# Patient Record
Sex: FEMALE | Race: WHITE | Employment: OTHER | URBAN - METROPOLITAN AREA
[De-identification: names, ages, dates, MRNs, and addresses within clinical notes are randomized per-mention and may not be internally consistent; named-entity substitution may affect disease eponyms.]

---

## 2017-03-27 DIAGNOSIS — I10 ESSENTIAL HYPERTENSION, BENIGN: ICD-10-CM

## 2017-03-27 DIAGNOSIS — E78.5 HYPERLIPIDEMIA LDL GOAL <130: ICD-10-CM

## 2017-03-27 NOTE — TELEPHONE ENCOUNTER
Lisinopril      Last Written Prescription Date: 10/04/16  Last Fill Quantity: 90, # refills: 1  Last Office Visit with [facility] or  Health prescribing provider: 10/24/16  Next 5 appointments (look out 90 days)     Apr 26, 2017 10:20 AM CDT   SHORT with Adri Malcolm MD   [facility]    [address]  [facility] [address]   [phone]                   Potassium   Date Value Ref Range Status   10/24/2016 4.4 3.4 - 5.3 mmol/L Final     Creatinine   Date Value Ref Range Status   10/24/2016 0.99 0.52 - 1.04 mg/dL Final     BP Readings from Last 3 Encounters:   10/24/16 130/78   03/30/16 134/80   10/07/15 138/84       Simvastatin     Last Written Prescription Date: 10/04/16  Last Fill Quantity: 90, # refills: 1  Last Office Visit with [facility], [facility] or  Health prescribing provider: 10/24/16  Next 5 appointments (look out 90 days)     Apr 26, 2017 10:20 AM CDT   SHORT with Adri Malcolm MD   [facility]    [address]  [facility] [address]   [phone]                   Lab Results   Component Value Date    CHOL 184 03/30/2016     Lab Results   Component Value Date    HDL 58 03/30/2016     Lab Results   Component Value Date    LDL 79 03/30/2016     Lab Results   Component Value Date    TRIG 233 03/30/2016     Lab Results   Component Value Date    CHOLHDLRATIO 6.1 10/07/2015       Labs showing if normal/abnormal  Lab Results   Component Value Date    CHOL 184 03/30/2016    TRIG 233 (H) 03/30/2016    HDL 58 03/30/2016    LDL 79 03/30/2016    VLDL 47 (H) 10/07/2015    CHOLHDLRATIO 6.1 (H) 10/07/2015

## 2017-03-28 RX ORDER — SIMVASTATIN 20 MG
20 TABLET ORAL AT BEDTIME
Qty: 90 TABLET | Refills: 0 | Status: SHIPPED | OUTPATIENT
Start: 2017-03-28 | End: 2017-04-26

## 2017-03-28 RX ORDER — LISINOPRIL 10 MG/1
10 TABLET ORAL DAILY
Qty: 90 TABLET | Refills: 0 | Status: SHIPPED | OUTPATIENT
Start: 2017-03-28 | End: 2017-04-26

## 2017-04-26 ENCOUNTER — OFFICE VISIT (OUTPATIENT)
Dept: INTERNAL MEDICINE | Facility: CLINIC | Age: 77
End: 2017-04-26
Payer: MEDICARE

## 2017-04-26 VITALS
HEIGHT: 66 IN | SYSTOLIC BLOOD PRESSURE: 128 MMHG | WEIGHT: 182.1 LBS | TEMPERATURE: 97.7 F | DIASTOLIC BLOOD PRESSURE: 80 MMHG | OXYGEN SATURATION: 96 % | HEART RATE: 78 BPM | BODY MASS INDEX: 29.27 KG/M2

## 2017-04-26 DIAGNOSIS — R60.9 EDEMA, UNSPECIFIED TYPE: ICD-10-CM

## 2017-04-26 DIAGNOSIS — I10 ESSENTIAL HYPERTENSION, BENIGN: Primary | ICD-10-CM

## 2017-04-26 DIAGNOSIS — E78.5 HYPERLIPIDEMIA LDL GOAL <130: ICD-10-CM

## 2017-04-26 PROCEDURE — 36415 COLL VENOUS BLD VENIPUNCTURE: CPT | Performed by: INTERNAL MEDICINE

## 2017-04-26 PROCEDURE — 83735 ASSAY OF MAGNESIUM: CPT | Performed by: INTERNAL MEDICINE

## 2017-04-26 PROCEDURE — 80061 LIPID PANEL: CPT | Performed by: INTERNAL MEDICINE

## 2017-04-26 PROCEDURE — 80053 COMPREHEN METABOLIC PANEL: CPT | Performed by: INTERNAL MEDICINE

## 2017-04-26 PROCEDURE — 99214 OFFICE O/P EST MOD 30 MIN: CPT | Performed by: INTERNAL MEDICINE

## 2017-04-26 RX ORDER — FUROSEMIDE 20 MG
20 TABLET ORAL 2 TIMES DAILY
Qty: 180 TABLET | Refills: 1 | Status: SHIPPED | OUTPATIENT
Start: 2017-04-26 | End: 2017-10-25

## 2017-04-26 RX ORDER — SIMVASTATIN 20 MG
20 TABLET ORAL AT BEDTIME
Qty: 90 TABLET | Refills: 1 | Status: SHIPPED | OUTPATIENT
Start: 2017-04-26 | End: 2017-10-25

## 2017-04-26 RX ORDER — LISINOPRIL 10 MG/1
10 TABLET ORAL DAILY
Qty: 90 TABLET | Refills: 1 | Status: SHIPPED | OUTPATIENT
Start: 2017-04-26 | End: 2017-10-25

## 2017-04-26 NOTE — PROGRESS NOTES
SUBJECTIVE:                                                    Paris Hull is a 77 year old female who presents to clinic today for the following health issues:      Hyperlipidemia Follow-Up      Rate your low fat/cholesterol diet?: good    Taking statin?  Yes, no muscle aches from statin    Other lipid medications/supplements?:  none     Hypertension Follow-up      Outpatient blood pressures are not being checked.    Low Salt Diet: low salt       Edema Follow up; on lasix 20 mg -2 tabs daily , with good symptom relief. C/o occ leg cramps.          Patient Active Problem List   Diagnosis     Disorder of bone and cartilage     Osteoporosis     HYPERLIPIDEMIA LDL GOAL <130     Central retinal vein occlusion     Essential hypertension, benign     Edema, unspecified type     Past Surgical History:   Procedure Laterality Date     COLONOSCOPY       COLONOSCOPY N/A 2015    Procedure: COLONOSCOPY;  Surgeon: Santosh Liao MD;  Location: [facility]     HC REMOVAL OF TONSILS,<13 Y/O         Social History   Substance Use Topics     Smoking status: Former Smoker     Packs/day: 0.50     Types: Cigarettes     Smokeless tobacco: Former User     Quit date: 2012      Comment: Has smoked for over 30 yrs.     Alcohol use No     Family History   Problem Relation Age of Onset     Cardiovascular Mother      a-fib/living age 87     GASTROINTESTINAL DISEASE Mother      diverticulosis     HEART DISEASE Father       age 80-CVA     Cardiovascular Father      Neurologic Disorder Father      parkinsons     Family History Negative Sister      age 59     Family History Negative Brother      age 67     Colon Cancer No family hx of          Current Outpatient Prescriptions   Medication Sig Dispense Refill     furosemide (LASIX) 20 MG tablet Take 1 tablet (20 mg) by mouth 2 times daily 180 tablet 1     simvastatin (ZOCOR) 20 MG tablet Take 1 tablet (20 mg) by mouth At Bedtime 90 tablet 0     lisinopril (PRINIVIL/ZESTRIL) 10  "MG tablet Take 1 tablet (10 mg) by mouth daily 90 tablet 0     aspirin 81 MG tablet Take 81 mg by mouth daily       ibuprofen (ADVIL,MOTRIN) 200 MG tablet Take 2 tablets by mouth every 8 hours as needed for pain. 1 tablet 0     fish oil-omega-3 fatty acids (FISH OIL) 1000 MG capsule Take 2 g by mouth daily.       Cholecalciferol (VITAMIN D) 1000 UNIT capsule Take 1 capsule by mouth daily.       ALEVE OR aleve arthritis prn       CALCIUM 600 + D 600-200 MG-UNIT OR TABS 1 tid  0     METAMUCIL 0.52 GM OR CAPS 2-3  tid  0     MULTIPLE VITAMINS OR TABS        [DISCONTINUED] furosemide (LASIX) 20 MG tablet Take 1 tablet (20 mg) by mouth 2 times daily 180 tablet 1       Reviewed and updated as needed this visit by clinical staff  Tobacco  Allergies  Meds  Med Hx  Surg Hx  Fam Hx  Soc Hx      Reviewed and updated as needed this visit by Provider         ROS:  C: NEGATIVE for fever, chills, change in weight  E/M: NEGATIVE for ear, mouth and throat problems  R: NEGATIVE for significant cough or SOB  CV: NEGATIVE for chest pain, palpitations or peripheral edema  MUSCULOSKELETAL: NEGATIVE for significant arthralgias or myalgia  NEURO: NEGATIVE for weakness, dizziness or paresthesias    OBJECTIVE:                                                    /80 (BP Location: Left arm, Patient Position: Chair, Cuff Size: Adult Regular)  Pulse 78  Temp 97.7  F (36.5  C) (Oral)  Ht 5' 6\" (1.676 m)  Wt 182 lb 1.6 oz (82.6 kg)  SpO2 96%  Breastfeeding? No  BMI 29.39 kg/m2  Body mass index is 29.39 kg/(m^2).   GENERAL: healthy, alert, well nourished, well hydrated, no distress  EYES: Eyes grossly normal to inspection, extraocular movements - intact, and PERRL  HENT: ear canals- normal; TMs- normal; Nose- normal; Mouth- no ulcers, no lesions  NECK: no tenderness, no adenopathy, no asymmetry, no masses, no stiffness; thyroid- normal to palpation  RESP: lungs clear to auscultation - no rales, no rhonchi, no wheezes  CV: regular " rates and rhythm,   MS: extremities- no gross deformities noted, no edema, no calf tenderness  NEURO: strength and tone- normal, sensory exam- grossly normal, mentation- intact, speech- normal, reflexes- symmetric         ASSESSMENT/PLAN:                                                        (I10) Essential hypertension, benign  (primary encounter diagnosis)  Comment: BP well controlled   Plan: Comprehensive metabolic panel, refilled lisinopril (PRINIVIL/ZESTRIL) 10 MG tablet daily as directed.explained clearly about the medication,insructions and side effects.   Advised to follow low salt diet and exercise and f/u in 6 mths.             (R60.9) Edema, unspecified type  Comment: stable   Plan: refilled furosemide (LASIX) 20 MG tablet.explained clearly about the medication,insructions and side effects. Has muscle cramps-Check potassium,calcium and check  Magnesium also             (E78.5) Hyperlipidemia LDL goal <130  Plan: Lipid panel reflex to direct LDL, refilled simvastatin (ZOCOR) 20 MG tablet as directed.explained clearly about the medication,insructions and side effects.            Adri Malcolm MD  [facility]

## 2017-04-26 NOTE — MR AVS SNAPSHOT
"              After Visit Summary   4/26/2017    Paris Hull    MRN: [id number]           Patient Information     Date Of Birth          1940        Visit Information        Provider Department      4/26/2017 10:20 AM Adri Malcolm MD [facility]        Today's Diagnoses     Essential hypertension, benign    -  1    Edema, unspecified type        Hyperlipidemia LDL goal <130           Follow-ups after your visit        Your next 10 appointments already scheduled     Oct 25, 2017 10:20 AM CDT   SHORT with Adri Malcolm MD   [facility] ([facility])    [address]  [facility] [id number] [phone]              Who to contact     If you have questions or need follow up information about today's clinic visit or your schedule please contact [facility] directly at [phone].  Normal or non-critical lab and imaging results will be communicated to you by MyChart, letter or phone within 4 business days after the clinic has received the results. If you do not hear from us within 7 days, please contact the clinic through MyChart or phone. If you have a critical or abnormal lab result, we will notify you by phone as soon as possible.  Submit refill requests through [employer] or call your pharmacy and they will forward the refill request to us. Please allow 3 business days for your refill to be completed.          Additional Information About Your Visit        MyChart Information     [employer] lets you send messages to your doctor, view your test results, renew your prescriptions, schedule appointments and more. To sign up, go to [email or web address] . Click on \"Log in\" on the left side of the screen, which will take you to the Welcome page. Then click on \"Sign up Now\" on the right side of the page.     You will be asked to enter the access code listed below, as well as some personal information. Please follow " "the directions to create your username and password.     Your access code is: [email or web address]  Expires: 2017 11:07 AM     Your access code will  in 90 days. If you need help or a new code, please call your [facility] clinic or [phone].        Care EveryWhere ID     This is your Care EveryWhere ID. This could be used by other organizations to access your [facility] medical records  [id number]        Your Vitals Were     Pulse Temperature Height Pulse Oximetry Breastfeeding? BMI (Body Mass Index)    78 97.7  F (36.5  C) (Oral) 5' 6\" (1.676 m) 96% No 29.39 kg/m2       Blood Pressure from Last 3 Encounters:   17 128/80   10/24/16 130/78   16 134/80    Weight from Last 3 Encounters:   17 182 lb 1.6 oz (82.6 kg)   10/24/16 178 lb (80.7 kg)   16 188 lb (85.3 kg)              We Performed the Following     Comprehensive metabolic panel     Lipid panel reflex to direct LDL     Magnesium          Where to get your medicines      These medications were sent to [facility] [place], [place] - [address]  [address] [facility] [address]     Phone:  [phone]     furosemide 20 MG tablet    lisinopril 10 MG tablet    simvastatin 20 MG tablet          Primary Care Provider Office Phone # Fax #    Krishnakumari MÓNICA Malcolm [phone]       [facility] [address] [facility] [address]        Thank you!     Thank you for choosing [facility]  for your care. Our goal is always to provide you with excellent care. Hearing back from our patients is one way we can continue to improve our services. Please take a few minutes to complete the written survey that you may receive in the mail after your visit with us. Thank you!             Your Updated Medication List - Protect others around you: Learn how to safely use, store and throw away your medicines at www.disposemymeds.org.          This list is accurate " as of: 4/26/17 11:07 AM.  Always use your most recent med list.                   Brand Name Dispense Instructions for use    ALEVE PO      aleve arthritis prn       aspirin 81 MG tablet      Take 81 mg by mouth daily       CALCIUM 600 + D 600-200 MG-UNIT Tabs      1 tid       fish oil-omega-3 fatty acids 1000 MG capsule      Take 2 g by mouth daily.       furosemide 20 MG tablet    LASIX    180 tablet    Take 1 tablet (20 mg) by mouth 2 times daily       ibuprofen 200 MG tablet    ADVIL/MOTRIN    1 tablet    Take 2 tablets by mouth every 8 hours as needed for pain.       lisinopril 10 MG tablet    PRINIVIL/ZESTRIL    90 tablet    Take 1 tablet (10 mg) by mouth daily       METAMUCIL 0.52 G capsule   Generic drug:  psyllium      2-3  tid       Multiple vitamin  s Tabs          simvastatin 20 MG tablet    ZOCOR    90 tablet    Take 1 tablet (20 mg) by mouth At Bedtime       vitamin D 1000 UNITS capsule      Take 1 capsule by mouth daily.

## 2017-04-26 NOTE — NURSING NOTE
"Chief Complaint   Patient presents with     RECHECK     Pt is F/U on Bp       Initial /80 (BP Location: Left arm, Patient Position: Chair, Cuff Size: Adult Regular)  Pulse 78  Temp 97.7  F (36.5  C) (Oral)  Ht 5' 6\" (1.676 m)  Wt 182 lb 1.6 oz (82.6 kg)  SpO2 96%  Breastfeeding? No  BMI 29.39 kg/m2 Estimated body mass index is 29.39 kg/(m^2) as calculated from the following:    Height as of this encounter: 5' 6\" (1.676 m).    Weight as of this encounter: 182 lb 1.6 oz (82.6 kg).  Medication Reconciliation: complete   Shannan Whitlock MA    "

## 2017-04-27 LAB
ALBUMIN SERPL-MCNC: 3.9 G/DL (ref 3.4–5)
ALP SERPL-CCNC: 64 U/L (ref 40–150)
ALT SERPL W P-5'-P-CCNC: 22 U/L (ref 0–50)
ANION GAP SERPL CALCULATED.3IONS-SCNC: 10 MMOL/L (ref 3–14)
AST SERPL W P-5'-P-CCNC: 13 U/L (ref 0–45)
BILIRUB SERPL-MCNC: 0.8 MG/DL (ref 0.2–1.3)
BUN SERPL-MCNC: 16 MG/DL (ref 7–30)
CALCIUM SERPL-MCNC: 9.5 MG/DL (ref 8.5–10.1)
CHLORIDE SERPL-SCNC: 106 MMOL/L (ref 94–109)
CHOLEST SERPL-MCNC: 193 MG/DL
CO2 SERPL-SCNC: 26 MMOL/L (ref 20–32)
CREAT SERPL-MCNC: 0.88 MG/DL (ref 0.52–1.04)
GFR SERPL CREATININE-BSD FRML MDRD: 63 ML/MIN/1.7M2
GLUCOSE SERPL-MCNC: 93 MG/DL (ref 70–99)
HDLC SERPL-MCNC: 55 MG/DL
LDLC SERPL CALC-MCNC: 100 MG/DL
MAGNESIUM SERPL-MCNC: 2.5 MG/DL (ref 1.6–2.3)
NONHDLC SERPL-MCNC: 138 MG/DL
POTASSIUM SERPL-SCNC: 4.1 MMOL/L (ref 3.4–5.3)
PROT SERPL-MCNC: 7.4 G/DL (ref 6.8–8.8)
SODIUM SERPL-SCNC: 142 MMOL/L (ref 133–144)
TRIGL SERPL-MCNC: 189 MG/DL

## 2017-05-24 DIAGNOSIS — E83.41 HYPERMAGNESEMIA: ICD-10-CM

## 2017-05-24 PROCEDURE — 83735 ASSAY OF MAGNESIUM: CPT | Performed by: INTERNAL MEDICINE

## 2017-05-24 PROCEDURE — 36415 COLL VENOUS BLD VENIPUNCTURE: CPT | Performed by: INTERNAL MEDICINE

## 2017-05-25 LAB — MAGNESIUM SERPL-MCNC: 2.5 MG/DL (ref 1.6–2.3)

## 2017-06-07 DIAGNOSIS — R79.0 ABNORMAL BLOOD LEVEL OF MAGNESIUM: Primary | ICD-10-CM

## 2017-06-26 ENCOUNTER — HOSPITAL ENCOUNTER (OUTPATIENT)
Dept: MAMMOGRAPHY | Facility: CLINIC | Age: 77
Discharge: HOME | End: 2017-06-26
Attending: INTERNAL MEDICINE | Admitting: INTERNAL MEDICINE
Payer: MEDICARE

## 2017-06-26 DIAGNOSIS — Z12.31 ENCOUNTER FOR SCREENING MAMMOGRAM FOR HIGH-RISK PATIENT: ICD-10-CM

## 2017-06-26 PROCEDURE — G0202 SCR MAMMO BI INCL CAD: HCPCS

## 2017-07-19 DIAGNOSIS — R79.0 ABNORMAL BLOOD LEVEL OF MAGNESIUM: ICD-10-CM

## 2017-07-19 LAB — MAGNESIUM SERPL-MCNC: 2.7 MG/DL (ref 1.6–2.3)

## 2017-07-19 PROCEDURE — 36415 COLL VENOUS BLD VENIPUNCTURE: CPT | Performed by: INTERNAL MEDICINE

## 2017-07-19 PROCEDURE — 83735 ASSAY OF MAGNESIUM: CPT | Performed by: INTERNAL MEDICINE

## 2017-07-28 ENCOUNTER — DOCUMENTATION ONLY (OUTPATIENT)
Dept: LAB | Facility: CLINIC | Age: 77
End: 2017-07-28

## 2017-07-28 DIAGNOSIS — E83.41 HYPERMAGNESEMIA: Primary | ICD-10-CM

## 2017-08-09 DIAGNOSIS — E83.41 HYPERMAGNESEMIA: ICD-10-CM

## 2017-08-09 LAB — MAGNESIUM SERPL-MCNC: 2.5 MG/DL (ref 1.6–2.3)

## 2017-08-09 PROCEDURE — 36415 COLL VENOUS BLD VENIPUNCTURE: CPT | Performed by: INTERNAL MEDICINE

## 2017-08-09 PROCEDURE — 83735 ASSAY OF MAGNESIUM: CPT | Performed by: INTERNAL MEDICINE

## 2017-10-25 ENCOUNTER — OFFICE VISIT (OUTPATIENT)
Dept: INTERNAL MEDICINE | Facility: CLINIC | Age: 77
End: 2017-10-25
Payer: MEDICARE

## 2017-10-25 VITALS
HEART RATE: 82 BPM | DIASTOLIC BLOOD PRESSURE: 78 MMHG | SYSTOLIC BLOOD PRESSURE: 128 MMHG | HEIGHT: 66 IN | WEIGHT: 186 LBS | OXYGEN SATURATION: 96 % | TEMPERATURE: 97.3 F | BODY MASS INDEX: 29.89 KG/M2

## 2017-10-25 DIAGNOSIS — I10 ESSENTIAL HYPERTENSION, BENIGN: Primary | ICD-10-CM

## 2017-10-25 DIAGNOSIS — R60.9 EDEMA, UNSPECIFIED TYPE: ICD-10-CM

## 2017-10-25 DIAGNOSIS — Z23 NEED FOR PROPHYLACTIC VACCINATION AND INOCULATION AGAINST INFLUENZA: ICD-10-CM

## 2017-10-25 DIAGNOSIS — E78.5 HYPERLIPIDEMIA LDL GOAL <130: ICD-10-CM

## 2017-10-25 DIAGNOSIS — Z23 NEED FOR INFLUENZA VACCINATION: ICD-10-CM

## 2017-10-25 PROCEDURE — 99214 OFFICE O/P EST MOD 30 MIN: CPT | Performed by: INTERNAL MEDICINE

## 2017-10-25 PROCEDURE — 36415 COLL VENOUS BLD VENIPUNCTURE: CPT | Performed by: INTERNAL MEDICINE

## 2017-10-25 PROCEDURE — 80061 LIPID PANEL: CPT | Performed by: INTERNAL MEDICINE

## 2017-10-25 PROCEDURE — 83735 ASSAY OF MAGNESIUM: CPT | Performed by: INTERNAL MEDICINE

## 2017-10-25 PROCEDURE — 90662 IIV NO PRSV INCREASED AG IM: CPT | Performed by: INTERNAL MEDICINE

## 2017-10-25 PROCEDURE — 80053 COMPREHEN METABOLIC PANEL: CPT | Performed by: INTERNAL MEDICINE

## 2017-10-25 PROCEDURE — G0008 ADMIN INFLUENZA VIRUS VAC: HCPCS | Performed by: INTERNAL MEDICINE

## 2017-10-25 RX ORDER — SENNOSIDES 8.6 MG
2 TABLET ORAL DAILY
COMMUNITY

## 2017-10-25 RX ORDER — LISINOPRIL 10 MG/1
10 TABLET ORAL DAILY
Qty: 90 TABLET | Refills: 1 | Status: SHIPPED | OUTPATIENT
Start: 2017-10-25 | End: 2018-04-25

## 2017-10-25 RX ORDER — FUROSEMIDE 20 MG
20 TABLET ORAL 2 TIMES DAILY
Qty: 180 TABLET | Refills: 1 | Status: SHIPPED | OUTPATIENT
Start: 2017-10-25 | End: 2018-04-25

## 2017-10-25 RX ORDER — SIMVASTATIN 20 MG
20 TABLET ORAL AT BEDTIME
Qty: 90 TABLET | Refills: 1 | Status: SHIPPED | OUTPATIENT
Start: 2017-10-25 | End: 2018-04-25 | Stop reason: ALTCHOICE

## 2017-10-25 NOTE — LETTER
[facility]  [address], [address]  [place] [address]  [phone]        November 16, 2017    Paris Hull  [address] [facility] [address]            Dear MsAme Hull:      Creatinine slightly above normal and also Triglycerides above normal . Please advise to continue same meds and follow low fat diet, exercise.   Magnesium same as before. Continue to hold any OTC magnesium supplements.     Sincerely,        Burt Malcolm M.D.

## 2017-10-25 NOTE — MR AVS SNAPSHOT
"              After Visit Summary   10/25/2017    Paris Hull    MRN: [id number]           Patient Information     Date Of Birth          1940        Visit Information        Provider Department      10/25/2017 10:20 AM Adri Malcolm MD [facility]        Today's Diagnoses     Essential hypertension, benign    -  1    Hyperlipidemia LDL goal <130        Edema, unspecified type        Need for influenza vaccination           Follow-ups after your visit        Your next 10 appointments already scheduled     Apr 25, 2018 10:20 AM CDT   SHORT with Adri Malcolm MD   [facility] ([facility])    [address]  [facility] [address] [phone]              Who to contact     If you have questions or need follow up information about today's clinic visit or your schedule please contact [facility] directly at [phone].  Normal or non-critical lab and imaging results will be communicated to you by MyChart, letter or phone within 4 business days after the clinic has received the results. If you do not hear from us within 7 days, please contact the clinic through MyChart or phone. If you have a critical or abnormal lab result, we will notify you by phone as soon as possible.  Submit refill requests through [employer] or call your pharmacy and they will forward the refill request to us. Please allow 3 business days for your refill to be completed.          Additional Information About Your Visit        MyChart Information     [employer] lets you send messages to your doctor, view your test results, renew your prescriptions, schedule appointments and more. To sign up, go to [email or web address] . Click on \"Log in\" on the left side of the screen, which will take you to the Welcome page. Then click on \"Sign up Now\" on the right side of the page.     You will be asked to enter the access code listed below, as well as " "some personal information. Please follow the directions to create your username and password.     Your access code is: [email or web address]  Expires: 2018 10:51 AM     Your access code will  in 90 days. If you need help or a new code, please call your [facility] clinic or [phone].        Care EveryWhere ID     This is your Care EveryWhere ID. This could be used by other organizations to access your [facility] medical records  [id number]        Your Vitals Were     Pulse Temperature Height Pulse Oximetry BMI (Body Mass Index)       82 97.3  F (36.3  C) (Oral) 5' 6\" (1.676 m) 96% 30.02 kg/m2        Blood Pressure from Last 3 Encounters:   10/25/17 128/78   17 128/80   10/24/16 130/78    Weight from Last 3 Encounters:   10/25/17 186 lb (84.4 kg)   17 182 lb 1.6 oz (82.6 kg)   10/24/16 178 lb (80.7 kg)              We Performed the Following     Comprehensive metabolic panel     Lipid panel reflex to direct LDL Fasting     Magnesium          Today's Medication Changes          These changes are accurate as of: 10/25/17 10:51 AM.  If you have any questions, ask your nurse or doctor.               Stop taking these medicines if you haven't already. Please contact your care team if you have questions.     METAMUCIL 0.52 G capsule   Generic drug:  psyllium   Stopped by:  Adri Malcolm MD           Multiple vitamin  s Tabs   Stopped by:  Adri Malcolm MD           vitamin D 1000 UNITS capsule   Stopped by:  Adri Malcolm MD                Where to get your medicines      These medications were sent to [facility] - [place], [place] - [address]  [address], [facility] [address]     Phone:  [phone]     furosemide 20 MG tablet    lisinopril 10 MG tablet    simvastatin 20 MG tablet                Primary Care Provider Office Phone # Fax #    Adri Malcolm [phone]       [address] " [facility] [address]        Equal Access to Services     [facility] LAVELL : Hadii nelda alvarado erasto Soemely, waaxda luqadaha, qaybta kaseverinochris rosas, marsha njgideoncornelius lópez. So [facility] [phone].    ATENCIÓN: Si habla español, tiene a gunn disposición servicios gratuitos de asistencia lingüística. Daveame al [phone].    We comply with applicable federal civil rights laws and [place] laws. We do not discriminate on the basis of race, color, national origin, age, disability, sex, sexual orientation, or gender identity.            Thank you!     Thank you for choosing [facility]  for your care. Our goal is always to provide you with excellent care. Hearing back from our patients is one way we can continue to improve our services. Please take a few minutes to complete the written survey that you may receive in the mail after your visit with us. Thank you!             Your Updated Medication List - Protect others around you: Learn how to safely use, store and throw away your medicines at www.disposemymeds.org.          This list is accurate as of: 10/25/17 10:51 AM.  Always use your most recent med list.                   Brand Name Dispense Instructions for use Diagnosis    ALEVE PO      aleve arthritis prn        aspirin 81 MG tablet      Take 81 mg by mouth daily        CALCIUM 600 + D 600-200 MG-UNIT Tabs      1 tid    Routine gynecological examination       fish oil-omega-3 fatty acids 1000 MG capsule      Take 2 g by mouth daily.        furosemide 20 MG tablet    LASIX    180 tablet    Take 1 tablet (20 mg) by mouth 2 times daily    Edema, unspecified type       ibuprofen 200 MG tablet    ADVIL/MOTRIN    1 tablet    Take 2 tablets by mouth every 8 hours as needed for pain.        lisinopril 10 MG tablet    PRINIVIL/ZESTRIL    90 tablet    Take 1 tablet (10 mg) by mouth daily    Essential hypertension, benign       sennosides 8.6 MG tablet    SENOKOT     Take 2 tablets by  mouth daily        simvastatin 20 MG tablet    ZOCOR    90 tablet    Take 1 tablet (20 mg) by mouth At Bedtime    Hyperlipidemia LDL goal <130

## 2017-10-25 NOTE — PROGRESS NOTES
SUBJECTIVE:   Paris Hull is a 77 year old female who presents to clinic today for the following health issues:      Hyperlipidemia Follow-Up      Rate your low fat/cholesterol diet?: fair    Taking statin?  Yes, possible muscle aches from statin    Other lipid medications/supplements?:  none    Hypertension Follow-up      Outpatient blood pressures are not being checked. On lisinopril 10 mg daily     Low Salt Diet: no added salt      Edema Follow up; on lasix 20 mg -2 tabs daily with good symptom control.    Also here to check magnesium, not on any mag supplements .taking 2 kinds of stool softners/laxatives       Amount of exercise or physical activity: 6-7 days/week for an average of 15-30 minutes    Problems taking medications regularly: No    Medication side effects: none  Diet: low salt       Patient Active Problem List   Diagnosis     Disorder of bone and cartilage     Osteoporosis     HYPERLIPIDEMIA LDL GOAL <130     Central retinal vein occlusion     Essential hypertension, benign     Edema, unspecified type     Past Surgical History:   Procedure Laterality Date     COLONOSCOPY       COLONOSCOPY N/A 2015    Procedure: COLONOSCOPY;  Surgeon: Santosh Liao MD;  Location: [facility]     HC REMOVAL OF TONSILS,<13 Y/O         Social History   Substance Use Topics     Smoking status: Former Smoker     Packs/day: 0.50     Types: Cigarettes     Smokeless tobacco: Former User     Quit date: 2012      Comment: Has smoked for over 30 yrs.     Alcohol use No     Family History   Problem Relation Age of Onset     Cardiovascular Mother      a-fib/living age 87     GASTROINTESTINAL DISEASE Mother      diverticulosis     HEART DISEASE Father       age 80-CVA     Cardiovascular Father      Neurologic Disorder Father      parkinsons     Family History Negative Sister      age 59     Family History Negative Brother      age 67     Colon Cancer No family hx of          Current Outpatient Prescriptions  "  Medication Sig Dispense Refill     sennosides (SENOKOT) 8.6 MG tablet Take 2 tablets by mouth daily       furosemide (LASIX) 20 MG tablet Take 1 tablet (20 mg) by mouth 2 times daily 180 tablet 1     simvastatin (ZOCOR) 20 MG tablet Take 1 tablet (20 mg) by mouth At Bedtime 90 tablet 1     lisinopril (PRINIVIL/ZESTRIL) 10 MG tablet Take 1 tablet (10 mg) by mouth daily 90 tablet 1     aspirin 81 MG tablet Take 81 mg by mouth daily       fish oil-omega-3 fatty acids (FISH OIL) 1000 MG capsule Take 2 g by mouth daily.       CALCIUM 600 + D 600-200 MG-UNIT OR TABS 1 tid  0     [DISCONTINUED] furosemide (LASIX) 20 MG tablet Take 1 tablet (20 mg) by mouth 2 times daily 180 tablet 1     [DISCONTINUED] simvastatin (ZOCOR) 20 MG tablet Take 1 tablet (20 mg) by mouth At Bedtime 90 tablet 1     [DISCONTINUED] lisinopril (PRINIVIL/ZESTRIL) 10 MG tablet Take 1 tablet (10 mg) by mouth daily 90 tablet 1     ibuprofen (ADVIL,MOTRIN) 200 MG tablet Take 2 tablets by mouth every 8 hours as needed for pain. (Patient not taking: Reported on 10/25/2017) 1 tablet 0     ALEVE OR aleve arthritis prn           Reviewed and updated as needed this visit by clinical staffTobacco  Allergies  Meds  Med Hx  Surg Hx  Fam Hx  Soc Hx      Reviewed and updated as needed this visit by Provider         ROS:  C: NEGATIVE for fever, chills, change in weight  E/M: NEGATIVE for ear, mouth and throat problems  R: NEGATIVE for significant cough or SOB  CV: NEGATIVE for chest pain, palpitations or peripheral edema  NEURO: NEGATIVE for weakness, dizziness or paresthesias    OBJECTIVE:                                                    /78  Pulse 82  Temp 97.3  F (36.3  C) (Oral)  Ht 5' 6\" (1.676 m)  Wt 186 lb (84.4 kg)  SpO2 96%  BMI 30.02 kg/m2  Body mass index is 30.02 kg/(m^2).   GENERAL: healthy, alert, well nourished, well hydrated, no distress  EYES: Eyes grossly normal to inspection, extraocular movements - intact, and PERRL  HENT:  " Mouth- no ulcers, no lesions  NECK: no tenderness, no adenopathy, no asymmetry, no masses, no stiffness;   RESP: lungs clear to auscultation - no rales, no rhonchi, no wheezes  CV: regular rates and rhythm, normal S1 S2,    MS: extremities- no gross deformities noted, no edema, no calf tenderness  NEURO: strength and tone- normal, sensory exam- grossly normal, mentation- intact, speech- normal, reflexes- symmetric         ASSESSMENT/PLAN:                                                         (I10) Essential hypertension, benign  (primary encounter diagnosis)  Comment: BP well controlled   Plan: Comprehensive metabolic panel, Magnesium, refilled lisinopril (PRINIVIL/ZESTRIL) 10 MG tablet as directed. explained clearly about the medication,insructions and side effects. Advised to follow low salt diet and exercise and f/u in 6 mths.             (E78.5) Hyperlipidemia LDL goal <130  Plan: Lipid panel reflex to direct LDL Fasting, refilled  simvastatin (ZOCOR) 20 MG tablet .explained clearly about the medication,insructions and side effects.             (R60.9) Edema, unspecified type  Comment: stable   Plan: furosemide (LASIX) 20 MG tablet refilled as directed.explained clearly about the medication,insructions and side effects.        (Z23) Need for influenza vaccination  Plan: flu vaccine given today         Adri Malcolm MD  [facility]

## 2017-10-25 NOTE — NURSING NOTE
"Chief Complaint   Patient presents with     Hypertension     Lipids       Initial /78  Pulse 82  Temp 97.3  F (36.3  C) (Oral)  Ht 5' 6\" (1.676 m)  Wt 186 lb (84.4 kg)  SpO2 96%  BMI 30.02 kg/m2 Estimated body mass index is 30.02 kg/(m^2) as calculated from the following:    Height as of this encounter: 5' 6\" (1.676 m).    Weight as of this encounter: 186 lb (84.4 kg).  Medication Reconciliation: complete     Annelise Gibson CMA      "

## 2017-10-25 NOTE — PROGRESS NOTES

## 2017-10-26 LAB
ALBUMIN SERPL-MCNC: 4.3 G/DL (ref 3.4–5)
ALP SERPL-CCNC: 67 U/L (ref 40–150)
ALT SERPL W P-5'-P-CCNC: 25 U/L (ref 0–50)
ANION GAP SERPL CALCULATED.3IONS-SCNC: 9 MMOL/L (ref 3–14)
AST SERPL W P-5'-P-CCNC: 18 U/L (ref 0–45)
BILIRUB SERPL-MCNC: 0.9 MG/DL (ref 0.2–1.3)
BUN SERPL-MCNC: 22 MG/DL (ref 7–30)
CALCIUM SERPL-MCNC: 10 MG/DL (ref 8.5–10.1)
CHLORIDE SERPL-SCNC: 104 MMOL/L (ref 94–109)
CHOLEST SERPL-MCNC: 204 MG/DL
CO2 SERPL-SCNC: 27 MMOL/L (ref 20–32)
CREAT SERPL-MCNC: 1.05 MG/DL (ref 0.52–1.04)
GFR SERPL CREATININE-BSD FRML MDRD: 51 ML/MIN/1.7M2
GLUCOSE SERPL-MCNC: 105 MG/DL (ref 70–99)
HDLC SERPL-MCNC: 59 MG/DL
LDLC SERPL CALC-MCNC: 103 MG/DL
MAGNESIUM SERPL-MCNC: 2.5 MG/DL (ref 1.6–2.3)
NONHDLC SERPL-MCNC: 145 MG/DL
POTASSIUM SERPL-SCNC: 4.7 MMOL/L (ref 3.4–5.3)
PROT SERPL-MCNC: 7.6 G/DL (ref 6.8–8.8)
SODIUM SERPL-SCNC: 140 MMOL/L (ref 133–144)
TRIGL SERPL-MCNC: 211 MG/DL

## 2017-11-14 ENCOUNTER — TELEPHONE (OUTPATIENT)
Dept: INTERNAL MEDICINE | Facility: CLINIC | Age: 77
End: 2017-11-14

## 2018-03-18 DIAGNOSIS — I10 ESSENTIAL HYPERTENSION, BENIGN: ICD-10-CM

## 2018-03-20 RX ORDER — LISINOPRIL 10 MG/1
TABLET ORAL
Qty: 30 TABLET | Refills: 0 | Status: SHIPPED | OUTPATIENT
Start: 2018-03-20 | End: 2018-04-25

## 2018-03-20 NOTE — TELEPHONE ENCOUNTER
"Requested Prescriptions   Pending Prescriptions Disp Refills     lisinopril (PRINIVIL/ZESTRIL) 10 MG tablet [Pharmacy Med Name: LISINOPRIL 10MG TABLETS] 90 tablet 0     Sig: TAKE 1 TABLET BY MOUTH DAILY    ACE Inhibitors (Including Combos) Protocol Failed    3/18/2018  4:43 PM       Failed - Normal serum creatinine on file in past 12 months    Recent Labs   Lab Test  10/25/17   1041   CR  1.05*            Passed - Blood pressure under 140/90 in past 12 months    BP Readings from Last 3 Encounters:   10/25/17 128/78   04/26/17 128/80   10/24/16 130/78                Passed - Recent (12 mo) or future (30 days) visit within the authorizing provider's specialty    Patient had office visit in the last 12 months or has a visit in the next 30 days with authorizing provider or within the authorizing provider's specialty.  See \"Patient Info\" tab in inbasket, or \"Choose Columns\" in Meds & Orders section of the refill encounter.           Passed - Patient is age 18 or older       Passed - No active pregnancy on record       Passed - Normal serum potassium on file in past 12 months    Recent Labs   Lab Test  10/25/17   1041   POTASSIUM  4.7            Passed - No positive pregnancy test in past 12 months      Routing refill request to provider for review/approval because:  Labs out of range:  Cr          "

## 2018-04-25 ENCOUNTER — OFFICE VISIT (OUTPATIENT)
Dept: INTERNAL MEDICINE | Facility: CLINIC | Age: 78
End: 2018-04-25
Payer: MEDICARE

## 2018-04-25 VITALS
BODY MASS INDEX: 30.31 KG/M2 | HEART RATE: 71 BPM | OXYGEN SATURATION: 97 % | SYSTOLIC BLOOD PRESSURE: 132 MMHG | DIASTOLIC BLOOD PRESSURE: 84 MMHG | RESPIRATION RATE: 16 BRPM | TEMPERATURE: 98.1 F | WEIGHT: 187.8 LBS

## 2018-04-25 DIAGNOSIS — Z23 NEED FOR VACCINATION: ICD-10-CM

## 2018-04-25 DIAGNOSIS — E78.5 HYPERLIPIDEMIA LDL GOAL <130: Primary | ICD-10-CM

## 2018-04-25 DIAGNOSIS — I10 ESSENTIAL HYPERTENSION, BENIGN: ICD-10-CM

## 2018-04-25 DIAGNOSIS — K21.9 GASTROESOPHAGEAL REFLUX DISEASE, ESOPHAGITIS PRESENCE NOT SPECIFIED: ICD-10-CM

## 2018-04-25 DIAGNOSIS — R60.9 EDEMA, UNSPECIFIED TYPE: ICD-10-CM

## 2018-04-25 PROCEDURE — 83735 ASSAY OF MAGNESIUM: CPT | Performed by: INTERNAL MEDICINE

## 2018-04-25 PROCEDURE — 90750 HZV VACC RECOMBINANT IM: CPT | Performed by: INTERNAL MEDICINE

## 2018-04-25 PROCEDURE — 36415 COLL VENOUS BLD VENIPUNCTURE: CPT | Performed by: INTERNAL MEDICINE

## 2018-04-25 PROCEDURE — 80053 COMPREHEN METABOLIC PANEL: CPT | Performed by: INTERNAL MEDICINE

## 2018-04-25 PROCEDURE — 80061 LIPID PANEL: CPT | Performed by: INTERNAL MEDICINE

## 2018-04-25 PROCEDURE — 99214 OFFICE O/P EST MOD 30 MIN: CPT | Performed by: INTERNAL MEDICINE

## 2018-04-25 RX ORDER — LISINOPRIL 10 MG/1
10 TABLET ORAL DAILY
Qty: 90 TABLET | Refills: 1 | Status: SHIPPED | OUTPATIENT
Start: 2018-04-25 | End: 2018-10-24

## 2018-04-25 RX ORDER — PRAVASTATIN SODIUM 10 MG
10 TABLET ORAL DAILY
Qty: 90 TABLET | Refills: 3 | Status: SHIPPED | OUTPATIENT
Start: 2018-04-25 | End: 2018-10-24

## 2018-04-25 RX ORDER — FUROSEMIDE 20 MG
20 TABLET ORAL 2 TIMES DAILY
Qty: 180 TABLET | Refills: 1 | Status: SHIPPED | OUTPATIENT
Start: 2018-04-25 | End: 2018-10-24

## 2018-04-25 NOTE — NURSING NOTE
"Chief Complaint   Patient presents with     Lipids     Hypertension       Initial /84  Pulse 71  Temp 98.1  F (36.7  C) (Oral)  Resp 16  Wt 187 lb 12.8 oz (85.2 kg)  SpO2 97%  BMI 30.31 kg/m2 Estimated body mass index is 30.31 kg/(m^2) as calculated from the following:    Height as of 10/25/17: 5' 6\" (1.676 m).    Weight as of this encounter: 187 lb 12.8 oz (85.2 kg).  Medication Reconciliation: ricarda Gibson CMA    Prior to injection verified patient identity using patient's name and date of birth.  Screening Questionnaire for Adult Immunization    Are you sick today?   No   Do you have allergies to medications, food, a vaccine component or latex?   No   Have you ever had a serious reaction after receiving a vaccination?   No   Do you have a long-term health problem with heart disease, lung disease, asthma, kidney disease, metabolic disease (e.g. diabetes), anemia, or other blood disorder?   No   Do you have cancer, leukemia, HIV/AIDS, or any other immune system problem?   No   In the past 3 months, have you taken medications that affect  your immune system, such as prednisone, other steroids, or anticancer drugs; drugs for the treatment of rheumatoid arthritis, Crohn s disease, or psoriasis; or have you had radiation treatments?   No   Have you had a seizure, or a brain or other nervous system problem?   No   During the past year, have you received a transfusion of blood or blood     products, or been given immune (gamma) globulin or antiviral drug?   No   For women: Are you pregnant or is there a chance you could become        pregnant during the next month?   No   Have you received any vaccinations in the past 4 weeks?   No     Immunization questionnaire answers were all negative.        Per orders of Dr. Malcolm, injection of Shingrix given by Annelise Gibson. Patient instructed to remain in clinic for 15 minutes afterwards, and to report any adverse reaction to me immediately.     "   Screening performed by Annelise Gibson on 4/25/2018 at 2:32 PM.

## 2018-04-25 NOTE — PROGRESS NOTES
SUBJECTIVE:   Paris Hull is a 78 year old female who presents to clinic today for the following health issues:      Hyperlipidemia Follow-Up      Rate your low fat/cholesterol diet?: good    Taking statin?  Yes, muscle aches after starting statin, patient would like to discontinue simvastatin and would like to try lower dose of pravastatin.    Other lipid medications/supplements?:  none    Hypertension Follow-up      Outpatient blood pressures are not being checked.    Low Salt Diet: no added salt    Edema Follow up;  stable on furosemide 20 mg twice a day     Patient is also here to check her magnesium, had elevated magnesium in the past     Pt also c/o reflux symptoms.since 6 mths but has been increaseing in the past few weeks., Patient has heartburn, belching, burping, acid taste in the mouth. No nausea, vomiting. Patient drinks about 5 cups of coffee per day. No smoking, no alcohol.        Amount of exercise or physical activity: 4-5 days/week for an average of 15-30 minutes    Problems taking medications regularly: No    Medication side effects: none    Diet: low salt      Patient Active Problem List   Diagnosis     Disorder of bone and cartilage     Osteoporosis     HYPERLIPIDEMIA LDL GOAL <130     Central retinal vein occlusion     Essential hypertension, benign     Edema, unspecified type     Past Surgical History:   Procedure Laterality Date     COLONOSCOPY       COLONOSCOPY N/A 6/25/2015    Procedure: COLONOSCOPY;  Surgeon: Santosh Liao MD;  Location: [facility]     HC REMOVAL OF TONSILS,<13 Y/O         Social History   Substance Use Topics     Smoking status: Former Smoker     Packs/day: 0.50     Types: Cigarettes     Smokeless tobacco: Former User     Quit date: 9/11/2012      Comment: Has smoked for over 30 yrs.     Alcohol use No     Family History   Problem Relation Age of Onset     Cardiovascular Mother      a-fib/living age 87     GASTROINTESTINAL DISEASE Mother      diverticulosis      HEART DISEASE Father       age 80-CVA     Cardiovascular Father      Neurologic Disorder Father      parkinsons     Family History Negative Sister      age 59     Family History Negative Brother      age 67     Colon Cancer No family hx of          Current Outpatient Prescriptions   Medication Sig Dispense Refill     ALEVE OR aleve arthritis prn       aspirin 81 MG tablet Take 81 mg by mouth daily       CALCIUM 600 + D 600-200 MG-UNIT OR TABS 1 tid  0     fish oil-omega-3 fatty acids (FISH OIL) 1000 MG capsule Take 2 g by mouth daily.       furosemide (LASIX) 20 MG tablet Take 1 tablet (20 mg) by mouth 2 times daily 180 tablet 1     ibuprofen (ADVIL,MOTRIN) 200 MG tablet Take 2 tablets by mouth every 8 hours as needed for pain. 1 tablet 0     lisinopril (PRINIVIL/ZESTRIL) 10 MG tablet Take 1 tablet (10 mg) by mouth daily 90 tablet 1     omeprazole (PRILOSEC) 20 MG CR capsule Take 1 capsule (20 mg) by mouth daily 30 capsule 1     pravastatin (PRAVACHOL) 10 MG tablet Take 1 tablet (10 mg) by mouth daily 90 tablet 3     sennosides (SENOKOT) 8.6 MG tablet Take 2 tablets by mouth daily       [DISCONTINUED] furosemide (LASIX) 20 MG tablet Take 1 tablet (20 mg) by mouth 2 times daily 180 tablet 1     [DISCONTINUED] lisinopril (PRINIVIL/ZESTRIL) 10 MG tablet Take 1 tablet (10 mg) by mouth daily 90 tablet 1     [DISCONTINUED] lisinopril (PRINIVIL/ZESTRIL) 10 MG tablet TAKE 1 TABLET BY MOUTH DAILY 30 tablet 0       Reviewed and updated as needed this visit by clinical staff  Tobacco  Allergies  Meds  Med Hx  Surg Hx  Fam Hx  Soc Hx      Reviewed and updated as needed this visit by Provider         ROS:  CONSTITUTIONAL: NEGATIVE for fever, chills, change in weight  EYES: NEGATIVE for vision changes or irritation  ENT/MOUTH: NEGATIVE for ear, mouth and throat problems  RESP: NEGATIVE for significant cough or SOB  CV: NEGATIVE for chest pain, palpitations or peripheral edema  GI: heartburn, belching  NEURO:  NEGATIVE for weakness, dizziness or paresthesias  ENDOCRINE: NEGATIVE for temperature intolerance, skin/hair changes  ROS otherwise negative    OBJECTIVE:                                                    /84  Pulse 71  Temp 98.1  F (36.7  C) (Oral)  Resp 16  Wt 187 lb 12.8 oz (85.2 kg)  SpO2 97%  BMI 30.31 kg/m2  Body mass index is 30.31 kg/(m^2).   GENERAL: healthy, alert, well nourished, well hydrated, no distress  EYES: Eyes grossly normal to inspection, extraocular movements - intact, and PERRL  HENT:   Mouth- no ulcers, no lesions  NECK: no tenderness, no adenopathy, no asymmetry, no masses, no stiffness   RESP: lungs clear to auscultation - no rales, no rhonchi, no wheezes  CV: regular rates and rhythm,  -  ABDOMEN: soft, no tenderness,   normal bowel sounds  MS: extremities- trace edema, no calf tenderness.  NEURO: strength and tone- normal, sensory exam- grossly normal, mentation- intact, speech- normal, reflexes- symmetric        ASSESSMENT/PLAN:                                                        1. Hyperlipidemia LDL goal <130  --Discontinue simvastatin  - Started on pravastatin (PRAVACHOL) 10 MG tablet; Take 1 tablet (10 mg) by mouth daily.explained clearly about the medication,insructions and side effects.  - Lipid panel reflex to direct LDL Fasting    2. Edema, unspecified type  --Stable  - furosemide (LASIX) 20 MG tablet; Take 1 tablet (20 mg) by mouth 2 times daily , refilled as directed.explained clearly about the medication,insructions and side effects.  - Magnesium    3. Essential hypertension, benign  --Blood pressure controlled  - lisinopril (PRINIVIL/ZESTRIL) 10 MG tablet; Take 1 tablet (10 mg) by mouth daily refilled.explained clearly about the medication,insructions and side effects.  - Comprehensive metabolic panel    4. Gastroesophageal reflux disease, esophagitis presence not specified  --Discussed the etiology of GERD with patient.  Encouraged lifestyle changes and the  need for a longterm regimen of medications to help reduce symptoms.     Discussed raising the head of the bed 4 to 6 inches; avoiding chocolate, coffee, peppermint, fruit juices, tomatoes, NSAID's,greasy and spicy foods.     -Started on omeprazole (PRILOSEC) 20 MG CR capsule; Take 1 capsule (20 mg) by mouth daily as directed.explained clearly about the medication,insructions and side effects. Call or return to clinic prn if these symtoms worsen, fail to improve as anticipated, or if new symptoms develop.    Vaccine needed for new shingles vaccine today.      Adri Malcolm MD  [facility]

## 2018-04-25 NOTE — MR AVS SNAPSHOT
"              After Visit Summary   4/25/2018    Paris Hull    MRN: [id number]           Patient Information     Date Of Birth          1940        Visit Information        Provider Department      4/25/2018 10:20 AM Adri Malcolm MD [facility]        Today's Diagnoses     Hyperlipidemia LDL goal <130    -  1    Edema, unspecified type        Essential hypertension, benign        Gastroesophageal reflux disease, esophagitis presence not specified           Follow-ups after your visit        Your next 10 appointments already scheduled     Oct 24, 2018 10:20 AM CDT   [facility] with Adri [facility]olm [facility]   [facility]    [address]  [facility] [id number] [phone]              Who to contact     If you have questions or need follow up information about today's clinic visit or your schedule please contact [facility] directly at [phone].  Normal or non-critical lab and imaging results will be communicated to you by MyChart, letter or phone within 4 business days after the clinic has received the results. If you do not hear from us within 7 days, please contact the clinic through MyChart or phone. If you have a critical or abnormal lab result, we will notify you by phone as soon as possible.  Submit refill requests through [employer] or call your pharmacy and they will forward the refill request to us. Please allow 3 business days for your refill to be completed.          Additional Information About Your Visit        [employer] Information     [employer] lets you send messages to your doctor, view your test results, renew your prescriptions, schedule appointments and more. To sign up, go to [email or web address] . Click on \"Log in\" on the left side of the screen, which will take you to the Welcome page. Then click on \"Sign up Now\" on the right side of the page.     You will be asked to enter the " access code listed below, as well as some personal information. Please follow the directions to create your username and password.     Your access code is: [email or web address]  Expires: 2018 10:52 AM     Your access code will  in 90 days. If you need help or a new code, please call your [facility] clinic or [phone].        Care EveryWhere ID     This is your Care EveryWhere ID. This could be used by other organizations to access your [facility] medical records  [id number]        Your Vitals Were     Pulse Temperature Respirations Pulse Oximetry BMI (Body Mass Index)       71 98.1  F (36.7  C) (Oral) 16 97% 30.31 kg/m2        Blood Pressure from Last 3 Encounters:   18 132/84   10/25/17 128/78   17 128/80    Weight from Last 3 Encounters:   18 187 lb 12.8 oz (85.2 kg)   10/25/17 186 lb (84.4 kg)   17 182 lb 1.6 oz (82.6 kg)              We Performed the Following     Comprehensive metabolic panel     Lipid panel reflex to direct LDL Fasting     Magnesium          Today's Medication Changes          These changes are accurate as of 18 10:52 AM.  If you have any questions, ask your nurse or doctor.               Start taking these medicines.        Dose/Directions    omeprazole 20 MG CR capsule   Commonly known as:  priLOSEC   Used for:  Gastroesophageal reflux disease, esophagitis presence not specified   Started by:  Adri Malcolm MD        Dose:  20 mg   Take 1 capsule (20 mg) by mouth daily   Quantity:  30 capsule   Refills:  1       pravastatin 10 MG tablet   Commonly known as:  PRAVACHOL   Used for:  Hyperlipidemia LDL goal <130   Started by:  Adri Malcolm MD        Dose:  10 mg   Take 1 tablet (10 mg) by mouth daily   Quantity:  90 tablet   Refills:  3         These medicines have changed or have updated prescriptions.        Dose/Directions    lisinopril 10 MG tablet   Commonly known as:  PRINIVIL/ZESTRIL   This may have changed:  Another medication  with the same name was removed. Continue taking this medication, and follow the directions you see here.   Used for:  Essential hypertension, benign   Changed by:  Adri Malcolm MD        Dose:  10 mg   Take 1 tablet (10 mg) by mouth daily   Quantity:  90 tablet   Refills:  1         Stop taking these medicines if you haven't already. Please contact your care team if you have questions.     simvastatin 20 MG tablet   Commonly known as:  ZOCOR   Stopped by:  Adri Malcolm MD                Where to get your medicines      These medications were sent to [facility] [id number]  [place] [address] AT [address]  [address] , [facility] [address]     Phone:  [phone]     furosemide 20 MG tablet    lisinopril 10 MG tablet    omeprazole 20 MG CR capsule    pravastatin 10 MG tablet                Primary Care Provider Office Phone # Fax #    Adri Malcolm [phone]       [address] [facility] [address]        Equal Access to Services     [facility]: Hadii aad ku hadasho Soomaali, waaxda luqadaha, qaybta kaalmada adeegyada, waxay zuleyma hayeleonora blankenship . So [facility] [phone].    ATENCIÓN: Si habla español, tiene a gunn disposición servicios gratuitos de asistencia lingüística. Llame al [phone].    We comply with applicable federal civil rights laws and [place] laws. We do not discriminate on the basis of race, color, national origin, age, disability, sex, sexual orientation, or gender identity.            Thank you!     Thank you for choosing [facility]  for your care. Our goal is always to provide you with excellent care. Hearing back from our patients is one way we can continue to improve our services. Please take a few minutes to complete the written survey that you may receive in the mail after your visit with us. Thank you!             Your Updated Medication List - Protect others  around you: Learn how to safely use, store and throw away your medicines at www.disposemymeds.org.          This list is accurate as of 4/25/18 10:52 AM.  Always use your most recent med list.                   Brand Name Dispense Instructions for use Diagnosis    ALEVE PO      aleve arthritis prn        aspirin 81 MG tablet      Take 81 mg by mouth daily        CALCIUM 600 + D 600-200 MG-UNIT Tabs      1 tid    Routine gynecological examination       fish oil-omega-3 fatty acids 1000 MG capsule      Take 2 g by mouth daily.        furosemide 20 MG tablet    LASIX    180 tablet    Take 1 tablet (20 mg) by mouth 2 times daily    Edema, unspecified type       ibuprofen 200 MG tablet    ADVIL/MOTRIN    1 tablet    Take 2 tablets by mouth every 8 hours as needed for pain.        lisinopril 10 MG tablet    PRINIVIL/ZESTRIL    90 tablet    Take 1 tablet (10 mg) by mouth daily    Essential hypertension, benign       omeprazole 20 MG CR capsule    priLOSEC    30 capsule    Take 1 capsule (20 mg) by mouth daily    Gastroesophageal reflux disease, esophagitis presence not specified       pravastatin 10 MG tablet    PRAVACHOL    90 tablet    Take 1 tablet (10 mg) by mouth daily    Hyperlipidemia LDL goal <130       sennosides 8.6 MG tablet    SENOKOT     Take 2 tablets by mouth daily

## 2018-04-26 LAB
ALBUMIN SERPL-MCNC: 4.1 G/DL (ref 3.4–5)
ALP SERPL-CCNC: 66 U/L (ref 40–150)
ALT SERPL W P-5'-P-CCNC: 24 U/L (ref 0–50)
ANION GAP SERPL CALCULATED.3IONS-SCNC: 5 MMOL/L (ref 3–14)
AST SERPL W P-5'-P-CCNC: 14 U/L (ref 0–45)
BILIRUB SERPL-MCNC: 1 MG/DL (ref 0.2–1.3)
BUN SERPL-MCNC: 13 MG/DL (ref 7–30)
CALCIUM SERPL-MCNC: 9.8 MG/DL (ref 8.5–10.1)
CHLORIDE SERPL-SCNC: 106 MMOL/L (ref 94–109)
CHOLEST SERPL-MCNC: 190 MG/DL
CO2 SERPL-SCNC: 29 MMOL/L (ref 20–32)
CREAT SERPL-MCNC: 0.92 MG/DL (ref 0.52–1.04)
GFR SERPL CREATININE-BSD FRML MDRD: 59 ML/MIN/1.7M2
GLUCOSE SERPL-MCNC: 107 MG/DL (ref 70–99)
HDLC SERPL-MCNC: 49 MG/DL
LDLC SERPL CALC-MCNC: 100 MG/DL
MAGNESIUM SERPL-MCNC: 2.5 MG/DL (ref 1.6–2.3)
NONHDLC SERPL-MCNC: 141 MG/DL
POTASSIUM SERPL-SCNC: 4.4 MMOL/L (ref 3.4–5.3)
PROT SERPL-MCNC: 7.7 G/DL (ref 6.8–8.8)
SODIUM SERPL-SCNC: 140 MMOL/L (ref 133–144)
TRIGL SERPL-MCNC: 207 MG/DL

## 2018-05-08 ENCOUNTER — TELEPHONE (OUTPATIENT)
Dept: INTERNAL MEDICINE | Facility: CLINIC | Age: 78
End: 2018-05-08

## 2018-05-08 NOTE — TELEPHONE ENCOUNTER
Cholesterol better than before, continue pravastatin and follow low fat diet and exercise.   Creatinine has normalized.   Magnesium same as before, please advise pt to stop taking magnesium containing laxatives, multivitamins

## 2018-05-16 ENCOUNTER — OFFICE VISIT (OUTPATIENT)
Dept: INTERNAL MEDICINE | Facility: CLINIC | Age: 78
End: 2018-05-16
Payer: MEDICARE

## 2018-05-16 VITALS
DIASTOLIC BLOOD PRESSURE: 84 MMHG | WEIGHT: 186.1 LBS | HEART RATE: 84 BPM | RESPIRATION RATE: 16 BRPM | BODY MASS INDEX: 29.91 KG/M2 | HEIGHT: 66 IN | TEMPERATURE: 98.7 F | SYSTOLIC BLOOD PRESSURE: 144 MMHG | OXYGEN SATURATION: 93 %

## 2018-05-16 DIAGNOSIS — R05.9 COUGH: Primary | ICD-10-CM

## 2018-05-16 PROCEDURE — 99214 OFFICE O/P EST MOD 30 MIN: CPT | Performed by: INTERNAL MEDICINE

## 2018-05-16 RX ORDER — AZITHROMYCIN 250 MG/1
TABLET, FILM COATED ORAL
Qty: 6 TABLET | Refills: 0 | Status: SHIPPED | OUTPATIENT
Start: 2018-05-16 | End: 2018-10-24

## 2018-05-16 RX ORDER — ALBUTEROL SULFATE 90 UG/1
2 AEROSOL, METERED RESPIRATORY (INHALATION) EVERY 6 HOURS
Qty: 1 INHALER | Refills: 0 | Status: SHIPPED | OUTPATIENT
Start: 2018-05-16 | End: 2018-10-24

## 2018-05-16 ASSESSMENT — PAIN SCALES - GENERAL: PAINLEVEL: MODERATE PAIN (4)

## 2018-05-16 NOTE — MR AVS SNAPSHOT
"              After Visit Summary   5/16/2018    Paris Hull    MRN: [id number]           Patient Information     Date Of Birth          1940        Visit Information        Provider Department      5/16/2018 10:00 AM Adri Malcolm MD [facility]        Today's Diagnoses     Cough    -  1       Follow-ups after your visit        Your next 10 appointments already scheduled     Oct 24, 2018 10:20 AM CDT   SHORT with Adri Malcolm MD   [facility] ([facility])    [address]  [facility] [address]   [phone]              Who to contact     If you have questions or need follow up information about today's clinic visit or your schedule please contact [facility] directly at [phone].  Normal or non-critical lab and imaging results will be communicated to you by MyChart, letter or phone within 4 business days after the clinic has received the results. If you do not hear from us within 7 days, please contact the clinic through MyChart or phone. If you have a critical or abnormal lab result, we will notify you by phone as soon as possible.  Submit refill requests through [employer] or call your pharmacy and they will forward the refill request to us. Please allow 3 business days for your refill to be completed.          Additional Information About Your Visit        Care EveryWhere ID     This is your Care EveryWhere ID. This could be used by other organizations to access your [place] medical records  [id number]        Your Vitals Were     Pulse Temperature Respirations Height Pulse Oximetry Breastfeeding?    84 98.7  F (37.1  C) (Oral) 16 5' 6\" (1.676 m) 93% No    BMI (Body Mass Index)                   30.04 kg/m2            Blood Pressure from Last 3 Encounters:   05/16/18 144/84   04/25/18 132/84   10/25/17 128/78    Weight from Last 3 Encounters:   05/16/18 186 lb 1.6 oz (84.4 kg)   04/25/18 187 lb " 12.8 oz (85.2 kg)   10/25/17 186 lb (84.4 kg)              Today, you had the following     No orders found for display         Today's Medication Changes          These changes are accurate as of 5/16/18 10:55 AM.  If you have any questions, ask your nurse or doctor.               Start taking these medicines.        Dose/Directions    albuterol 108 (90 Base) MCG/ACT Inhaler   Commonly known as:  PROAIR HFA/PROVENTIL HFA/VENTOLIN HFA   Used for:  Cough   Started by:  Adri Malcolm MD        Dose:  2 puff   Inhale 2 puffs into the lungs every 6 hours   Quantity:  1 Inhaler   Refills:  0       azithromycin 250 MG tablet   Commonly known as:  ZITHROMAX   Used for:  Cough   Started by:  Adri Malcolm MD        Two tablets first day, then one tablet daily for four days.   Quantity:  6 tablet   Refills:  0            Where to get your medicines      These medications were sent to [facility] [address] [place], [place] - [address] [place] [address] AT [address] of [facility] [address]  [address] [place] [address]     Phone:  [phone]     albuterol 108 (90 Base) MCG/ACT Inhaler    azithromycin 250 MG tablet                Primary Care Provider Office Phone # Fax #    Adri Malcolm [phone]       [address] [facility] [address]        Equal Access to Services     [facility]: Hadii aad ku hadasho Soomaali, waaxda luqadaha, qaybta kaalmada adeegyada, waxay zuleyma hayaan jackie blankenship . So [facility] [phone].    ATENCIÓN: Si habla español, tiene a gunn disposición servicios gratuitos de asistencia lingüística. Daveame al [phone].    We comply with applicable federal civil rights laws and [place] laws. We do not discriminate on the basis of race, color, national origin, age, disability, sex, sexual orientation, or gender identity.            Thank you!     Thank you for choosing [facility]  for your care. Our goal is always to provide you  with excellent care. Hearing back from our patients is one way we can continue to improve our services. Please take a few minutes to complete the written survey that you may receive in the mail after your visit with us. Thank you!             Your Updated Medication List - Protect others around you: Learn how to safely use, store and throw away your medicines at www.disposemymeds.org.          This list is accurate as of 5/16/18 10:55 AM.  Always use your most recent med list.                   Brand Name Dispense Instructions for use Diagnosis    albuterol 108 (90 Base) MCG/ACT Inhaler    PROAIR HFA/PROVENTIL HFA/VENTOLIN HFA    1 Inhaler    Inhale 2 puffs into the lungs every 6 hours    Cough       ALEVE PO      aleve arthritis prn        aspirin 81 MG tablet      Take 81 mg by mouth daily        azithromycin 250 MG tablet    ZITHROMAX    6 tablet    Two tablets first day, then one tablet daily for four days.    Cough       CALCIUM 600 + D 600-200 MG-UNIT Tabs      1 tid    Routine gynecological examination       fish oil-omega-3 fatty acids 1000 MG capsule      Take 2 g by mouth daily.        furosemide 20 MG tablet    LASIX    180 tablet    Take 1 tablet (20 mg) by mouth 2 times daily    Edema, unspecified type       ibuprofen 200 MG tablet    ADVIL/MOTRIN    1 tablet    Take 2 tablets by mouth every 8 hours as needed for pain.        lisinopril 10 MG tablet    PRINIVIL/ZESTRIL    90 tablet    Take 1 tablet (10 mg) by mouth daily    Essential hypertension, benign       omeprazole 20 MG CR capsule    priLOSEC    30 capsule    Take 1 capsule (20 mg) by mouth daily    Gastroesophageal reflux disease, esophagitis presence not specified       pravastatin 10 MG tablet    PRAVACHOL    90 tablet    Take 1 tablet (10 mg) by mouth daily    Hyperlipidemia LDL goal <130       sennosides 8.6 MG tablet    SENOKOT     Take 2 tablets by mouth daily

## 2018-05-16 NOTE — PROGRESS NOTES
SUBJECTIVE:   Paris Hull is a 78 year old female who presents to clinic today for the following health issues:    Pt is a 78 year old female who is seen here to day with c/o cough of 5 dyas duration, productive cough with yellow sputum .no fever.no nasal drainage.has post nasal drip, has cough with deep breathing and wheezing .no h/o asthma , taking OTC cough syrup and cough drops.         Past Medical History:   Diagnosis Date     Essential hypertension, benign      Mixed hyperlipidemia      Osteoporosis, unspecified      Other seborrheic keratosis     sees DERM     Tobacco use disorder     quit smoking in 09/12       Current Outpatient Prescriptions   Medication Sig Dispense Refill     albuterol (PROAIR HFA/PROVENTIL HFA/VENTOLIN HFA) 108 (90 Base) MCG/ACT Inhaler Inhale 2 puffs into the lungs every 6 hours 1 Inhaler 0     ALEVE OR aleve arthritis prn       aspirin 81 MG tablet Take 81 mg by mouth daily       azithromycin (ZITHROMAX) 250 MG tablet Two tablets first day, then one tablet daily for four days. 6 tablet 0     CALCIUM 600 + D 600-200 MG-UNIT OR TABS 1 tid  0     fish oil-omega-3 fatty acids (FISH OIL) 1000 MG capsule Take 2 g by mouth daily.       furosemide (LASIX) 20 MG tablet Take 1 tablet (20 mg) by mouth 2 times daily 180 tablet 1     ibuprofen (ADVIL,MOTRIN) 200 MG tablet Take 2 tablets by mouth every 8 hours as needed for pain. 1 tablet 0     lisinopril (PRINIVIL/ZESTRIL) 10 MG tablet Take 1 tablet (10 mg) by mouth daily 90 tablet 1     omeprazole (PRILOSEC) 20 MG CR capsule Take 1 capsule (20 mg) by mouth daily 30 capsule 1     pravastatin (PRAVACHOL) 10 MG tablet Take 1 tablet (10 mg) by mouth daily 90 tablet 3     sennosides (SENOKOT) 8.6 MG tablet Take 2 tablets by mouth daily         ROS:  General:no fever  ENT; PND, no sore throat  CVS;negative  RESP:productive cough      Blood pressure 144/84, pulse 84, temperature 98.7  F (37.1  C), temperature source Oral, resp. rate 16, height  "5' 6\" (1.676 m), weight 186 lb 1.6 oz (84.4 kg), SpO2 93 %, not currently breastfeeding.  GENERAL:healthy, alert, no distress  HEENT-pupils equal and reactive to light and accommodation, oropharynx clear,TM's clear.  NECK: NEGATIVE  RESP:scant exp wheezing noted, no crackles.  CV: regular rate and rhythm     MS;:no peripheral edema,no calf tenderness      ASSESSMENT AND PLAN:     (R05) Cough  (primary encounter diagnosis)  Plan: azithromycin (ZITHROMAX) 250 MG tablet, and albuterol (PROAIR HFA/PROVENTIL HFA/VENTOLIN         HFA) 108 (90 Base) MCG/ACT Inhaler as directed.explained clearly about the medication,insructions and side effects. Call or return to clinic prn if these symtoms worsen, fail to improve as anticipated, or if new symptoms develop.                "

## 2018-06-28 ENCOUNTER — HOSPITAL ENCOUNTER (OUTPATIENT)
Dept: MAMMOGRAPHY | Facility: CLINIC | Age: 78
Discharge: HOME | End: 2018-06-28
Attending: INTERNAL MEDICINE | Admitting: INTERNAL MEDICINE
Payer: MEDICARE

## 2018-06-28 DIAGNOSIS — Z12.31 VISIT FOR SCREENING MAMMOGRAM: ICD-10-CM

## 2018-06-28 PROCEDURE — 77063 BREAST TOMOSYNTHESIS BI: CPT

## 2018-08-06 DIAGNOSIS — K21.9 GASTROESOPHAGEAL REFLUX DISEASE, ESOPHAGITIS PRESENCE NOT SPECIFIED: ICD-10-CM

## 2018-08-06 NOTE — TELEPHONE ENCOUNTER
"Requested Prescriptions   Pending Prescriptions Disp Refills     omeprazole (PRILOSEC) 20 MG CR capsule [Pharmacy Med Name: OMEPRAZOLE 20MG CAPSULES]  Last Written Prescription Date:  4/25/2018  Last Fill Quantity: 30,  # refills: 1   Last office visit: 5/16/2018 with prescribing provider:     Future Office Visit:   Next 5 appointments (look out 90 days)     Oct 24, 2018 10:20 AM CDT   SHORT with Adri Malcolm MD   [facility]    [address]  [facility] [address]   [phone]                30 capsule 0     Sig: TAKE 1 CAPSULE(20 MG) BY MOUTH DAILY    PPI Protocol Failed    8/6/2018  9:04 AM       Failed - No diagnosis of osteoporosis on record       Passed - Not on Clopidogrel (unless Pantoprazole ordered)       Passed - Recent (12 mo) or future (30 days) visit within the authorizing provider's specialty    Patient had office visit in the last 12 months or has a visit in the next 30 days with authorizing provider or within the authorizing provider's specialty.  See \"Patient Info\" tab in inbasket, or \"Choose Columns\" in Meds & Orders section of the refill encounter.           Passed - Patient is age 18 or older       Passed - No active pregnacy on record       Passed - No positive pregnancy test in past 12 months        "

## 2018-10-24 ENCOUNTER — OFFICE VISIT (OUTPATIENT)
Dept: INTERNAL MEDICINE | Facility: CLINIC | Age: 78
End: 2018-10-24
Payer: MEDICARE

## 2018-10-24 VITALS
HEART RATE: 86 BPM | TEMPERATURE: 98.5 F | RESPIRATION RATE: 14 BRPM | WEIGHT: 182.6 LBS | SYSTOLIC BLOOD PRESSURE: 116 MMHG | BODY MASS INDEX: 29.35 KG/M2 | OXYGEN SATURATION: 96 % | HEIGHT: 66 IN | DIASTOLIC BLOOD PRESSURE: 82 MMHG

## 2018-10-24 DIAGNOSIS — G47.00 INSOMNIA, UNSPECIFIED TYPE: ICD-10-CM

## 2018-10-24 DIAGNOSIS — R60.9 EDEMA, UNSPECIFIED TYPE: ICD-10-CM

## 2018-10-24 DIAGNOSIS — Z23 NEED FOR SHINGLES VACCINE: ICD-10-CM

## 2018-10-24 DIAGNOSIS — I10 ESSENTIAL HYPERTENSION, BENIGN: ICD-10-CM

## 2018-10-24 DIAGNOSIS — M81.0 OSTEOPOROSIS, UNSPECIFIED OSTEOPOROSIS TYPE, UNSPECIFIED PATHOLOGICAL FRACTURE PRESENCE: Primary | ICD-10-CM

## 2018-10-24 DIAGNOSIS — Z23 NEED FOR PROPHYLACTIC VACCINATION AND INOCULATION AGAINST INFLUENZA: ICD-10-CM

## 2018-10-24 DIAGNOSIS — K21.9 GASTROESOPHAGEAL REFLUX DISEASE, ESOPHAGITIS PRESENCE NOT SPECIFIED: ICD-10-CM

## 2018-10-24 DIAGNOSIS — E78.5 HYPERLIPIDEMIA LDL GOAL <130: ICD-10-CM

## 2018-10-24 DIAGNOSIS — R05.9 COUGH: ICD-10-CM

## 2018-10-24 PROCEDURE — 80053 COMPREHEN METABOLIC PANEL: CPT | Performed by: INTERNAL MEDICINE

## 2018-10-24 PROCEDURE — 36415 COLL VENOUS BLD VENIPUNCTURE: CPT | Performed by: INTERNAL MEDICINE

## 2018-10-24 PROCEDURE — 90750 HZV VACC RECOMBINANT IM: CPT | Performed by: INTERNAL MEDICINE

## 2018-10-24 PROCEDURE — 90471 IMMUNIZATION ADMIN: CPT | Performed by: INTERNAL MEDICINE

## 2018-10-24 PROCEDURE — 90662 IIV NO PRSV INCREASED AG IM: CPT | Performed by: INTERNAL MEDICINE

## 2018-10-24 PROCEDURE — 80061 LIPID PANEL: CPT | Performed by: INTERNAL MEDICINE

## 2018-10-24 PROCEDURE — G0008 ADMIN INFLUENZA VIRUS VAC: HCPCS | Mod: 59 | Performed by: INTERNAL MEDICINE

## 2018-10-24 PROCEDURE — 99214 OFFICE O/P EST MOD 30 MIN: CPT | Mod: 25 | Performed by: INTERNAL MEDICINE

## 2018-10-24 RX ORDER — ALBUTEROL SULFATE 90 UG/1
2 AEROSOL, METERED RESPIRATORY (INHALATION) EVERY 6 HOURS
Qty: 1 INHALER | Refills: 0 | Status: SHIPPED | OUTPATIENT
Start: 2018-10-24 | End: 2020-10-07

## 2018-10-24 RX ORDER — ESZOPICLONE 1 MG/1
TABLET, FILM COATED ORAL
Qty: 14 TABLET | Refills: 0 | Status: SHIPPED | OUTPATIENT
Start: 2018-10-24 | End: 2019-04-29

## 2018-10-24 RX ORDER — LISINOPRIL 10 MG/1
10 TABLET ORAL DAILY
Qty: 90 TABLET | Refills: 1 | Status: SHIPPED | OUTPATIENT
Start: 2018-10-24 | End: 2019-04-29

## 2018-10-24 RX ORDER — FUROSEMIDE 20 MG
20 TABLET ORAL 2 TIMES DAILY
Qty: 180 TABLET | Refills: 1 | Status: SHIPPED | OUTPATIENT
Start: 2018-10-24 | End: 2019-04-29

## 2018-10-24 RX ORDER — PRAVASTATIN SODIUM 10 MG
10 TABLET ORAL DAILY
Qty: 90 TABLET | Refills: 3 | Status: SHIPPED | OUTPATIENT
Start: 2018-10-24 | End: 2019-04-29 | Stop reason: ALTCHOICE

## 2018-10-24 NOTE — NURSING NOTE
"/82  Pulse 86  Temp 98.5  F (36.9  C) (Oral)  Resp 14  Ht 5' 6.25\" (1.683 m)  Wt 182 lb 9.6 oz (82.8 kg)  SpO2 96%  BMI 29.25 kg/m2  Patient in for follow up on lipids and HTN.  Annelise Gibson, JOHN    "

## 2018-10-24 NOTE — PROGRESS NOTES
SUBJECTIVE:   Paris Hull is a 78 year old female who presents to clinic today for the following health issues:      Hyperlipidemia Follow-Up      Rate your low fat/cholesterol diet?: good    Taking statin?  Yes, possible muscle aches from statin    Other lipid medications/supplements?:  none    Hypertension Follow-up      Outpatient blood pressures are not being checked.    Low Salt Diet: no added salt    Osteoporosis follow up  ; was on Boniva for 15 yrs and stopped, last Dexa      Edema follow up; on lasix with symptom relief.     Patient complains of Insomnia/ sleep problems, has tried trazodone in the past without much symptom relief and patient did try her 's Lunesta and requesting prescription for Lunesta patient takes it 2-3 times a week.    Patient is requesting second dose of shingles vaccine today.        Amount of exercise or physical activity: 6-7 days/week for an average of 15-30 minutes    Problems taking medications regularly: No    Medication side effects: none    Diet: low salt       Patient Active Problem List   Diagnosis     Disorder of bone and cartilage     Osteoporosis     HYPERLIPIDEMIA LDL GOAL <130     Central retinal vein occlusion     Essential hypertension, benign     Edema, unspecified type     Past Surgical History:   Procedure Laterality Date     COLONOSCOPY       COLONOSCOPY N/A 2015    Procedure: COLONOSCOPY;  Surgeon: Santosh Liao MD;  Location:  GI     HC REMOVAL OF TONSILS,<11 Y/O         Social History   Substance Use Topics     Smoking status: Former Smoker     Packs/day: 0.50     Types: Cigarettes     Smokeless tobacco: Former User     Quit date: 2012      Comment: Has smoked for over 30 yrs.     Alcohol use No     Family History   Problem Relation Age of Onset     Cardiovascular Mother      a-fib/living age 87     GASTROINTESTINAL DISEASE Mother      diverticulosis     HEART DISEASE Father       age 80-CVA     Cardiovascular Father       Neurologic Disorder Father      parkinsons     Family History Negative Sister      age 59     Family History Negative Brother      age 67     Colon Cancer No family hx of          Current Outpatient Prescriptions   Medication Sig Dispense Refill     albuterol (PROAIR HFA/PROVENTIL HFA/VENTOLIN HFA) 108 (90 Base) MCG/ACT inhaler Inhale 2 puffs into the lungs every 6 hours 1 Inhaler 0     ALEVE OR aleve arthritis prn       aspirin 81 MG tablet Take 81 mg by mouth daily       CALCIUM 600 + D 600-200 MG-UNIT OR TABS 1 tid  0     eszopiclone (LUNESTA) 1 MG TABS tablet 1 tab by mouth 2-3 times per week as needed for  insomnia 14 tablet 0     fish oil-omega-3 fatty acids (FISH OIL) 1000 MG capsule Take 2 g by mouth daily.       furosemide (LASIX) 20 MG tablet Take 1 tablet (20 mg) by mouth 2 times daily 180 tablet 1     garlic 150 MG TABS tablet Take 150 mg by mouth daily       ibuprofen (ADVIL,MOTRIN) 200 MG tablet Take 2 tablets by mouth every 8 hours as needed for pain. 1 tablet 0     lisinopril (PRINIVIL/ZESTRIL) 10 MG tablet Take 1 tablet (10 mg) by mouth daily 90 tablet 1     omeprazole (PRILOSEC) 20 MG CR capsule Take 1 capsule (20 mg) by mouth daily 90 capsule 1     pravastatin (PRAVACHOL) 10 MG tablet Take 1 tablet (10 mg) by mouth daily 90 tablet 3     sennosides (SENOKOT) 8.6 MG tablet Take 2 tablets by mouth daily       [DISCONTINUED] albuterol (PROAIR HFA/PROVENTIL HFA/VENTOLIN HFA) 108 (90 Base) MCG/ACT Inhaler Inhale 2 puffs into the lungs every 6 hours 1 Inhaler 0     [DISCONTINUED] furosemide (LASIX) 20 MG tablet Take 1 tablet (20 mg) by mouth 2 times daily 180 tablet 1     [DISCONTINUED] lisinopril (PRINIVIL/ZESTRIL) 10 MG tablet Take 1 tablet (10 mg) by mouth daily 90 tablet 1     [DISCONTINUED] pravastatin (PRAVACHOL) 10 MG tablet Take 1 tablet (10 mg) by mouth daily 90 tablet 3       Reviewed and updated as needed this visit by clinical staff  Tobacco  Allergies  Meds  Med Hx  Surg Hx  Fam Hx  " Soc Hx      Reviewed and updated as needed this visit by Provider         ROS:  CONSTITUTIONAL: NEGATIVE for fever, chills, change in weight  EYES: NEGATIVE for vision changes or irritation  ENT/MOUTH: NEGATIVE for ear, mouth and throat problems  RESP: NEGATIVE for significant cough or SOB  CV: NEGATIVE for chest pain, palpitations or peripheral edema  MUSCULOSKELETAL: NEGATIVE for significant arthralgias or myalgia  NEURO: NEGATIVE for weakness, dizziness or paresthesias  Psych; insomnia     OBJECTIVE:                                                    /82  Pulse 86  Temp 98.5  F (36.9  C) (Oral)  Resp 14  Ht 5' 6.25\" (1.683 m)  Wt 182 lb 9.6 oz (82.8 kg)  SpO2 96%  BMI 29.25 kg/m2  Body mass index is 29.25 kg/(m^2).   GENERAL: healthy, alert, well nourished, well hydrated, no distress  EYES: Eyes grossly normal to inspection, extraocular movements - intact, and PERRL  HENT: ear canals- normal; TMs- normal; Nose- normal; Mouth- no ulcers, no lesions  NECK: no tenderness, no adenopathy, no asymmetry, no masses, no stiffness  RESP: lungs clear to auscultation - no rales, no rhonchi, no wheezes  CV: regular rates and rhythm,  -  MS: extremities-   no edema, no calf tenderness  NEURO: strength and tone- normal, sensory exam- grossly normal, mentation- intact, speech- normal, reflexes- symmetric         ASSESSMENT/PLAN:                                                       (I10) Essential hypertension, benign  Comment: Blood pressure well controlled  Plan: lisinopril (PRINIVIL/ZESTRIL) 10 MG tablet, refilled as directed.explained clearly about the medication,insructions and side effects.  check Comprehensive metabolic panel.Advised to follow low salt diet and exercise and f/u in 6 mths.        (R60.9) Edema, unspecified type  Plan: furosemide (LASIX) 20 MG tablet            (E78.5) Hyperlipidemia LDL goal <130  Plan: pravastatin (PRAVACHOL) 10 MG tablet, Lipid         panel reflex to direct LDL Fasting    "         (M81.0) Osteoporosis, unspecified osteoporosis type, unspecified pathological fracture presence   Plan: DX Hip/Pelvis/Spine, continue calcium and vitamin D supplementation          (R05) Cough  Plan: albuterol (PROAIR HFA/PROVENTIL HFA/VENTOLIN         HFA) 108 (90 Base) MCG/ACT inhaler            (G47.00) Insomnia, unspecified type  Plan: eszopiclone (LUNESTA) 1 MG TABS tablet            (K21.9) Gastroesophageal reflux disease, esophagitis presence not specified  Plan: omeprazole (PRILOSEC) 20 MG CR capsule          All above meds refilled.explained clearly about the medication,insructions and side effects.Advised not to drive or operate any machinery while taking Lunesta .      (Z23) Need for prophylactic vaccination and inoculation against influenza  Plan: FLU VACCINE, INCREASED ANTIGEN, PRESV FREE, AGE        65+ [81503], ADMIN INFLUENZA (For MEDICARE         Patients ONLY) []            (Z23) Need for shingles vaccine  Plan: SHINGRIX [55091], Each additional admin.          (Right click and add QUANTITY)  [10313]                Adri Malcolm MD  [facility]

## 2018-10-24 NOTE — PROGRESS NOTES

## 2018-10-24 NOTE — MR AVS SNAPSHOT
"              After Visit Summary   10/24/2018    Paris Hull    MRN: [id number]           Patient Information     Date Of Birth          1940        Visit Information        Provider Department      10/24/2018 10:20 AM Adri Malcolm MD [facility]        Today's Diagnoses     Cough        Edema, unspecified type        Essential hypertension, benign        Gastroesophageal reflux disease, esophagitis presence not specified        Hyperlipidemia LDL goal <130           Follow-ups after your visit        Who to contact     If you have questions or need follow up information about today's clinic visit or your schedule please contact [facility] directly at [phone].  Normal or non-critical lab and imaging results will be communicated to you by MyChart, letter or phone within 4 business days after the clinic has received the results. If you do not hear from us within 7 days, please contact the clinic through MyChart or phone. If you have a critical or abnormal lab result, we will notify you by phone as soon as possible.  Submit refill requests through [employer] or call your pharmacy and they will forward the refill request to us. Please allow 3 business days for your refill to be completed.          Additional Information About Your Visit        Care EveryWhere ID     This is your Care EveryWhere ID. This could be used by other organizations to access your [place] medical records  [id number]        Your Vitals Were     Pulse Temperature Respirations Height Pulse Oximetry BMI (Body Mass Index)    86 98.5  F (36.9  C) (Oral) 14 5' 6.25\" (1.683 m) 96% 29.25 kg/m2       Blood Pressure from Last 3 Encounters:   10/24/18 116/82   05/16/18 144/84   04/25/18 132/84    Weight from Last 3 Encounters:   10/24/18 182 lb 9.6 oz (82.8 kg)   05/16/18 186 lb 1.6 oz (84.4 kg)   04/25/18 187 lb 12.8 oz (85.2 kg)              Today, you had the following     No orders found for " display         Today's Medication Changes          These changes are accurate as of 10/24/18 10:42 AM.  If you have any questions, ask your nurse or doctor.               Stop taking these medicines if you haven't already. Please contact your care team if you have questions.     azithromycin 250 MG tablet   Commonly known as:  ZITHROMAX   Stopped by:  Adri Maclolm MD                    Primary Care Provider Office Phone # Fax #    Adri Malcolm [phone]       [address] [facility] [address]        Equal Access to Services     [facility]: Hadii aad ku hadasho Soomaali, waaxda luqadaha, qaybta kaalmada adeegyada, waxelaine amor hayeleonora blankenship . So [facility] [phone].    ATENCIÓN: Si habla español, tiene a gunn disposición servicios gratuitos de asistencia lingüística. [facility] [phone].    We comply with applicable federal civil rights laws and [place] laws. We do not discriminate on the basis of race, color, national origin, age, disability, sex, sexual orientation, or gender identity.            Thank you!     Thank you for choosing [facility]  for your care. Our goal is always to provide you with excellent care. Hearing back from our patients is one way we can continue to improve our services. Please take a few minutes to complete the written survey that you may receive in the mail after your visit with us. Thank you!             Your Updated Medication List - Protect others around you: Learn how to safely use, store and throw away your medicines at www.disposemymeds.org.          This list is accurate as of 10/24/18 10:42 AM.  Always use your most recent med list.                   Brand Name Dispense Instructions for use Diagnosis    albuterol 108 (90 Base) MCG/ACT inhaler    PROAIR HFA/PROVENTIL HFA/VENTOLIN HFA    1 Inhaler    Inhale 2 puffs into the lungs every 6 hours    Cough       ALEVE PO      aleve arthritis prn         aspirin 81 MG tablet      Take 81 mg by mouth daily        CALCIUM 600 + D 600-200 MG-UNIT Tabs      1 tid    Routine gynecological examination       fish oil-omega-3 fatty acids 1000 MG capsule      Take 2 g by mouth daily.        furosemide 20 MG tablet    LASIX    180 tablet    Take 1 tablet (20 mg) by mouth 2 times daily    Edema, unspecified type       garlic 150 MG Tabs tablet      Take 150 mg by mouth daily        ibuprofen 200 MG tablet    ADVIL/MOTRIN    1 tablet    Take 2 tablets by mouth every 8 hours as needed for pain.        lisinopril 10 MG tablet    PRINIVIL/ZESTRIL    90 tablet    Take 1 tablet (10 mg) by mouth daily    Essential hypertension, benign       omeprazole 20 MG CR capsule    priLOSEC    90 capsule    TAKE 1 CAPSULE(20 MG) BY MOUTH DAILY    Gastroesophageal reflux disease, esophagitis presence not specified       pravastatin 10 MG tablet    PRAVACHOL    90 tablet    Take 1 tablet (10 mg) by mouth daily    Hyperlipidemia LDL goal <130       sennosides 8.6 MG tablet    SENOKOT     Take 2 tablets by mouth daily

## 2018-10-25 LAB
ALBUMIN SERPL-MCNC: 4 G/DL (ref 3.4–5)
ALP SERPL-CCNC: 68 U/L (ref 40–150)
ALT SERPL W P-5'-P-CCNC: 24 U/L (ref 0–50)
ANION GAP SERPL CALCULATED.3IONS-SCNC: 7 MMOL/L (ref 3–14)
AST SERPL W P-5'-P-CCNC: 19 U/L (ref 0–45)
BILIRUB SERPL-MCNC: 0.9 MG/DL (ref 0.2–1.3)
BUN SERPL-MCNC: 15 MG/DL (ref 7–30)
CALCIUM SERPL-MCNC: 10.3 MG/DL (ref 8.5–10.1)
CHLORIDE SERPL-SCNC: 104 MMOL/L (ref 94–109)
CHOLEST SERPL-MCNC: 209 MG/DL
CO2 SERPL-SCNC: 29 MMOL/L (ref 20–32)
CREAT SERPL-MCNC: 0.93 MG/DL (ref 0.52–1.04)
GFR SERPL CREATININE-BSD FRML MDRD: 58 ML/MIN/1.7M2
GLUCOSE SERPL-MCNC: 104 MG/DL (ref 70–99)
HDLC SERPL-MCNC: 55 MG/DL
LDLC SERPL CALC-MCNC: 118 MG/DL
NONHDLC SERPL-MCNC: 154 MG/DL
POTASSIUM SERPL-SCNC: 4.1 MMOL/L (ref 3.4–5.3)
PROT SERPL-MCNC: 7.8 G/DL (ref 6.8–8.8)
SODIUM SERPL-SCNC: 140 MMOL/L (ref 133–144)
TRIGL SERPL-MCNC: 182 MG/DL

## 2018-10-29 ENCOUNTER — TELEPHONE (OUTPATIENT)
Dept: BONE DENSITY | Facility: CLINIC | Age: 78
End: 2018-10-29

## 2018-11-01 ENCOUNTER — TELEPHONE (OUTPATIENT)
Dept: INTERNAL MEDICINE | Facility: CLINIC | Age: 78
End: 2018-11-01

## 2018-11-01 DIAGNOSIS — E83.52 SERUM CALCIUM ELEVATED: Primary | ICD-10-CM

## 2018-11-01 NOTE — TELEPHONE ENCOUNTER
all tests normal except slightly above normal calcium, please advise pt to hold off on calcium supplements at this time and rpt calcium and PTH in 3 wks. , lipids stable, triglycerides slightly better than before, continue pravastatin, follow low fat diet and regular exercise,rpt lipids in 1 yr.pl inform pt.

## 2018-11-01 NOTE — TELEPHONE ENCOUNTER
Patient calling to get her lab results sent to her. I do not believe thes have been reviewed by the doctor yet.

## 2018-11-28 DIAGNOSIS — E83.52 SERUM CALCIUM ELEVATED: ICD-10-CM

## 2018-11-28 LAB — PTH-INTACT SERPL-MCNC: 51 PG/ML (ref 18–80)

## 2018-11-28 PROCEDURE — 82310 ASSAY OF CALCIUM: CPT | Performed by: INTERNAL MEDICINE

## 2018-11-28 PROCEDURE — 83970 ASSAY OF PARATHORMONE: CPT | Performed by: INTERNAL MEDICINE

## 2018-11-28 PROCEDURE — 36415 COLL VENOUS BLD VENIPUNCTURE: CPT | Performed by: INTERNAL MEDICINE

## 2018-11-29 LAB — CALCIUM SERPL-MCNC: 9.5 MG/DL (ref 8.5–10.1)

## 2019-04-29 ENCOUNTER — OFFICE VISIT (OUTPATIENT)
Dept: INTERNAL MEDICINE | Facility: CLINIC | Age: 79
End: 2019-04-29
Payer: MEDICARE

## 2019-04-29 VITALS
HEART RATE: 78 BPM | DIASTOLIC BLOOD PRESSURE: 78 MMHG | BODY MASS INDEX: 29.41 KG/M2 | RESPIRATION RATE: 16 BRPM | OXYGEN SATURATION: 95 % | HEIGHT: 66 IN | SYSTOLIC BLOOD PRESSURE: 124 MMHG | TEMPERATURE: 98.5 F | WEIGHT: 183 LBS

## 2019-04-29 DIAGNOSIS — E78.5 HYPERLIPIDEMIA LDL GOAL <130: ICD-10-CM

## 2019-04-29 DIAGNOSIS — K21.9 GASTROESOPHAGEAL REFLUX DISEASE, ESOPHAGITIS PRESENCE NOT SPECIFIED: ICD-10-CM

## 2019-04-29 DIAGNOSIS — M81.0 OSTEOPOROSIS, UNSPECIFIED OSTEOPOROSIS TYPE, UNSPECIFIED PATHOLOGICAL FRACTURE PRESENCE: Primary | ICD-10-CM

## 2019-04-29 DIAGNOSIS — R60.9 EDEMA, UNSPECIFIED TYPE: ICD-10-CM

## 2019-04-29 DIAGNOSIS — I10 ESSENTIAL HYPERTENSION, BENIGN: ICD-10-CM

## 2019-04-29 DIAGNOSIS — G47.00 INSOMNIA, UNSPECIFIED TYPE: ICD-10-CM

## 2019-04-29 PROCEDURE — 99214 OFFICE O/P EST MOD 30 MIN: CPT | Mod: 25 | Performed by: INTERNAL MEDICINE

## 2019-04-29 PROCEDURE — 90471 IMMUNIZATION ADMIN: CPT | Performed by: INTERNAL MEDICINE

## 2019-04-29 PROCEDURE — 90715 TDAP VACCINE 7 YRS/> IM: CPT | Performed by: INTERNAL MEDICINE

## 2019-04-29 PROCEDURE — 80053 COMPREHEN METABOLIC PANEL: CPT | Performed by: INTERNAL MEDICINE

## 2019-04-29 PROCEDURE — 80061 LIPID PANEL: CPT | Performed by: INTERNAL MEDICINE

## 2019-04-29 PROCEDURE — 36415 COLL VENOUS BLD VENIPUNCTURE: CPT | Performed by: INTERNAL MEDICINE

## 2019-04-29 RX ORDER — LISINOPRIL 10 MG/1
10 TABLET ORAL DAILY
Qty: 90 TABLET | Refills: 1 | Status: SHIPPED | OUTPATIENT
Start: 2019-04-29 | End: 2019-10-30

## 2019-04-29 RX ORDER — ROSUVASTATIN CALCIUM 5 MG/1
5 TABLET, COATED ORAL DAILY
Qty: 90 TABLET | Refills: 1 | Status: SHIPPED | OUTPATIENT
Start: 2019-04-29 | End: 2019-10-30

## 2019-04-29 RX ORDER — FUROSEMIDE 20 MG
20 TABLET ORAL 2 TIMES DAILY
Qty: 180 TABLET | Refills: 1 | Status: SHIPPED | OUTPATIENT
Start: 2019-04-29 | End: 2019-10-30

## 2019-04-29 RX ORDER — ESZOPICLONE 1 MG/1
TABLET, FILM COATED ORAL
Qty: 14 TABLET | Refills: 0 | Status: SHIPPED | OUTPATIENT
Start: 2019-04-29 | End: 2019-10-30

## 2019-04-29 ASSESSMENT — MIFFLIN-ST. JEOR: SCORE: 1325.8

## 2019-04-29 NOTE — NURSING NOTE
"/78   Pulse 78   Temp 98.5  F (36.9  C) (Oral)   Resp 16   Ht 1.683 m (5' 6.25\")   Wt 83 kg (183 lb)   SpO2 95%   BMI 29.31 kg/m    Patient in for follow up on HTN and lipids.  Annelise Gibson, JOHN    "

## 2019-04-29 NOTE — PROGRESS NOTES
SUBJECTIVE:   Paris Hull is a 79 year old female who presents to clinic today for the following   health issues:      Hyperlipidemia Follow-Up      Rate your low fat/cholesterol diet?: fair on pravastatin and patient has been experiencing muscle aches and would like to try Crestor instead    Taking statin?  Yes, leg cramps after starting statin    Other lipid medications/supplements?: none    Hypertension Follow-up      Outpatient blood pressures are not being checked.  On lisinopril 10 mg, tolerating well    Low Salt Diet: no added salt    Osteoporosis follow-up; currently on drug holiday, has been on Actonel and Boniva for several years    GERD follow up; on omeprazole prn       Edema follow-up; stable on furosemide with good symptom relief        Amount of exercise or physical activity: 2-3 days/week for an average of 30-45 minutes    Problems taking medications regularly: No    Medication side effects: none    Diet: low salt       Reviewed  and updated as needed this visit by clinical staff  Tobacco  Allergies  Meds  Med Hx  Surg Hx  Fam Hx  Soc Hx        Reviewed and updated as needed this visit by Provider         Patient Active Problem List   Diagnosis     Disorder of bone and cartilage     Osteoporosis     HYPERLIPIDEMIA LDL GOAL <130     Central retinal vein occlusion     Essential hypertension, benign     Edema, unspecified type     Past Surgical History:   Procedure Laterality Date     COLONOSCOPY       COLONOSCOPY N/A 6/25/2015    Procedure: COLONOSCOPY;  Surgeon: Santosh Liao MD;  Location:  GI     HC REMOVAL OF TONSILS,<11 Y/O         Social History     Tobacco Use     Smoking status: Former Smoker     Packs/day: 0.50     Types: Cigarettes     Smokeless tobacco: Former User     Quit date: 9/11/2012     Tobacco comment: Has smoked for over 30 yrs.   Substance Use Topics     Alcohol use: No     Family History   Problem Relation Age of Onset     Cardiovascular Mother          a-fib/living age 87     Gastrointestinal Disease Mother         diverticulosis     Heart Disease Father          age 80-CVA     Cardiovascular Father      Neurologic Disorder Father         parkinsons     Family History Negative Sister         age 59     Family History Negative Brother         age 67     Colon Cancer No family hx of          Current Outpatient Medications   Medication Sig Dispense Refill     albuterol (PROAIR HFA/PROVENTIL HFA/VENTOLIN HFA) 108 (90 Base) MCG/ACT inhaler Inhale 2 puffs into the lungs every 6 hours 1 Inhaler 0     ALEVE OR aleve arthritis prn       aspirin 81 MG tablet Take 81 mg by mouth daily       CALCIUM 600 + D 600-200 MG-UNIT OR TABS 1 tid  0     eszopiclone (LUNESTA) 1 MG tablet 1 tab by mouth 2-3 times per week as needed for  insomnia 14 tablet 0     fish oil-omega-3 fatty acids (FISH OIL) 1000 MG capsule Take 2 g by mouth daily.       furosemide (LASIX) 20 MG tablet Take 1 tablet (20 mg) by mouth 2 times daily 180 tablet 1     ibuprofen (ADVIL,MOTRIN) 200 MG tablet Take 2 tablets by mouth every 8 hours as needed for pain. 1 tablet 0     lisinopril (PRINIVIL/ZESTRIL) 10 MG tablet Take 1 tablet (10 mg) by mouth daily 90 tablet 1     omeprazole (PRILOSEC) 20 MG DR capsule Take 1 capsule (20 mg) by mouth daily 90 capsule 1     pravastatin (PRAVACHOL) 10 MG tablet Take 1 tablet (10 mg) by mouth daily 90 tablet 3     sennosides (SENOKOT) 8.6 MG tablet Take 2 tablets by mouth daily         ROS:  CONSTITUTIONAL: NEGATIVE for fever, chills, change in weight  EYES: NEGATIVE for vision changes or irritation  ENT/MOUTH: NEGATIVE for ear, mouth and throat problems  RESP: NEGATIVE for significant cough or SOB  CV: NEGATIVE for chest pain, palpitations or peripheral edema  MUSCULOSKELETAL: leg cramps   NEURO: NEGATIVE for weakness, dizziness or paresthesias  ROS otherwise negative    OBJECTIVE:                                                    /78   Pulse 78   Temp 98.5  F  "(36.9  C) (Oral)   Resp 16   Ht 1.683 m (5' 6.25\")   Wt 83 kg (183 lb)   SpO2 95%   BMI 29.31 kg/m    Body mass index is 29.31 kg/m .   GENERAL: healthy, alert, well nourished, well hydrated, no distress  EYES: Eyes grossly normal to inspection, extraocular movements - intact, and PERRL  HENT: ear canals- normal; TMs- normal; Nose- normal; Mouth- no ulcers, no lesions  NECK: no tenderness, no adenopathy, no asymmetry, no masses, no stiffness; thyroid- normal to palpation  RESP: lungs clear to auscultation - no rales, no rhonchi, no wheezes  CV: regular rates and rhythm,  -  MS: extremities- no gross deformities noted, no edema, no calf tenderness  NEURO: strength and tone- normal, sensory exam- grossly normal, mentation- intact, speech- normal, reflexes- symmetric       ASSESSMENT/PLAN:                                                         (I10) Essential hypertension, benign  Comment: Blood pressure well controlled  Plan: lisinopril (PRINIVIL/ZESTRIL) 10 MG tablet, refilled as directed.explained clearly about the medication,insructions and side effects.Advised to follow low salt diet and exercise and f/u in 6 mths.  Comprehensive metabolic panel            (R60.9) Edema, unspecified type  Comment: Stable  Plan: furosemide (LASIX) 20 MG tablet refilled as directed.explained clearly about the medication,insructions and side effects.            (K21.9) Gastroesophageal reflux disease, esophagitis presence not specified  Plan: Takes as needed omeprazole (PRILOSEC) 20 MG DR capsule.explained clearly about the medication,insructions and side effects including the bone thinning, patient understands            (E78.5) Hyperlipidemia LDL goal <130  Plan: Patient would like to try Crestor, patient thinks pravastatin may be causing her leg cramps.  Will discontinue pravastatin and started on Crestor 5 mg /day , will check lipid panel reflex to direct LDL Fasting            (G47.00) Insomnia, unspecified type  Plan: " Takes occasional eszopiclone (LUNESTA) 1 MG tablet refilled as directed.explained clearly about the medication,insructions and side effects.            (M81.0) Osteoporosis, unspecified osteoporosis type, unspecified pathological fracture presence    Plan: On a drug holiday at this time and also calcium on hold due to elevated calcium level,   DEXA has been scheduled    Due for tetanus vaccine-Tdap given today      Adri Malcolm MD  [facility]

## 2019-04-30 LAB
ALBUMIN SERPL-MCNC: 3.8 G/DL (ref 3.4–5)
ALP SERPL-CCNC: 65 U/L (ref 40–150)
ALT SERPL W P-5'-P-CCNC: 20 U/L (ref 0–50)
ANION GAP SERPL CALCULATED.3IONS-SCNC: 5 MMOL/L (ref 3–14)
AST SERPL W P-5'-P-CCNC: 16 U/L (ref 0–45)
BILIRUB SERPL-MCNC: 0.9 MG/DL (ref 0.2–1.3)
BUN SERPL-MCNC: 16 MG/DL (ref 7–30)
CALCIUM SERPL-MCNC: 9.4 MG/DL (ref 8.5–10.1)
CHLORIDE SERPL-SCNC: 108 MMOL/L (ref 94–109)
CHOLEST SERPL-MCNC: 232 MG/DL
CO2 SERPL-SCNC: 27 MMOL/L (ref 20–32)
CREAT SERPL-MCNC: 0.95 MG/DL (ref 0.52–1.04)
GFR SERPL CREATININE-BSD FRML MDRD: 57 ML/MIN/{1.73_M2}
GLUCOSE SERPL-MCNC: 110 MG/DL (ref 70–99)
HDLC SERPL-MCNC: 50 MG/DL
LDLC SERPL CALC-MCNC: 145 MG/DL
NONHDLC SERPL-MCNC: 182 MG/DL
POTASSIUM SERPL-SCNC: 4.2 MMOL/L (ref 3.4–5.3)
PROT SERPL-MCNC: 7.3 G/DL (ref 6.8–8.8)
SODIUM SERPL-SCNC: 140 MMOL/L (ref 133–144)
TRIGL SERPL-MCNC: 185 MG/DL

## 2019-05-29 ENCOUNTER — ANCILLARY PROCEDURE (OUTPATIENT)
Dept: BONE DENSITY | Facility: CLINIC | Age: 79
End: 2019-05-29
Attending: INTERNAL MEDICINE
Payer: MEDICARE

## 2019-05-29 DIAGNOSIS — M81.0 OSTEOPOROSIS, UNSPECIFIED OSTEOPOROSIS TYPE, UNSPECIFIED PATHOLOGICAL FRACTURE PRESENCE: ICD-10-CM

## 2019-05-29 PROCEDURE — 77085 DXA BONE DENSITY AXL VRT FX: CPT | Performed by: INTERNAL MEDICINE

## 2019-06-26 ENCOUNTER — HOSPITAL ENCOUNTER (OUTPATIENT)
Dept: MAMMOGRAPHY | Facility: CLINIC | Age: 79
Discharge: HOME | End: 2019-06-26
Attending: INTERNAL MEDICINE | Admitting: INTERNAL MEDICINE
Payer: MEDICARE

## 2019-06-26 DIAGNOSIS — Z12.31 VISIT FOR SCREENING MAMMOGRAM: ICD-10-CM

## 2019-06-26 PROCEDURE — 77063 BREAST TOMOSYNTHESIS BI: CPT

## 2019-07-31 DIAGNOSIS — E78.5 HYPERLIPIDEMIA LDL GOAL <130: ICD-10-CM

## 2019-07-31 PROCEDURE — 84460 ALANINE AMINO (ALT) (SGPT): CPT | Performed by: INTERNAL MEDICINE

## 2019-07-31 PROCEDURE — 36415 COLL VENOUS BLD VENIPUNCTURE: CPT | Performed by: INTERNAL MEDICINE

## 2019-07-31 PROCEDURE — 84450 TRANSFERASE (AST) (SGOT): CPT | Performed by: INTERNAL MEDICINE

## 2019-07-31 PROCEDURE — 80061 LIPID PANEL: CPT | Performed by: INTERNAL MEDICINE

## 2019-08-01 LAB
ALT SERPL W P-5'-P-CCNC: 25 U/L (ref 0–50)
AST SERPL W P-5'-P-CCNC: 15 U/L (ref 0–45)
CHOLEST SERPL-MCNC: 179 MG/DL
HDLC SERPL-MCNC: 53 MG/DL
LDLC SERPL CALC-MCNC: 96 MG/DL
NONHDLC SERPL-MCNC: 126 MG/DL
TRIGL SERPL-MCNC: 150 MG/DL

## 2019-10-03 ENCOUNTER — HEALTH MAINTENANCE LETTER (OUTPATIENT)
Age: 79
End: 2019-10-03

## 2019-10-30 ENCOUNTER — OFFICE VISIT (OUTPATIENT)
Dept: INTERNAL MEDICINE | Facility: CLINIC | Age: 79
End: 2019-10-30
Payer: MEDICARE

## 2019-10-30 VITALS
DIASTOLIC BLOOD PRESSURE: 65 MMHG | TEMPERATURE: 97.6 F | BODY MASS INDEX: 29.35 KG/M2 | HEART RATE: 79 BPM | SYSTOLIC BLOOD PRESSURE: 150 MMHG | WEIGHT: 182.6 LBS | OXYGEN SATURATION: 97 % | HEIGHT: 66 IN | RESPIRATION RATE: 18 BRPM

## 2019-10-30 DIAGNOSIS — R60.9 EDEMA, UNSPECIFIED TYPE: ICD-10-CM

## 2019-10-30 DIAGNOSIS — I10 ESSENTIAL HYPERTENSION, BENIGN: ICD-10-CM

## 2019-10-30 DIAGNOSIS — E78.5 HYPERLIPIDEMIA LDL GOAL <130: ICD-10-CM

## 2019-10-30 DIAGNOSIS — G47.00 INSOMNIA, UNSPECIFIED TYPE: ICD-10-CM

## 2019-10-30 DIAGNOSIS — K21.9 GASTROESOPHAGEAL REFLUX DISEASE, ESOPHAGITIS PRESENCE NOT SPECIFIED: ICD-10-CM

## 2019-10-30 LAB — HGB BLD-MCNC: 13.6 G/DL (ref 11.7–15.7)

## 2019-10-30 PROCEDURE — 85018 HEMOGLOBIN: CPT | Performed by: INTERNAL MEDICINE

## 2019-10-30 PROCEDURE — 99214 OFFICE O/P EST MOD 30 MIN: CPT | Mod: 25 | Performed by: INTERNAL MEDICINE

## 2019-10-30 PROCEDURE — 80053 COMPREHEN METABOLIC PANEL: CPT | Performed by: INTERNAL MEDICINE

## 2019-10-30 PROCEDURE — 90662 IIV NO PRSV INCREASED AG IM: CPT | Performed by: INTERNAL MEDICINE

## 2019-10-30 PROCEDURE — 36415 COLL VENOUS BLD VENIPUNCTURE: CPT | Performed by: INTERNAL MEDICINE

## 2019-10-30 PROCEDURE — G0008 ADMIN INFLUENZA VIRUS VAC: HCPCS | Performed by: INTERNAL MEDICINE

## 2019-10-30 RX ORDER — FUROSEMIDE 20 MG
20 TABLET ORAL 2 TIMES DAILY
Qty: 180 TABLET | Refills: 1 | Status: SHIPPED | OUTPATIENT
Start: 2019-10-30 | End: 2020-04-29

## 2019-10-30 RX ORDER — LISINOPRIL 10 MG/1
15 TABLET ORAL DAILY
Qty: 135 TABLET | Refills: 1 | Status: SHIPPED | OUTPATIENT
Start: 2019-10-30 | End: 2019-11-25 | Stop reason: DRUGHIGH

## 2019-10-30 RX ORDER — ESZOPICLONE 1 MG/1
TABLET, FILM COATED ORAL
Qty: 14 TABLET | Refills: 0 | Status: SHIPPED | OUTPATIENT
Start: 2019-10-30 | End: 2020-04-29

## 2019-10-30 RX ORDER — NAPROXEN SODIUM 220 MG
TABLET ORAL
Status: CANCELLED | OUTPATIENT
Start: 2019-10-30

## 2019-10-30 RX ORDER — ROSUVASTATIN CALCIUM 5 MG/1
5 TABLET, COATED ORAL DAILY
Qty: 90 TABLET | Refills: 1 | Status: SHIPPED | OUTPATIENT
Start: 2019-10-30 | End: 2020-04-29

## 2019-10-30 ASSESSMENT — MIFFLIN-ST. JEOR: SCORE: 1323.99

## 2019-10-30 NOTE — NURSING NOTE
"BP (!) 150/65 (BP Location: Left arm, Patient Position: Sitting, Cuff Size: Adult Regular)   Pulse 79   Temp 97.6  F (36.4  C) (Oral)   Resp 18   Ht 1.683 m (5' 6.25\")   Wt 82.8 kg (182 lb 9.6 oz)   SpO2 97%   BMI 29.25 kg/m      "

## 2019-10-30 NOTE — PROGRESS NOTES
Subjective     Paris Hull is a 79 year old female who presents to clinic today for the following health issues:    HPI      Hyperlipidemia Follow-Up      Are you having any of the following symptoms? (Select all that apply)  No complaints of shortness of breath, chest pain or pressure.  No increased sweating or nausea with activity.  No left-sided neck or arm pain.  No complaints of pain in calves when walking 1-2 blocks.    Are you regularly taking any medication or supplement to lower your cholesterol?   Yes- rosuvastatin    Are you having muscle aches or other side effects that you think could be caused by your cholesterol lowering medication?  No    Hypertension Follow-up      Do you check your blood pressure regularly outside of the clinic? No     Are you following a low salt diet? No    Are your blood pressures ever more than 140 on the top number (systolic) OR more   than 90 on the bottom number (diastolic), for example 140/90? No    Insomnia follow-up: On Lunesta 1 mg 2-3 times a week with good symptom relief.        How many servings of fruits and vegetables do you eat daily?  2-3    On average, how many sweetened beverages do you drink each day (soda, juice, sweet tea, etc)?   0    How many days per week do you miss taking your medication? 0         Patient Active Problem List   Diagnosis     Disorder of bone and cartilage     Osteoporosis     HYPERLIPIDEMIA LDL GOAL <130     Central retinal vein occlusion     Essential hypertension, benign     Edema, unspecified type     Past Surgical History:   Procedure Laterality Date     COLONOSCOPY       COLONOSCOPY N/A 6/25/2015    Procedure: COLONOSCOPY;  Surgeon: Santosh Liao MD;  Location:  GI     HC REMOVAL OF TONSILS,<13 Y/O         Social History     Tobacco Use     Smoking status: Former Smoker     Packs/day: 0.50     Types: Cigarettes     Smokeless tobacco: Former User     Quit date: 9/11/2012     Tobacco comment: Has smoked for over 30 yrs.    Substance Use Topics     Alcohol use: No     Family History   Problem Relation Age of Onset     Cardiovascular Mother         a-fib/living age 87     Gastrointestinal Disease Mother         diverticulosis     Heart Disease Father          age 80-CVA     Cardiovascular Father      Neurologic Disorder Father         parkinsons     Family History Negative Sister         age 59     Family History Negative Brother         age 67     Colon Cancer No family hx of          Current Outpatient Medications   Medication Sig Dispense Refill     albuterol (PROAIR HFA/PROVENTIL HFA/VENTOLIN HFA) 108 (90 Base) MCG/ACT inhaler Inhale 2 puffs into the lungs every 6 hours 1 Inhaler 0     ALEVE OR aleve arthritis prn       aspirin 81 MG tablet Take 81 mg by mouth daily       CALCIUM 600 + D 600-200 MG-UNIT OR TABS 1 tid  0     eszopiclone (LUNESTA) 1 MG tablet 1 tab by mouth 2-3 times per week as needed for  insomnia 14 tablet 0     fish oil-omega-3 fatty acids (FISH OIL) 1000 MG capsule Take 2 g by mouth daily.       furosemide (LASIX) 20 MG tablet Take 1 tablet (20 mg) by mouth 2 times daily 180 tablet 1     ibuprofen (ADVIL,MOTRIN) 200 MG tablet Take 2 tablets by mouth every 8 hours as needed for pain. 1 tablet 0     lisinopril (PRINIVIL/ZESTRIL) 10 MG tablet Take 1.5 tablets (15 mg) by mouth daily 135 tablet 1     omeprazole (PRILOSEC) 20 MG DR capsule Take 1 capsule (20 mg) by mouth daily 90 capsule 1     rosuvastatin (CRESTOR) 5 MG tablet Take 1 tablet (5 mg) by mouth daily 90 tablet 1     sennosides (SENOKOT) 8.6 MG tablet Take 2 tablets by mouth daily           Reviewed and updated as needed this visit by Provider         Review of Systems   ROS COMP: CONSTITUTIONAL: NEGATIVE for fever, chills, change in weight  EYES: NEGATIVE for vision changes or irritation  ENT/MOUTH: NEGATIVE for ear, mouth and throat problems  RESP: NEGATIVE for significant cough or SOB  CV: NEGATIVE for chest pain, palpitations or peripheral  "edema  MUSCULOSKELETAL: NEGATIVE for significant arthralgias or myalgia  NEURO: NEGATIVE for weakness, dizziness or paresthesias  PSYCHIATRIC: Stable insomnia      Objective    BP (!) 150/65 (BP Location: Left arm, Patient Position: Sitting, Cuff Size: Adult Regular)   Pulse 79   Temp 97.6  F (36.4  C) (Oral)   Resp 18   Ht 1.683 m (5' 6.25\")   Wt 82.8 kg (182 lb 9.6 oz)   SpO2 97%   BMI 29.25 kg/m   Blood pressure recheck twice after resting for 10 minutes and is 150/70  Body mass index is 29.25 kg/m .  Physical Exam   GENERAL: healthy, alert and no distress  EYES: Eyes grossly normal to inspection, PERRL and conjunctivae and sclerae normal  NECK: no adenopathy, no asymmetry, masses, or scars and thyroid normal to palpation  RESP: lungs clear to auscultation - no rales, rhonchi or wheezes  CV: regular rate and rhythm, normal S1 S2, no S3 or S4, no murmur, click or rub, no peripheral edema and peripheral pulses strong  MS: no gross musculoskeletal defects noted, no edema  NEURO: Normal strength and tone, mentation intact and speech normal             Assessment & Plan     (I10) Essential hypertension, benign  Comment: Elevated systolic blood pressure  Plan: We will increase lisinopril (PRINIVIL/ZESTRIL) 10 MG tablet to 1-1/2 tablet daily for a total of 15 mg.explained clearly about the medication,insructions and side effects.  Patient was advised to follow low-sodium diet and regular exercise and follow-up in clinic in 3 weeks  Check  Comprehensive metabolic panel            (G47.00) Insomnia, unspecified type  Comment: Stable  Plan: eszopiclone (LUNESTA) 1 MG tablet refilled as directed.explained clearly about the medication,insructions and side effects.Advised not to drive or operate any machinery while on this med     (R60.9) Edema, unspecified type  Comment: Stable  Plan: furosemide (LASIX) 20 MG tablet refilled as directed.explained clearly about the medication,insructions and side effects.     (E78.5) " "Hyperlipidemia LDL goal <130  Plan: rosuvastatin (CRESTOR) 5 MG tablet refilled.explained clearly about the medication,insructions and side effects.             Flu vaccine administered today     BMI:   Estimated body mass index is 29.25 kg/m  as calculated from the following:    Height as of this encounter: 1.683 m (5' 6.25\").    Weight as of this encounter: 82.8 kg (182 lb 9.6 oz).           FUTURE APPOINTMENTS:       - Follow-up visit in 3 wks        Adri Malcolm MD  [facility]        "

## 2019-10-31 LAB
ALBUMIN SERPL-MCNC: 4.2 G/DL (ref 3.4–5)
ALP SERPL-CCNC: 77 U/L (ref 40–150)
ALT SERPL W P-5'-P-CCNC: 22 U/L (ref 0–50)
ANION GAP SERPL CALCULATED.3IONS-SCNC: 7 MMOL/L (ref 3–14)
AST SERPL W P-5'-P-CCNC: 15 U/L (ref 0–45)
BILIRUB SERPL-MCNC: 1 MG/DL (ref 0.2–1.3)
BUN SERPL-MCNC: 17 MG/DL (ref 7–30)
CALCIUM SERPL-MCNC: 9.5 MG/DL (ref 8.5–10.1)
CHLORIDE SERPL-SCNC: 106 MMOL/L (ref 94–109)
CO2 SERPL-SCNC: 26 MMOL/L (ref 20–32)
CREAT SERPL-MCNC: 1.04 MG/DL (ref 0.52–1.04)
GFR SERPL CREATININE-BSD FRML MDRD: 51 ML/MIN/{1.73_M2}
GLUCOSE SERPL-MCNC: 107 MG/DL (ref 70–99)
POTASSIUM SERPL-SCNC: 4.6 MMOL/L (ref 3.4–5.3)
PROT SERPL-MCNC: 7.6 G/DL (ref 6.8–8.8)
SODIUM SERPL-SCNC: 139 MMOL/L (ref 133–144)

## 2019-11-25 ENCOUNTER — OFFICE VISIT (OUTPATIENT)
Dept: INTERNAL MEDICINE | Facility: CLINIC | Age: 79
End: 2019-11-25
Payer: MEDICARE

## 2019-11-25 VITALS
BODY MASS INDEX: 29.09 KG/M2 | WEIGHT: 181 LBS | OXYGEN SATURATION: 94 % | RESPIRATION RATE: 12 BRPM | TEMPERATURE: 97.7 F | HEIGHT: 66 IN | DIASTOLIC BLOOD PRESSURE: 80 MMHG | HEART RATE: 72 BPM | SYSTOLIC BLOOD PRESSURE: 158 MMHG

## 2019-11-25 DIAGNOSIS — I10 ESSENTIAL HYPERTENSION, BENIGN: Primary | ICD-10-CM

## 2019-11-25 PROCEDURE — 99214 OFFICE O/P EST MOD 30 MIN: CPT | Performed by: INTERNAL MEDICINE

## 2019-11-25 RX ORDER — LISINOPRIL 20 MG/1
20 TABLET ORAL DAILY
Qty: 90 TABLET | Refills: 1 | Status: SHIPPED | OUTPATIENT
Start: 2019-11-25 | End: 2020-04-29

## 2019-11-25 ASSESSMENT — MIFFLIN-ST. JEOR: SCORE: 1312.76

## 2019-11-25 NOTE — PROGRESS NOTES
Subjective     Paris Hull is a 79 year old female who presents to clinic today for the following health issues:    HPI      Hypertension Follow-up      Do you check your blood pressure regularly outside of the clinic? Yes patient is on lisinopril was increased to 15 mg at last office visit, tolerating well but the blood pressures are still high    Are you following a low salt diet? Yes    Are your blood pressures ever more than 140 on the top number (systolic) OR more   than 90 on the bottom number (diastolic), for example 140/90? [id number]      How many servings of fruits and vegetables do you eat daily?  2-3    On average, how many sweetened beverages do you drink each day (soda, juice, sweet tea, etc)?   0    How many days per week do you miss taking your medication? 0         Patient Active Problem List   Diagnosis     Disorder of bone and cartilage     Osteoporosis     HYPERLIPIDEMIA LDL GOAL <130     Central retinal vein occlusion     Essential hypertension, benign     Edema, unspecified type     Insomnia, unspecified type     Past Surgical History:   Procedure Laterality Date     COLONOSCOPY       COLONOSCOPY N/A 2015    Procedure: COLONOSCOPY;  Surgeon: Santosh Liao MD;  Location:  GI     HC REMOVAL OF TONSILS,<11 Y/O         Social History     Tobacco Use     Smoking status: Former Smoker     Packs/day: 0.50     Types: Cigarettes     Smokeless tobacco: Former User     Quit date: 2012     Tobacco comment: Has smoked for over 30 yrs.   Substance Use Topics     Alcohol use: No     Family History   Problem Relation Age of Onset     Cardiovascular Mother         a-fib/living age 87     Gastrointestinal Disease Mother         diverticulosis     Heart Disease Father          age 80-CVA     Cardiovascular Father      Neurologic Disorder Father         parkinsons     Family History Negative Sister         age 59     Family History Negative Brother         age 67     Colon Cancer  "No family hx of          Current Outpatient Medications   Medication Sig Dispense Refill     albuterol (PROAIR HFA/PROVENTIL HFA/VENTOLIN HFA) 108 (90 Base) MCG/ACT inhaler Inhale 2 puffs into the lungs every 6 hours 1 Inhaler 0     ALEVE OR aleve arthritis prn       aspirin 81 MG tablet Take 81 mg by mouth daily       CALCIUM 600 + D 600-200 MG-UNIT OR TABS 1 tid  0     eszopiclone (LUNESTA) 1 MG tablet 1 tab by mouth 2-3 times per week as needed for  insomnia 14 tablet 0     fish oil-omega-3 fatty acids (FISH OIL) 1000 MG capsule Take 2 g by mouth daily.       furosemide (LASIX) 20 MG tablet Take 1 tablet (20 mg) by mouth 2 times daily 180 tablet 1     ibuprofen (ADVIL,MOTRIN) 200 MG tablet Take 2 tablets by mouth every 8 hours as needed for pain. 1 tablet 0     omeprazole (PRILOSEC) 20 MG DR capsule Take 1 capsule (20 mg) by mouth daily 90 capsule 1     rosuvastatin (CRESTOR) 5 MG tablet Take 1 tablet (5 mg) by mouth daily 90 tablet 1     sennosides (SENOKOT) 8.6 MG tablet Take 2 tablets by mouth daily         Reviewed and updated as needed this visit by Provider         Review of Systems   ROS COMP: CONSTITUTIONAL: NEGATIVE for fever, chills, change in weight  EYES: NEGATIVE for vision changes or irritation  RESP: NEGATIVE for significant cough or SOB  CV: NEGATIVE for chest pain, palpitations or peripheral edema  MUSCULOSKELETAL: NEGATIVE for significant arthralgias or myalgia  NEURO: NEGATIVE for weakness, dizziness or paresthesias      Objective    BP (!) 158/80   Pulse 72   Temp 97.7  F (36.5  C) (Oral)   Resp 12   Ht 1.676 m (5' 6\")   Wt 82.1 kg (181 lb)   SpO2 94%   Breastfeeding No   BMI 29.21 kg/m    Body mass index is 29.21 kg/m .  Physical Exam   GENERAL: healthy, alert and no distress  RESP: lungs clear to auscultation - no rales, rhonchi or wheezes  CV: regular rate and rhythm,    MS: trace edema bilateral ankles , no calf tenderness  NEURO: Normal strength and tone, mentation intact and " "speech normal          Assessment & Plan     (I10) Essential hypertension, benign  (primary encounter diagnosis)  Comment: BP still hgh  Plan:increase  lisinopril (PRINIVIL/ZESTRIL) to  20 MG tablet daily as directed.explained clearly about the medication,insructions and side effects. Advised to follow low salt diet and exercise and f/u in 3 wks.          BMI:   Estimated body mass index is 29.21 kg/m  as calculated from the following:    Height as of this encounter: 1.676 m (5' 6\").    Weight as of this encounter: 82.1 kg (181 lb).   Weight management plan: Discussed healthy diet and exercise guidelines        FUTURE APPOINTMENTS:       - Follow-up visit in 3 wks         Adri Malcolm MD  [facility]    "

## 2019-11-25 NOTE — NURSING NOTE
"/80   Pulse 72   Temp 97.7  F (36.5  C) (Oral)   Resp 12   Ht 1.676 m (5' 6\")   Wt 82.1 kg (181 lb)   SpO2 94%   Breastfeeding No   BMI 29.21 kg/m      "

## 2019-12-11 ENCOUNTER — OFFICE VISIT (OUTPATIENT)
Dept: INTERNAL MEDICINE | Facility: CLINIC | Age: 79
End: 2019-12-11
Payer: MEDICARE

## 2019-12-11 VITALS
WEIGHT: 186.2 LBS | BODY MASS INDEX: 29.92 KG/M2 | OXYGEN SATURATION: 100 % | RESPIRATION RATE: 16 BRPM | HEIGHT: 66 IN | SYSTOLIC BLOOD PRESSURE: 142 MMHG | TEMPERATURE: 97.5 F | DIASTOLIC BLOOD PRESSURE: 70 MMHG | HEART RATE: 72 BPM

## 2019-12-11 DIAGNOSIS — I10 ESSENTIAL HYPERTENSION, BENIGN: Primary | ICD-10-CM

## 2019-12-11 PROCEDURE — 99213 OFFICE O/P EST LOW 20 MIN: CPT | Performed by: INTERNAL MEDICINE

## 2019-12-11 ASSESSMENT — MIFFLIN-ST. JEOR: SCORE: 1336.35

## 2019-12-11 NOTE — NURSING NOTE
"BP (!) 142/70   Pulse 72   Temp 97.5  F (36.4  C) (Oral)   Resp 16   Ht 1.676 m (5' 6\")   Wt 84.5 kg (186 lb 3.2 oz)   SpO2 100%   BMI 30.05 kg/m    Patient in for BP medication follow up.  Annelise Gibson CMA    "

## 2019-12-11 NOTE — PROGRESS NOTES
Subjective     Paris Hull is a 79 year old female who presents to clinic today for the following health issues:    HPI      Hypertension Follow-up      Do you check your blood pressure regularly outside of the clinic? Yes , lisinopril was increased to 20 mg at last office visit, tolerating well, also on Lasix but patient did not take it today.    Are you following a low salt diet? Yes    Are your blood pressures ever more than 140 on the top number (systolic) OR more   than 90 on the bottom number (diastolic), for example 140/90? Yes      Patient Active Problem List   Diagnosis     Disorder of bone and cartilage     Osteoporosis     HYPERLIPIDEMIA LDL GOAL <130     Central retinal vein occlusion     Essential hypertension, benign     Edema, unspecified type     Insomnia, unspecified type     Past Surgical History:   Procedure Laterality Date     COLONOSCOPY       COLONOSCOPY N/A 2015    Procedure: COLONOSCOPY;  Surgeon: Santosh Liao MD;  Location:  GI     HC REMOVAL OF TONSILS,<13 Y/O         Social History     Tobacco Use     Smoking status: Former Smoker     Packs/day: 0.50     Types: Cigarettes     Smokeless tobacco: Former User     Quit date: 2012     Tobacco comment: Has smoked for over 30 yrs.   Substance Use Topics     Alcohol use: No     Family History   Problem Relation Age of Onset     Cardiovascular Mother         a-fib/living age 87     Gastrointestinal Disease Mother         diverticulosis     Heart Disease Father          age 80-CVA     Cardiovascular Father      Neurologic Disorder Father         parkinsons     Family History Negative Sister         age 59     Family History Negative Brother         age 67     Colon Cancer No family hx of          Current Outpatient Medications   Medication Sig Dispense Refill     albuterol (PROAIR HFA/PROVENTIL HFA/VENTOLIN HFA) 108 (90 Base) MCG/ACT inhaler Inhale 2 puffs into the lungs every 6 hours 1 Inhaler 0     ALEVE OR aleve  "arthritis prn       aspirin 81 MG tablet Take 81 mg by mouth daily       CALCIUM 600 + D 600-200 MG-UNIT OR TABS 1 tid  0     eszopiclone (LUNESTA) 1 MG tablet 1 tab by mouth 2-3 times per week as needed for  insomnia 14 tablet 0     fish oil-omega-3 fatty acids (FISH OIL) 1000 MG capsule Take 2 g by mouth daily.       furosemide (LASIX) 20 MG tablet Take 1 tablet (20 mg) by mouth 2 times daily 180 tablet 1     ibuprofen (ADVIL,MOTRIN) 200 MG tablet Take 2 tablets by mouth every 8 hours as needed for pain. 1 tablet 0     lisinopril (PRINIVIL/ZESTRIL) 20 MG tablet Take 1 tablet (20 mg) by mouth daily 90 tablet 1     omeprazole (PRILOSEC) 20 MG DR capsule Take 1 capsule (20 mg) by mouth daily 90 capsule 1     rosuvastatin (CRESTOR) 5 MG tablet Take 1 tablet (5 mg) by mouth daily 90 tablet 1     sennosides (SENOKOT) 8.6 MG tablet Take 2 tablets by mouth daily           Reviewed and updated as needed this visit by Provider         Review of Systems   ROS COMP: CONSTITUTIONAL: NEGATIVE for fever, chills, change in weight  RESP: NEGATIVE for significant cough or SOB  CV: NEGATIVE for chest pain, palpitations or peripheral edema  NEURO: NEGATIVE for weakness, dizziness or paresthesias        Objective    BP (!) 142/70   Pulse 72   Temp 97.5  F (36.4  C) (Oral)   Resp 16   Ht 1.676 m (5' 6\")   Wt 84.5 kg (186 lb 3.2 oz)   SpO2 100%   BMI 30.05 kg/m    Body mass index is 30.05 kg/m .  Physical Exam   GENERAL: healthy, alert and no distress  RESP: lungs clear to auscultation - no rales, rhonchi or wheezes  CV: regular rate and rhythm,   MS: trace ankle edema, no calf tendenress  NEURO: Normal strength and tone, mentation intact and speech normal       Assessment & Plan      (I10) Essential hypertension, benign  (primary encounter diagnosis)  Plan: BP has improved since before, continue lisinopril 20 mg daily and lasix as directed.Advised to follow low salt diet and exercise and f/u in 6 mths.         FUTURE " APPOINTMENTS:       - Follow-up visit in 04/20         Adri Malcolm MD  [facility]

## 2020-02-08 ENCOUNTER — HEALTH MAINTENANCE LETTER (OUTPATIENT)
Age: 80
End: 2020-02-08

## 2020-04-29 ENCOUNTER — VIRTUAL VISIT (OUTPATIENT)
Dept: INTERNAL MEDICINE | Facility: CLINIC | Age: 80
End: 2020-04-29
Payer: MEDICARE

## 2020-04-29 DIAGNOSIS — I10 ESSENTIAL HYPERTENSION, BENIGN: ICD-10-CM

## 2020-04-29 DIAGNOSIS — K21.9 GASTROESOPHAGEAL REFLUX DISEASE, ESOPHAGITIS PRESENCE NOT SPECIFIED: ICD-10-CM

## 2020-04-29 DIAGNOSIS — R60.9 EDEMA, UNSPECIFIED TYPE: ICD-10-CM

## 2020-04-29 DIAGNOSIS — E78.5 HYPERLIPIDEMIA LDL GOAL <130: ICD-10-CM

## 2020-04-29 DIAGNOSIS — G47.00 INSOMNIA, UNSPECIFIED TYPE: ICD-10-CM

## 2020-04-29 PROCEDURE — 99441 ZZC PHYSICIAN TELEPHONE EVALUATION 5-10 MIN: CPT | Performed by: INTERNAL MEDICINE

## 2020-04-29 RX ORDER — LISINOPRIL 20 MG/1
20 TABLET ORAL DAILY
Qty: 90 TABLET | Refills: 1 | Status: SHIPPED | OUTPATIENT
Start: 2020-04-29 | End: 2020-10-07 | Stop reason: DRUGHIGH

## 2020-04-29 RX ORDER — FUROSEMIDE 20 MG
20 TABLET ORAL 2 TIMES DAILY
Qty: 180 TABLET | Refills: 1 | Status: SHIPPED | OUTPATIENT
Start: 2020-04-29 | End: 2020-10-07

## 2020-04-29 RX ORDER — ROSUVASTATIN CALCIUM 5 MG/1
5 TABLET, COATED ORAL DAILY
Qty: 90 TABLET | Refills: 1 | Status: SHIPPED | OUTPATIENT
Start: 2020-04-29 | End: 2020-10-07

## 2020-04-29 RX ORDER — ESZOPICLONE 1 MG/1
TABLET, FILM COATED ORAL
Qty: 14 TABLET | Refills: 0 | Status: SHIPPED | OUTPATIENT
Start: 2020-04-29 | End: 2020-10-07

## 2020-04-29 NOTE — PROGRESS NOTES
"Parsi Hull is a 80 year old female who is being evaluated via a billable telephone visit.      The patient has been notified of following:     \"This telephone visit will be conducted via a call between you and your physician/provider. We have found that certain health care needs can be provided without the need for a physical exam.  This service lets us provide the care you need with a short phone conversation.  If a prescription is necessary we can send it directly to your pharmacy.  If lab work is needed we can place an order for that and you can then stop by our lab to have the test done at a later time.    Telephone visits are billed at different rates depending on your insurance coverage. During this emergency period, for some insurers they may be billed the same as an in-person visit.  Please reach out to your insurance provider with any questions.    If during the course of the call the physician/provider feels a telephone visit is not appropriate, you will not be charged for this service.\"    Patient has given verbal consent for Telephone visit?  Yes    How would you like to obtain your AVS? Mail a copy    Subjective     Paris Hull is a 80 year old female who presents for phone visit  today for the following health issues:    HPI     Hyperlipidemia Follow-Up      Are you regularly taking any medication or supplement to lower your cholesterol?   Yes- statin    Are you having muscle aches or other side effects that you think could be caused by your cholesterol lowering medication?  No    Hypertension Follow-up      Do you check your blood pressure regularly outside of the clinic? Yes BP - 130/76    Are you following a low salt diet? Yes    Are your blood pressures ever more than 140 on the top number (systolic) OR more   than 90 on the bottom number (diastolic), for example 140/90? No      Insomnia ; on lunesta occasionally and requesting refills.         How many servings of fruits and vegetables " do you eat daily?  2-3    On average, how many sweetened beverages do you drink each day (Examples: soda, juice, sweet tea, etc.  Do NOT count diet or artificially sweetened beverages)?   1    How many days per week do you exercise enough to make your heart beat faster? 3 or less    How many minutes a day do you exercise enough to make your heart beat faster? 9 or less    How many days per week do you miss taking your medication? 0         Patient Active Problem List   Diagnosis     Disorder of bone and cartilage     Osteoporosis     HYPERLIPIDEMIA LDL GOAL <130     Central retinal vein occlusion     Essential hypertension, benign     Edema, unspecified type     Insomnia, unspecified type     Past Surgical History:   Procedure Laterality Date     COLONOSCOPY       COLONOSCOPY N/A 2015    Procedure: COLONOSCOPY;  Surgeon: Santosh Liao MD;  Location: [facility]     HC REMOVAL OF TONSILS,<11 Y/O         Social History     Tobacco Use     Smoking status: Former Smoker     Packs/day: 0.50     Types: Cigarettes     Smokeless tobacco: Former User     Quit date: 2012     Tobacco comment: Has smoked for over 30 yrs.   Substance Use Topics     Alcohol use: No     Family History   Problem Relation Age of Onset     Cardiovascular Mother         a-fib/living age 87     Gastrointestinal Disease Mother         diverticulosis     Heart Disease Father          age 80-CVA     Cardiovascular Father      Neurologic Disorder Father         parkinsons     Family History Negative Sister         age 59     Family History Negative Brother         age 67     Colon Cancer No family hx of          Current Outpatient Medications   Medication Sig Dispense Refill     aspirin 81 MG tablet Take 81 mg by mouth daily       CALCIUM 600 + D 600-200 MG-UNIT OR TABS 1 tid  0     eszopiclone (LUNESTA) 1 MG tablet 1 tab by mouth 2-3 times per week as needed for  insomnia 14 tablet 0     fish oil-omega-3 fatty acids (FISH OIL) 1000 MG  capsule Take 2 g by mouth daily.       furosemide (LASIX) 20 MG tablet Take 1 tablet (20 mg) by mouth 2 times daily 180 tablet 1     lisinopril (PRINIVIL/ZESTRIL) 20 MG tablet Take 1 tablet (20 mg) by mouth daily 90 tablet 1     omeprazole (PRILOSEC) 20 MG DR capsule Take 1 capsule (20 mg) by mouth daily 90 capsule 1     rosuvastatin (CRESTOR) 5 MG tablet Take 1 tablet (5 mg) by mouth daily 90 tablet 1     sennosides (SENOKOT) 8.6 MG tablet Take 2 tablets by mouth daily       albuterol (PROAIR HFA/PROVENTIL HFA/VENTOLIN HFA) 108 (90 Base) MCG/ACT inhaler Inhale 2 puffs into the lungs every 6 hours (Patient not taking: Reported on 4/29/2020) 1 Inhaler 0       Reviewed and updated as needed this visit by Provider         Review of Systems   ROS COMP: CONSTITUTIONAL: NEGATIVE for fever, chills, change in weight  EYES: NEGATIVE for vision changes or irritation  ENT/MOUTH: NEGATIVE for ear, mouth and throat problems  RESP: NEGATIVE for significant cough or SOB  CV: NEGATIVE for chest pain, palpitations or peripheral edema  MUSCULOSKELETAL: NEGATIVE for significant arthralgias or myalgia  NEURO: NEGATIVE for weakness, dizziness or paresthesias  PSYCHIATRIC: NEGATIVE for changes in mood or affect       Objective   Reported vitals:  There were no vitals taken for this visit.   healthy, alert and no distress  PSYCH: Alert and oriented times 3; coherent speech, normal   rate and volume, able to articulate logical thoughts, able   to abstract reason, no tangential thoughts, no hallucinations   or delusions  Her affect is normal  RESP: No cough, no audible wheezing, able to talk in full sentences  Remainder of exam unable to be completed due to telephone visits           Assessment/Plan:  1. Insomnia, unspecified type  - eszopiclone (LUNESTA) 1 MG tablet; 1 tab by mouth 2-3 times per week as needed for  insomnia  Dispense: 14 tablet; Refill: 0.explained clearly about the medication,insructions and side effects. Advised not to  drive or operate any machinery while on this med      2. Edema, unspecified type  - furosemide (LASIX) 20 MG tablet; Take 1 tablet (20 mg) by mouth 2 times daily  Dispense: 180 tablet; Refill: 1    3. Essential hypertension, benign  - lisinopril (ZESTRIL) 20 MG tablet; Take 1 tablet (20 mg) by mouth daily  Dispense: 90 tablet; Refill: 1    4. Hyperlipidemia LDL goal <130  - rosuvastatin (CRESTOR) 5 MG tablet; Take 1 tablet (5 mg) by mouth daily  Dispense: 90 tablet; Refill: 1    5. Gastroesophageal reflux disease, esophagitis presence not specified  - omeprazole (PRILOSEC) 20 MG DR capsule; Take 1 capsule (20 mg) by mouth daily  Dispense: 90 capsule; Refill: 1      Return in about 3 months (around 7/29/2020).      Phone call duration: 10  minutes    Adri Malcolm MD

## 2020-07-29 ENCOUNTER — HOSPITAL ENCOUNTER (OUTPATIENT)
Dept: MAMMOGRAPHY | Facility: CLINIC | Age: 80
Discharge: HOME | End: 2020-07-29
Attending: INTERNAL MEDICINE | Admitting: INTERNAL MEDICINE
Payer: MEDICARE

## 2020-07-29 DIAGNOSIS — Z12.31 VISIT FOR SCREENING MAMMOGRAM: ICD-10-CM

## 2020-07-29 PROCEDURE — 77063 BREAST TOMOSYNTHESIS BI: CPT

## 2020-10-07 ENCOUNTER — OFFICE VISIT (OUTPATIENT)
Dept: INTERNAL MEDICINE | Facility: CLINIC | Age: 80
End: 2020-10-07
Payer: MEDICARE

## 2020-10-07 VITALS
OXYGEN SATURATION: 97 % | SYSTOLIC BLOOD PRESSURE: 154 MMHG | HEART RATE: 83 BPM | BODY MASS INDEX: 29.94 KG/M2 | HEIGHT: 66 IN | WEIGHT: 186.3 LBS | TEMPERATURE: 98.3 F | RESPIRATION RATE: 18 BRPM | DIASTOLIC BLOOD PRESSURE: 80 MMHG

## 2020-10-07 DIAGNOSIS — R60.9 EDEMA, UNSPECIFIED TYPE: ICD-10-CM

## 2020-10-07 DIAGNOSIS — E78.5 HYPERLIPIDEMIA LDL GOAL <130: ICD-10-CM

## 2020-10-07 DIAGNOSIS — G47.00 INSOMNIA, UNSPECIFIED TYPE: ICD-10-CM

## 2020-10-07 DIAGNOSIS — N18.31 STAGE 3A CHRONIC KIDNEY DISEASE (H): ICD-10-CM

## 2020-10-07 DIAGNOSIS — Z23 NEED FOR PROPHYLACTIC VACCINATION AND INOCULATION AGAINST INFLUENZA: Primary | ICD-10-CM

## 2020-10-07 DIAGNOSIS — I10 ESSENTIAL HYPERTENSION, BENIGN: ICD-10-CM

## 2020-10-07 PROBLEM — N18.30 CKD (CHRONIC KIDNEY DISEASE) STAGE 3, GFR 30-59 ML/MIN (H): Status: ACTIVE | Noted: 2020-10-07

## 2020-10-07 PROCEDURE — 90662 IIV NO PRSV INCREASED AG IM: CPT | Performed by: INTERNAL MEDICINE

## 2020-10-07 PROCEDURE — G0008 ADMIN INFLUENZA VIRUS VAC: HCPCS | Performed by: INTERNAL MEDICINE

## 2020-10-07 PROCEDURE — 80053 COMPREHEN METABOLIC PANEL: CPT | Performed by: INTERNAL MEDICINE

## 2020-10-07 PROCEDURE — 99214 OFFICE O/P EST MOD 30 MIN: CPT | Performed by: INTERNAL MEDICINE

## 2020-10-07 PROCEDURE — 36415 COLL VENOUS BLD VENIPUNCTURE: CPT | Performed by: INTERNAL MEDICINE

## 2020-10-07 PROCEDURE — 80061 LIPID PANEL: CPT | Performed by: INTERNAL MEDICINE

## 2020-10-07 RX ORDER — ESZOPICLONE 1 MG/1
TABLET, FILM COATED ORAL
Qty: 14 TABLET | Refills: 0 | Status: SHIPPED | OUTPATIENT
Start: 2020-10-07 | End: 2021-04-06

## 2020-10-07 RX ORDER — FUROSEMIDE 20 MG
20 TABLET ORAL 2 TIMES DAILY
Qty: 180 TABLET | Refills: 1 | Status: SHIPPED | OUTPATIENT
Start: 2020-10-07 | End: 2021-04-06

## 2020-10-07 RX ORDER — ROSUVASTATIN CALCIUM 5 MG/1
5 TABLET, COATED ORAL DAILY
Qty: 90 TABLET | Refills: 1 | Status: SHIPPED | OUTPATIENT
Start: 2020-10-07 | End: 2021-04-06

## 2020-10-07 RX ORDER — LISINOPRIL 30 MG/1
30 TABLET ORAL DAILY
Qty: 90 TABLET | Refills: 0 | Status: SHIPPED | OUTPATIENT
Start: 2020-10-07 | End: 2020-12-11

## 2020-10-07 ASSESSMENT — MIFFLIN-ST. JEOR: SCORE: 1331.8

## 2020-10-07 NOTE — PROGRESS NOTES
Subjective     Paris Hull is a 80 year old female who presents to clinic today for the following health issues:    HPI         Hyperlipidemia Follow-Up      Are you regularly taking any medication or supplement to lower your cholesterol?   Yes- crestor     Are you having muscle aches or other side effects that you think could be caused by your cholesterol lowering medication?  No     Hypertension Follow-up      Do you check your blood pressure regularly outside of the clinic? Yes 128/80    Are you following a low salt diet? Yes tries    Are your blood pressures ever more than 140 on the top number (systolic) OR more   than 90 on the bottom number (diastolic), for example 140/90? Yes      Insomnia follow-up: On Lunesta occasionally, takes 2-3 times a week and requesting refills.      How many servings of fruits and vegetables do you eat daily?  2-3    On average, how many sweetened beverages do you drink each day (Examples: soda, juice, sweet tea, etc.  Do NOT count diet or artificially sweetened beverages)?   0    How many days per week do you exercise enough to make your heart beat faster? 7    How many minutes a day do you exercise enough to make your heart beat faster? 20 - 29  How many days per week do you miss taking your medication? 0    What makes it hard for you to take your medications?  none    Past Medical History:   Diagnosis Date     Essential hypertension, benign      Mixed hyperlipidemia      Osteoporosis, unspecified      Other seborrheic keratosis     sees DERM     Tobacco use disorder     quit smoking in 09/12       Current Outpatient Medications   Medication Sig Dispense Refill     aspirin 81 MG tablet Take 81 mg by mouth daily       CALCIUM 600 + D 600-200 MG-UNIT OR TABS 1 tid  0     eszopiclone (LUNESTA) 1 MG tablet 1 tab by mouth 2-3 times per week as needed for  insomnia 14 tablet 0     fish oil-omega-3 fatty acids (FISH OIL) 1000 MG capsule Take 2 g by mouth daily.       furosemide  "(LASIX) 20 MG tablet Take 1 tablet (20 mg) by mouth 2 times daily 180 tablet 1     lisinopril (ZESTRIL) 30 MG tablet Take 1 tablet (30 mg) by mouth daily 90 tablet 0     omeprazole (PRILOSEC) 20 MG DR capsule Take 1 capsule (20 mg) by mouth daily 90 capsule 1     rosuvastatin (CRESTOR) 5 MG tablet Take 1 tablet (5 mg) by mouth daily 90 tablet 1     sennosides (SENOKOT) 8.6 MG tablet Take 2 tablets by mouth daily           Review of Systems   CONSTITUTIONAL: NEGATIVE for fever, chills, change in weight  EYES: NEGATIVE for vision changes or irritation  ENT/MOUTH: NEGATIVE for ear, mouth and throat problems  RESP: NEGATIVE for significant cough or SOB  CV: NEGATIVE for chest pain, palpitations or peripheral edema  MUSCULOSKELETAL: NEGATIVE for significant arthralgias or myalgia  NEURO: NEGATIVE for weakness, dizziness or paresthesias  PSYCHIATRIC: Insomnia stable      Objective    BP (!) 154/80 (BP Location: Left arm, Patient Position: Sitting, Cuff Size: Adult Regular)   Pulse 83   Temp 98.3  F (36.8  C) (Oral)   Resp 18   Ht 1.676 m (5' 6\")   Wt 84.5 kg (186 lb 4.8 oz)   SpO2 97%   BMI 30.07 kg/m    Body mass index is 30.07 kg/m .  Physical Exam   GENERAL: healthy, alert and no distress  EYES: Eyes grossly normal to inspection, PERRL and conjunctivae and sclerae normal  NECK: no adenopathy, no asymmetry, masses, or scars and thyroid normal to palpation  RESP: lungs clear to auscultation - no rales, rhonchi or wheezes  CV: regular rate and rhythm,    MS: trace edema, no calf tenderness  NEURO: Normal strength and tone, mentation intact and speech normal             Assessment & Plan       (I10) Essential hypertension, benign  Comment: Systolic blood pressure elevated  Plan: Increase lisinopril (ZESTRIL) to 30 MG tablet 1 tablet daily as directed.explained clearly about the medication,insructions and side effects.  Advised to follow low salt diet and exercise and f/u in 1 month           (E78.5) Hyperlipidemia " "LDL goal <130  Plan: Lipid panel reflex to direct LDL Fasting,         rosuvastatin (CRESTOR) 5 MG tablet.explained clearly about the medication,insructions and side effects.            (G47.00) Insomnia, unspecified type  Plan: eszopiclone (LUNESTA) 1 MG tablet refilled as directed.explained clearly about the medication,insructions and side effects. Advised not to drive or operate any machinery while on this med            (R60.9) Edema, unspecified type  Plan: furosemide (LASIX) 20 MG tablet refilled.explained clearly about the medication,insructions and side effects.            (N18.31) Stage 3a chronic kidney disease  Plan: Monitor creatinine, avoid nephrotoxins    (Z23) Need for prophylactic vaccination and inoculation against influenza    Plan: FLUZONE HIGH DOSE 65+  [41912]              BMI:   Estimated body mass index is 30.07 kg/m  as calculated from the following:    Height as of this encounter: 1.676 m (5' 6\").    Weight as of this encounter: 84.5 kg (186 lb 4.8 oz).   Weight management plan: Discussed healthy diet and exercise guidelines         Return in about 4 weeks (around 11/4/2020).    Adri Malcolm MD  [facility]    "

## 2020-10-08 LAB
ALBUMIN SERPL-MCNC: 3.8 G/DL (ref 3.4–5)
ALP SERPL-CCNC: 65 U/L (ref 40–150)
ALT SERPL W P-5'-P-CCNC: 24 U/L (ref 0–50)
ANION GAP SERPL CALCULATED.3IONS-SCNC: 1 MMOL/L (ref 3–14)
AST SERPL W P-5'-P-CCNC: 15 U/L (ref 0–45)
BILIRUB SERPL-MCNC: 0.9 MG/DL (ref 0.2–1.3)
BUN SERPL-MCNC: 18 MG/DL (ref 7–30)
CALCIUM SERPL-MCNC: 9.3 MG/DL (ref 8.5–10.1)
CHLORIDE SERPL-SCNC: 108 MMOL/L (ref 94–109)
CHOLEST SERPL-MCNC: 175 MG/DL
CO2 SERPL-SCNC: 29 MMOL/L (ref 20–32)
CREAT SERPL-MCNC: 0.99 MG/DL (ref 0.52–1.04)
GFR SERPL CREATININE-BSD FRML MDRD: 54 ML/MIN/{1.73_M2}
GLUCOSE SERPL-MCNC: 106 MG/DL (ref 70–99)
HDLC SERPL-MCNC: 53 MG/DL
LDLC SERPL CALC-MCNC: 79 MG/DL
NONHDLC SERPL-MCNC: 122 MG/DL
POTASSIUM SERPL-SCNC: 4.7 MMOL/L (ref 3.4–5.3)
PROT SERPL-MCNC: 7.7 G/DL (ref 6.8–8.8)
SODIUM SERPL-SCNC: 138 MMOL/L (ref 133–144)
TRIGL SERPL-MCNC: 215 MG/DL

## 2020-12-11 ENCOUNTER — OFFICE VISIT (OUTPATIENT)
Dept: INTERNAL MEDICINE | Facility: CLINIC | Age: 80
End: 2020-12-11
Payer: MEDICARE

## 2020-12-11 VITALS
WEIGHT: 185.8 LBS | DIASTOLIC BLOOD PRESSURE: 80 MMHG | SYSTOLIC BLOOD PRESSURE: 132 MMHG | HEART RATE: 92 BPM | RESPIRATION RATE: 17 BRPM | TEMPERATURE: 98.1 F | OXYGEN SATURATION: 96 % | BODY MASS INDEX: 29.86 KG/M2 | HEIGHT: 66 IN

## 2020-12-11 DIAGNOSIS — I10 ESSENTIAL HYPERTENSION, BENIGN: ICD-10-CM

## 2020-12-11 DIAGNOSIS — M75.82 ROTATOR CUFF TENDINITIS, LEFT: Primary | ICD-10-CM

## 2020-12-11 PROCEDURE — 99214 OFFICE O/P EST MOD 30 MIN: CPT | Performed by: INTERNAL MEDICINE

## 2020-12-11 RX ORDER — LISINOPRIL 30 MG/1
30 TABLET ORAL DAILY
Qty: 90 TABLET | Refills: 1 | Status: SHIPPED | OUTPATIENT
Start: 2020-12-11 | End: 2021-01-11

## 2020-12-11 ASSESSMENT — MIFFLIN-ST. JEOR: SCORE: 1329.53

## 2020-12-11 NOTE — PROGRESS NOTES
Subjective     Paris Hull is a 80 year old female who presents to clinic today for the following health issues:    HPI           Hypertension Follow-up      Do you check your blood pressure regularly outside of the clinic? Yes     Are you following a low salt diet? Yes    Are your blood pressures ever more than 140 on the top number (systolic) OR more   than 90 on the bottom number (diastolic), for example 140/90? Yes      Pt complains of  Lt  shoulder pain since 6 wks.  Pain described as dull aching.   Pain is worse with overhead movement, reaching behind back and sleeping at night.  Denies history of previous problems with this shoulder. No h/o injury , not taking any pain med.        How many servings of fruits and vegetables do you eat daily?  2-3    On average, how many sweetened beverages do you drink each day (Examples: soda, juice, sweet tea, etc.  Do NOT count diet or artificially sweetened beverages)?   1    How many days per week do you exercise enough to make your heart beat faster? 4    How many minutes a day do you exercise enough to make your heart beat faster? 30 - 60    How many days per week do you miss taking your medication? 0      Past Medical History:   Diagnosis Date     Essential hypertension, benign      Mixed hyperlipidemia      Osteoporosis, unspecified      Other seborrheic keratosis     sees DERM     Tobacco use disorder     quit smoking in 09/12       Current Outpatient Medications   Medication Sig Dispense Refill     aspirin 81 MG tablet Take 81 mg by mouth daily       CALCIUM 600 + D 600-200 MG-UNIT OR TABS 1 tid  0     eszopiclone (LUNESTA) 1 MG tablet 1 tab by mouth 2-3 times per week as needed for  insomnia 14 tablet 0     fish oil-omega-3 fatty acids (FISH OIL) 1000 MG capsule Take 2 g by mouth daily.       furosemide (LASIX) 20 MG tablet Take 1 tablet (20 mg) by mouth 2 times daily 180 tablet 1     lisinopril (ZESTRIL) 30 MG tablet Take 1 tablet (30 mg) by mouth daily 90  "tablet 0     omeprazole (PRILOSEC) 20 MG DR capsule Take 1 capsule (20 mg) by mouth daily 90 capsule 1     rosuvastatin (CRESTOR) 5 MG tablet Take 1 tablet (5 mg) by mouth daily 90 tablet 1     sennosides (SENOKOT) 8.6 MG tablet Take 2 tablets by mouth daily           Review of Systems   CONSTITUTIONAL: NEGATIVE for fever, chills, change in weight  RESP: NEGATIVE for significant cough or SOB  CV: NEGATIVE for chest pain, palpitations or peripheral edema  MUSCULOSKELETAL: NEGATIVE for significant arthralgias or myalgia          Objective    /80   Pulse 92   Temp 98.1  F (36.7  C) (Oral)   Resp 17   Ht 1.676 m (5' 6\")   Wt 84.3 kg (185 lb 12.8 oz)   SpO2 96%   BMI 29.99 kg/m    Body mass index is 29.99 kg/m .  Physical Exam   GENERAL: healthy, alert and no distress  RESP: lungs clear to auscultation - no rales, rhonchi or wheezes  CV: regular rate and rhythm, normal S1 S2, no S3 or S4, no murmur, click or rub, no peripheral edema and peripheral pulses strong  MS: Lt Shoulder: no anatomical abnormalities noted, pain at AC joint and pain with isolated resisted external rotation,pain also with abduction to less than 90 degrees,and reaching back.  Ext; no calf tenderness bilaterally           Assessment & Plan      (M75.82) Rotator cuff tendinitis, left   Plan:explained about the condition, cannot take NSAID's ( h/o HTN)  Advised OTC tylenol as directed, advised to avoid over head positioning,the patient is asked to apply heat for 10-15 minutes qid followed by  passive pendulum range of motion exercises.,Call or return to clinic prn if these symtoms worsen, fail to improve as anticipated, or if new symptoms develop.  Referred to  Orthopedic [facility] Sp[facility]  Referral            (I10) Essential hypertension, benign  Comment: BP controlled   Plan: continue  lisinopril (ZESTRIL) 30 MG tablet daily as directed.explained clearly about the medication,insructions and side effects.        Return in about 4 months " (around 4/11/2021) for BP Recheck.    Adri Malcolm MD  [facility]

## 2020-12-11 NOTE — NURSING NOTE
"/78   Pulse 116   Temp 98.1  F (36.7  C) (Oral)   Resp 17   Ht 1.676 m (5' 6\")   Wt 84.3 kg (185 lb 12.8 oz)   SpO2 96%   BMI 29.99 kg/m    Patient in for follow up on HTN and lipids.  Annelise Gibson, JOHN    "

## 2020-12-28 NOTE — PROGRESS NOTES
ASSESSMENT & PLAN    1. Chronic left shoulder pain    2. Adhesive capsulitis of left shoulder      Seen in consultation for chronic left shoulder pain without any known injury  Rotator cuff testing is strong and not concerned about rotator cuff tear  Pain is related to frozen shoulder  Steroid injection of the left shoulder: intra-articular  was performed today in clinic which hopefully will help with pain but will not change your range of motion  Referral to physical therapy since this is the treatment and what will change your range of motion over time. It can take months to improve your range related to frozen shoulder. Given a hard copy for your to take and use closer to your home    -----    SUBJECTIVE  Paris Hull is a/an 80 year old Right handed female who is seen in consultation at the request of  Adri Malcolm M.D. for evaluation of left shoulder pain. The patient is seen by themselves.     Onset: 10 week(s) ago. Reports insidious onset without acute precipitating event. Possibly leaning on arm while sitting in chair watching TV and doing crossword puzzles. Saw PCP 12/11/20 who referred her here for possible injection.   Location of Pain: left lateral shoulder   Rating of Pain at worst: 10/10  Rating of Pain Currently: 2/10  Worsened by: overhead movements, abduction, reaching behind  Better with: Heat   Treatments tried: heat and Tylenol  Associated symptoms: no distal numbness or tingling; denies swelling or warmth  Orthopedic history: NO  Relevant surgical history: NO  Patient Social History: retired    Patient's past medical, surgical, social, and family histories were reviewed today and no changes are noted.    REVIEW OF SYSTEMS:  10 point ROS is negative other than symptoms noted above in HPI, Past Medical History or as stated below  Constitutional: NEGATIVE for fever, chills, change in weight  Skin: NEGATIVE for worrisome rashes, moles or lesions  GI/: NEGATIVE for bowel or bladder  "changes  Neuro: NEGATIVE for weakness, dizziness or paresthesias    OBJECTIVE:  /78   Ht 1.676 m (5' 6\")   BMI 29.99 kg/m     General: healthy, alert and in no distress  HEENT: no scleral icterus or conjunctival erythema  Skin: no suspicious lesions or rash. No jaundice.  CV: regular rhythm by palpation  Resp: normal respiratory effort without conversational dyspnea   Psych: normal mood and affect  Gait: normal steady gait with appropriate coordination and balance  Neuro: normal light touch sensory exam of the bilateral upper extremities.    MSK:  LEFT SHOULDER  Inspection:    no atrophy  Palpation:    Tender about the anterior capsule. Remainder of bony and tendinous landmarks are nontender.  Active Range of Motion:     Abduction 1050, FF 1050, , both passive and active  Strength:    Scapular plane abduction 5/5,  ER 5/5, IR 5/5    Independent visualization of the below image:  Xray Left shoulder  Well preserved GH and AC joints. Patient outlet. No fracture or acute osseous findings.    Large Joint Injection/Arthocentesis: L glenohumeral joint    Date/Time: 1/11/2021 12:40 PM  Performed by: Maury Lozano DO  Authorized by: Maury Lozano DO     Indications:  Pain  Needle Size:  22 G  Guidance: ultrasound    Approach:  Posterior  Location:  Shoulder      Site:  L glenohumeral joint  Medications:  40 mg methylPREDNISolone 40 MG/ML; 3 mL ropivacaine 5 MG/ML  Outcome:  Tolerated well, no immediate complications  Procedure discussed: discussed risks, benefits, and alternatives    Consent Given by:  Patient  Timeout: timeout called immediately prior to procedure    Prep: patient was prepped and draped in usual sterile fashion                   Maury Lozano DO [facility]    "

## 2020-12-29 ENCOUNTER — ANCILLARY PROCEDURE (OUTPATIENT)
Dept: GENERAL RADIOLOGY | Facility: CLINIC | Age: 80
End: 2020-12-29
Attending: FAMILY MEDICINE
Payer: MEDICARE

## 2020-12-29 ENCOUNTER — OFFICE VISIT (OUTPATIENT)
Dept: ORTHOPEDICS | Facility: CLINIC | Age: 80
End: 2020-12-29
Payer: MEDICARE

## 2020-12-29 VITALS — DIASTOLIC BLOOD PRESSURE: 78 MMHG | BODY MASS INDEX: 29.99 KG/M2 | SYSTOLIC BLOOD PRESSURE: 132 MMHG | HEIGHT: 66 IN

## 2020-12-29 DIAGNOSIS — M25.512 CHRONIC LEFT SHOULDER PAIN: Primary | ICD-10-CM

## 2020-12-29 DIAGNOSIS — M75.02 ADHESIVE CAPSULITIS OF LEFT SHOULDER: ICD-10-CM

## 2020-12-29 DIAGNOSIS — G89.29 CHRONIC LEFT SHOULDER PAIN: Primary | ICD-10-CM

## 2020-12-29 DIAGNOSIS — M25.512 LEFT SHOULDER PAIN: ICD-10-CM

## 2020-12-29 PROCEDURE — 99204 OFFICE O/P NEW MOD 45 MIN: CPT | Mod: 25 | Performed by: FAMILY MEDICINE

## 2020-12-29 PROCEDURE — 73030 X-RAY EXAM OF SHOULDER: CPT | Mod: LT | Performed by: RADIOLOGY

## 2020-12-29 PROCEDURE — 20611 DRAIN/INJ JOINT/BURSA W/US: CPT | Mod: LT | Performed by: FAMILY MEDICINE

## 2020-12-29 NOTE — LETTER
12/29/2020         RE: Paris Hull  [address]  [place] [address]        Dear Colleague,    Thank you for referring your patient, Paris Hull, to the [facility] [place]. Please see a copy of my visit note below.    ASSESSMENT & PLAN    1. Chronic left shoulder pain    2. Adhesive capsulitis of left shoulder      Seen in consultation for chronic left shoulder pain without any known injury  Rotator cuff testing is strong and not concerned about rotator cuff tear  Pain is related to frozen shoulder  Steroid injection of the left shoulder: intra-articular  was performed today in clinic which hopefully will help with pain but will not change your range of motion  Referral to physical therapy since this is the treatment and what will change your range of motion over time. It can take months to improve your range related to frozen shoulder. Given a hard copy for your to take and use closer to your home    -----    SUBJECTIVE  Paris Hull is a/an 80 year old Right handed female who is seen in consultation at the request of  Adri Malcolm M.D. for evaluation of left shoulder pain. The patient is seen by themselves.     Onset: 10 week(s) ago. Reports insidious onset without acute precipitating event. Possibly leaning on arm while sitting in chair watching TV and doing crossword puzzles. Saw PCP 12/11/20 who referred her here for possible injection.   Location of Pain: left lateral shoulder   Rating of Pain at worst: 10/10  Rating of Pain Currently: 2/10  Worsened by: overhead movements, abduction, reaching behind  Better with: Heat   Treatments tried: heat and Tylenol  Associated symptoms: no distal numbness or tingling; denies swelling or warmth  Orthopedic history: NO  Relevant surgical history: NO  Patient Social History: retired    Patient's past medical, surgical, social, and family histories were reviewed today and no changes are  "noted.    REVIEW OF SYSTEMS:  10 point ROS is negative other than symptoms noted above in HPI, Past Medical History or as stated below  Constitutional: NEGATIVE for fever, chills, change in weight  Skin: NEGATIVE for worrisome rashes, moles or lesions  GI/: NEGATIVE for bowel or bladder changes  Neuro: NEGATIVE for weakness, dizziness or paresthesias    OBJECTIVE:  /78   Ht 1.676 m (5' 6\")   BMI 29.99 kg/m     General: healthy, alert and in no distress  HEENT: no scleral icterus or conjunctival erythema  Skin: no suspicious lesions or rash. No jaundice.  CV: regular rhythm by palpation  Resp: normal respiratory effort without conversational dyspnea   Psych: normal mood and affect  Gait: normal steady gait with appropriate coordination and balance  Neuro: normal light touch sensory exam of the bilateral upper extremities.    MSK:  LEFT SHOULDER  Inspection:    no atrophy  Palpation:    Tender about the anterior capsule. Remainder of bony and tendinous landmarks are nontender.  Active Range of Motion:     Abduction 1050, FF 1050, , both passive and active  Strength:    Scapular plane abduction 5/5,  ER 5/5, IR 5/5    Independent visualization of the below image:  Xray Left shoulder  Well preserved GH and AC joints. Patient outlet. No fracture or acute osseous findings.    Maury Lozano DO [facility]        Again, thank you for allowing me to participate in the care of your patient.        Sincerely,        Maury Lozano, DO    "

## 2020-12-29 NOTE — PATIENT INSTRUCTIONS
1. Chronic left shoulder pain    2. Adhesive capsulitis of left shoulder      Steroid injection of the left shoulder: intra-articular  was performed today in clinic  Referral to physical therapy. Given a hard copy for your to take and use closer to your home    - Would not soak in a hot tub, bath or swimming pool for 48 hours  - Ok to shower  - Ice today and only do your normal amounts of activity  - The lidocaine (what is giving you pain relief right now) will likely stop working in 1-2 hours.  You will then have pain again, similar to before you received the injection. The corticosteroid will not start working until approximately 1-2 weeks from now.  In a small percentage of people, cortisone can cause flushing/redness in the face. This usually lasts for 1-3 days and resolves. Cool compress and Ibuprofen/Tylenol can help if this happens.

## 2021-01-10 DIAGNOSIS — I10 ESSENTIAL HYPERTENSION, BENIGN: ICD-10-CM

## 2021-01-11 RX ORDER — LISINOPRIL 30 MG/1
TABLET ORAL
Qty: 90 TABLET | Refills: 2 | Status: SHIPPED | OUTPATIENT
Start: 2021-01-11 | End: 2021-10-05 | Stop reason: DRUGHIGH

## 2021-01-11 RX ORDER — ROPIVACAINE HYDROCHLORIDE 5 MG/ML
3 INJECTION, SOLUTION EPIDURAL; INFILTRATION; PERINEURAL
Status: DISCONTINUED | OUTPATIENT
Start: 2021-01-11 | End: 2022-04-06

## 2021-01-11 RX ORDER — METHYLPREDNISOLONE ACETATE 40 MG/ML
40 INJECTION, SUSPENSION INTRA-ARTICULAR; INTRALESIONAL; INTRAMUSCULAR; SOFT TISSUE
Status: DISCONTINUED | OUTPATIENT
Start: 2021-01-11 | End: 2022-04-06

## 2021-01-11 RX ADMIN — METHYLPREDNISOLONE ACETATE 40 MG: 40 INJECTION, SUSPENSION INTRA-ARTICULAR; INTRALESIONAL; INTRAMUSCULAR; SOFT TISSUE at 12:40

## 2021-01-11 RX ADMIN — ROPIVACAINE HYDROCHLORIDE 3 ML: 5 INJECTION, SOLUTION EPIDURAL; INFILTRATION; PERINEURAL at 12:40

## 2021-01-11 NOTE — TELEPHONE ENCOUNTER
Pending Prescriptions:                       Disp   Refills    lisinopril (ZESTRIL) 30 MG tablet [Pharma*90 tab*1            Sig: TAKE 1 TABLET(30 MG) BY MOUTH DAILY      Prescription approved per [facility] Refill Protocol.

## 2021-03-09 ENCOUNTER — TELEPHONE (OUTPATIENT)
Dept: INTERNAL MEDICINE | Facility: CLINIC | Age: 81
End: 2021-03-09

## 2021-03-09 NOTE — TELEPHONE ENCOUNTER
Patient received the [employer] covid vaccine on 3/7/21 at the [facility] pharmacy.

## 2021-03-27 ENCOUNTER — HEALTH MAINTENANCE LETTER (OUTPATIENT)
Age: 81
End: 2021-03-27

## 2021-04-06 ENCOUNTER — OFFICE VISIT (OUTPATIENT)
Dept: INTERNAL MEDICINE | Facility: CLINIC | Age: 81
End: 2021-04-06
Payer: MEDICARE

## 2021-04-06 VITALS
HEIGHT: 66 IN | HEART RATE: 90 BPM | TEMPERATURE: 98.2 F | BODY MASS INDEX: 29.62 KG/M2 | RESPIRATION RATE: 15 BRPM | WEIGHT: 184.3 LBS | DIASTOLIC BLOOD PRESSURE: 82 MMHG | SYSTOLIC BLOOD PRESSURE: 136 MMHG | OXYGEN SATURATION: 97 %

## 2021-04-06 DIAGNOSIS — G47.00 INSOMNIA, UNSPECIFIED TYPE: ICD-10-CM

## 2021-04-06 DIAGNOSIS — E78.5 HYPERLIPIDEMIA LDL GOAL <130: ICD-10-CM

## 2021-04-06 DIAGNOSIS — R60.9 EDEMA, UNSPECIFIED TYPE: ICD-10-CM

## 2021-04-06 DIAGNOSIS — I10 ESSENTIAL HYPERTENSION, BENIGN: Primary | ICD-10-CM

## 2021-04-06 DIAGNOSIS — N18.31 STAGE 3A CHRONIC KIDNEY DISEASE (H): ICD-10-CM

## 2021-04-06 LAB
ALBUMIN SERPL-MCNC: 3.8 G/DL (ref 3.4–5)
ALP SERPL-CCNC: 67 U/L (ref 40–150)
ALT SERPL W P-5'-P-CCNC: 27 U/L (ref 0–50)
ANION GAP SERPL CALCULATED.3IONS-SCNC: 3 MMOL/L (ref 3–14)
AST SERPL W P-5'-P-CCNC: 17 U/L (ref 0–45)
BILIRUB SERPL-MCNC: 1.3 MG/DL (ref 0.2–1.3)
BUN SERPL-MCNC: 18 MG/DL (ref 7–30)
CALCIUM SERPL-MCNC: 9.7 MG/DL (ref 8.5–10.1)
CHLORIDE SERPL-SCNC: 105 MMOL/L (ref 94–109)
CO2 SERPL-SCNC: 30 MMOL/L (ref 20–32)
CREAT SERPL-MCNC: 1.08 MG/DL (ref 0.52–1.04)
GFR SERPL CREATININE-BSD FRML MDRD: 48 ML/MIN/{1.73_M2}
GLUCOSE SERPL-MCNC: 116 MG/DL (ref 70–99)
POTASSIUM SERPL-SCNC: 4.6 MMOL/L (ref 3.4–5.3)
PROT SERPL-MCNC: 7.4 G/DL (ref 6.8–8.8)
SODIUM SERPL-SCNC: 138 MMOL/L (ref 133–144)

## 2021-04-06 PROCEDURE — 99214 OFFICE O/P EST MOD 30 MIN: CPT | Performed by: INTERNAL MEDICINE

## 2021-04-06 PROCEDURE — 36415 COLL VENOUS BLD VENIPUNCTURE: CPT | Performed by: INTERNAL MEDICINE

## 2021-04-06 PROCEDURE — 80053 COMPREHEN METABOLIC PANEL: CPT | Performed by: INTERNAL MEDICINE

## 2021-04-06 RX ORDER — FUROSEMIDE 20 MG
20 TABLET ORAL 2 TIMES DAILY
Qty: 180 TABLET | Refills: 1 | Status: SHIPPED | OUTPATIENT
Start: 2021-04-06 | End: 2021-10-05

## 2021-04-06 RX ORDER — ESZOPICLONE 1 MG/1
TABLET, FILM COATED ORAL
Qty: 14 TABLET | Refills: 0 | Status: SHIPPED | OUTPATIENT
Start: 2021-04-06 | End: 2021-10-05

## 2021-04-06 RX ORDER — ROSUVASTATIN CALCIUM 5 MG/1
5 TABLET, COATED ORAL DAILY
Qty: 90 TABLET | Refills: 1 | Status: SHIPPED | OUTPATIENT
Start: 2021-04-06 | End: 2021-10-05

## 2021-04-06 ASSESSMENT — MIFFLIN-ST. JEOR: SCORE: 1317.73

## 2021-04-06 NOTE — PROGRESS NOTES
"    Assessment & Plan     (I10) Essential hypertension, benign  (primary encounter diagnosis)  Comment: Blood pressure stable  Plan: Continue lisinopril 30 mg daily and Lasix as directed, check comprehensive metabolic panel.Advised to follow low salt diet and exercise and f/u in 6 mths.            (G47.00) Insomnia, unspecified type  Plan: Uses Lunesta rarely , refilled eszopiclone (LUNESTA) 1 MG tablet as directed.explained clearly about the medication,insructions and side effects.Advised not to drive or operate any machinery while on this med            (N18.31) Stage 3a chronic kidney disease  Plan: Monitor creatinine, avoid nephrotoxins    (E78.5) Hyperlipidemia LDL goal <130  Plan: rosuvastatin (CRESTOR) 5 MG tablet refilled as directed..explained clearly about the medication,insructions and side effects.            (R60.9) Edema, unspecified type  Plan: furosemide (LASIX) 20 MG tablet refilled as directed.explained clearly about the medication,insructions and side effects.             Review of the result(s) of each unique test - CMP       BMI:   Estimated body mass index is 29.75 kg/m  as calculated from the following:    Height as of this encounter: 1.676 m (5' 6\").    Weight as of this encounter: 83.6 kg (184 lb 4.8 oz).          Return in about 6 months (around 10/6/2021).    Adri Malcolm MD  [facility] JOSH Akins is a 81 year old who presents for the following health issues   HPI     Hyperlipidemia Follow-Up      Are you regularly taking any medication or supplement to lower your cholesterol?   Yes- statin    Are you having muscle aches or other side effects that you think could be caused by your cholesterol lowering medication?  No    Hypertension Follow-up      Do you check your blood pressure regularly outside of the clinic? Yes     Are you following a low salt diet? Yes    Are your blood pressures ever more than 140 on the top number (systolic) OR " more   than 90 on the bottom number (diastolic), for example 140/90? Yes      How many servings of fruits and vegetables do you eat daily?  2-3    On average, how many sweetened beverages do you drink each day (Examples: soda, juice, sweet tea, etc.  Do NOT count diet or artificially sweetened beverages)?   1    How many days per week do you exercise enough to make your heart beat faster? 3 or less    How many minutes a day do you exercise enough to make your heart beat faster? 30 - 60    How many days per week do you miss taking your medication? 0    Past Medical History:   Diagnosis Date     Essential hypertension, benign      Mixed hyperlipidemia      Osteoporosis, unspecified      Other seborrheic keratosis     sees DERM     Tobacco use disorder     quit smoking in 09/12       Current Outpatient Medications   Medication Sig Dispense Refill     aspirin 81 MG tablet Take 81 mg by mouth daily       CALCIUM 600 + D 600-200 MG-UNIT OR TABS 1 tid  0     eszopiclone (LUNESTA) 1 MG tablet 1 tab by mouth 2-3 times per week as needed for  insomnia 14 tablet 0     fish oil-omega-3 fatty acids (FISH OIL) 1000 MG capsule Take 2 g by mouth daily.       furosemide (LASIX) 20 MG tablet Take 1 tablet (20 mg) by mouth 2 times daily 180 tablet 1     lisinopril (ZESTRIL) 30 MG tablet TAKE 1 TABLET(30 MG) BY MOUTH DAILY 90 tablet 2     omeprazole (PRILOSEC) 20 MG DR capsule TAKE 1 CAPSULE(20 MG) BY MOUTH DAILY 90 capsule 1     rosuvastatin (CRESTOR) 5 MG tablet Take 1 tablet (5 mg) by mouth daily 90 tablet 1     sennosides (SENOKOT) 8.6 MG tablet Take 2 tablets by mouth daily           Review of Systems   CONSTITUTIONAL: NEGATIVE for fever, chills, change in weight  RESP: NEGATIVE for significant cough or SOB  CV: NEGATIVE for chest pain, palpitations or peripheral edema  MUSCULOSKELETAL: NEGATIVE for significant arthralgias or myalgia  PSYCHIATRIC: NEGATIVE for changes in mood or affect      Objective    /82   Pulse 90    "Temp 98.2  F (36.8  C) (Oral)   Resp 15   Ht 1.676 m (5' 6\")   Wt 83.6 kg (184 lb 4.8 oz)   SpO2 97%   BMI 29.75 kg/m    Body mass index is 29.75 kg/m .  Physical Exam   GENERAL: healthy, alert and no distress  RESP: lungs clear to auscultation - no rales, rhonchi or wheezes  CV: regular rate and rhythm,   MS: no gross musculoskeletal defects noted, no edema  PSYCH: mentation appears normal, affect normal/bright              "

## 2021-04-06 NOTE — NURSING NOTE
"/82   Pulse 90   Temp 98.2  F (36.8  C) (Oral)   Resp 15   Ht 1.676 m (5' 6\")   Wt 83.6 kg (184 lb 4.8 oz)   SpO2 97%   BMI 29.75 kg/m    Patient in for medication check.  Annelise Gibson CMA    "

## 2021-04-22 DIAGNOSIS — E78.5 HYPERLIPIDEMIA LDL GOAL <130: ICD-10-CM

## 2021-04-23 RX ORDER — ROSUVASTATIN CALCIUM 5 MG/1
TABLET, COATED ORAL
Qty: 90 TABLET | Refills: 1 | OUTPATIENT
Start: 2021-04-23

## 2021-07-07 DIAGNOSIS — R73.09 ELEVATED GLUCOSE: ICD-10-CM

## 2021-07-07 LAB — HBA1C MFR BLD: 6.2 % (ref 0–5.6)

## 2021-07-07 PROCEDURE — 82565 ASSAY OF CREATININE: CPT | Performed by: INTERNAL MEDICINE

## 2021-07-07 PROCEDURE — 82947 ASSAY GLUCOSE BLOOD QUANT: CPT | Performed by: INTERNAL MEDICINE

## 2021-07-07 PROCEDURE — 84520 ASSAY OF UREA NITROGEN: CPT | Performed by: INTERNAL MEDICINE

## 2021-07-07 PROCEDURE — 83036 HEMOGLOBIN GLYCOSYLATED A1C: CPT | Performed by: INTERNAL MEDICINE

## 2021-07-07 PROCEDURE — 36415 COLL VENOUS BLD VENIPUNCTURE: CPT | Performed by: INTERNAL MEDICINE

## 2021-07-08 LAB
BUN SERPL-MCNC: 19 MG/DL (ref 7–30)
CREAT SERPL-MCNC: 1.17 MG/DL (ref 0.52–1.04)
GFR SERPL CREATININE-BSD FRML MDRD: 44 ML/MIN/{1.73_M2}
GLUCOSE SERPL-MCNC: 97 MG/DL (ref 70–99)

## 2021-07-30 ENCOUNTER — HOSPITAL ENCOUNTER (OUTPATIENT)
Dept: MAMMOGRAPHY | Facility: CLINIC | Age: 81
Discharge: HOME | End: 2021-07-30
Attending: INTERNAL MEDICINE | Admitting: INTERNAL MEDICINE
Payer: MEDICARE

## 2021-07-30 DIAGNOSIS — Z12.31 SCREENING MAMMOGRAM FOR HIGH-RISK PATIENT: ICD-10-CM

## 2021-07-30 PROCEDURE — 77063 BREAST TOMOSYNTHESIS BI: CPT

## 2021-09-05 ENCOUNTER — HEALTH MAINTENANCE LETTER (OUTPATIENT)
Age: 81
End: 2021-09-05

## 2021-09-20 DIAGNOSIS — K21.9 GASTROESOPHAGEAL REFLUX DISEASE, UNSPECIFIED WHETHER ESOPHAGITIS PRESENT: ICD-10-CM

## 2021-09-23 NOTE — TELEPHONE ENCOUNTER
Pending Prescriptions:                       Disp   Refills    omeprazole (PRILOSEC) 20 MG DR capsule [Ph*90 cap*1        Sig: TAKE 1 CAPSULE(20 MG) BY MOUTH DAILY    Routing refill request to provider for review/approval because:  Patient fails protocol     Next 5 appointments (look out 90 days)      Oct 05, 2021 10:20 AM  SHORT with Adri Malcolm MD  [facility] ([facility] ) [facility] [address]  [facility] [address] [phone]

## 2021-10-05 ENCOUNTER — OFFICE VISIT (OUTPATIENT)
Dept: INTERNAL MEDICINE | Facility: CLINIC | Age: 81
End: 2021-10-05
Payer: MEDICARE

## 2021-10-05 VITALS
TEMPERATURE: 97.9 F | WEIGHT: 177 LBS | SYSTOLIC BLOOD PRESSURE: 150 MMHG | RESPIRATION RATE: 16 BRPM | OXYGEN SATURATION: 99 % | HEIGHT: 66 IN | HEART RATE: 77 BPM | DIASTOLIC BLOOD PRESSURE: 70 MMHG | BODY MASS INDEX: 28.45 KG/M2

## 2021-10-05 DIAGNOSIS — Z23 NEED FOR PROPHYLACTIC VACCINATION AND INOCULATION AGAINST INFLUENZA: ICD-10-CM

## 2021-10-05 DIAGNOSIS — R60.9 EDEMA, UNSPECIFIED TYPE: ICD-10-CM

## 2021-10-05 DIAGNOSIS — I10 ESSENTIAL HYPERTENSION, BENIGN: Primary | ICD-10-CM

## 2021-10-05 DIAGNOSIS — N18.31 STAGE 3A CHRONIC KIDNEY DISEASE (H): ICD-10-CM

## 2021-10-05 DIAGNOSIS — E78.5 HYPERLIPIDEMIA LDL GOAL <130: ICD-10-CM

## 2021-10-05 DIAGNOSIS — G47.00 INSOMNIA, UNSPECIFIED TYPE: ICD-10-CM

## 2021-10-05 PROCEDURE — 80053 COMPREHEN METABOLIC PANEL: CPT | Performed by: INTERNAL MEDICINE

## 2021-10-05 PROCEDURE — G0008 ADMIN INFLUENZA VIRUS VAC: HCPCS | Performed by: INTERNAL MEDICINE

## 2021-10-05 PROCEDURE — 36415 COLL VENOUS BLD VENIPUNCTURE: CPT | Performed by: INTERNAL MEDICINE

## 2021-10-05 PROCEDURE — 90662 IIV NO PRSV INCREASED AG IM: CPT | Performed by: INTERNAL MEDICINE

## 2021-10-05 PROCEDURE — 80061 LIPID PANEL: CPT | Performed by: INTERNAL MEDICINE

## 2021-10-05 PROCEDURE — 99214 OFFICE O/P EST MOD 30 MIN: CPT | Mod: 25 | Performed by: INTERNAL MEDICINE

## 2021-10-05 RX ORDER — FUROSEMIDE 20 MG
20 TABLET ORAL 2 TIMES DAILY
Qty: 180 TABLET | Refills: 1 | Status: SHIPPED | OUTPATIENT
Start: 2021-10-05 | End: 2022-04-06

## 2021-10-05 RX ORDER — ESZOPICLONE 1 MG/1
TABLET, FILM COATED ORAL
Qty: 14 TABLET | Refills: 0 | Status: SHIPPED | OUTPATIENT
Start: 2021-10-05 | End: 2022-05-11

## 2021-10-05 RX ORDER — ROSUVASTATIN CALCIUM 5 MG/1
5 TABLET, COATED ORAL DAILY
Qty: 90 TABLET | Refills: 1 | Status: SHIPPED | OUTPATIENT
Start: 2021-10-05 | End: 2022-04-06

## 2021-10-05 RX ORDER — LISINOPRIL 40 MG/1
40 TABLET ORAL DAILY
Qty: 90 TABLET | Refills: 0 | Status: SHIPPED | OUTPATIENT
Start: 2021-10-05 | End: 2021-12-01

## 2021-10-05 ASSESSMENT — MIFFLIN-ST. JEOR: SCORE: 1284.62

## 2021-10-05 NOTE — PROGRESS NOTES
"   Assessment & Plan     (I10) Essential hypertension, benign  (primary encounter diagnosis)  Comment: Start blood pressure elevated  Plan: Increase lisinopril (ZESTRIL) to 40 MG tablet once daily as directed.explained clearly about the medication,insructions and side effects.  Advised to follow low-sodium diet regular exercise and follow-up in 2 months, check Comprehensive metabolic panel            (R60.9) Edema, unspecified type  Comment: Stable  Plan: furosemide (LASIX) 20 MG tablet refilled..explained clearly about the medication,insructions and side effects.            (G47.00) Insomnia, unspecified type  Plan: eszopiclone (LUNESTA) 1 MG tablet takes 2-3 times a week as needed only, refilled as directed.explained clearly about the medication,insructions and side effects. advised not to drive or operate any machinery while on this med            (N18.31) Stage 3a chronic kidney disease (H)  Plan: monitor creatinine , avoid nephrotoxins    (E78.5) Hyperlipidemia LDL goal <130  Plan: rosuvastatin (CRESTOR) 5 MG tablet refilled.explained clearly about the medication,insructions and side effects.  Check  Lipid panel  reflex to direct LDL Fasting            (Z23) Need for prophylactic vaccination and inoculation against influenza  Plan: INFLUENZA, QUAD, HIGH DOSE, PF, 65YR + (FLUZONE        HD), ADMIN INFLUENZA (For MEDICARE Patients         ONLY) []             Review of the result(s) of each unique test - CMP, Lipids   Prescription drug management     BMI:   Estimated body mass index is 28.57 kg/m  as calculated from the following:    Height as of this encounter: 1.676 m (5' 6\").    Weight as of this encounter: 80.3 kg (177 lb).       Return in about 8 weeks (around 12/1/2021) for BP Recheck.    Adri Malcolm MD  [facility] is [facility] 81 year [facility] who presents for the following health issues     HPI     Hyperlipidemia Follow-Up      Are you regularly taking " any medication or supplement to lower your cholesterol?   Yes- crestotr     Are you having muscle aches or other side effects that you think could be caused by your cholesterol lowering medication?  No    Hypertension Follow-up      Do you check your blood pressure regularly outside of the clinic? No     Are you following a low salt diet? Yes    Are your blood pressures ever more than 140 on the top number (systolic) OR more   than 90 on the bottom number (diastolic), for example 140/90? No    Edema follow-up; on Lasix    Insomnia follow-up; on Lunesta with good symptom relief and requesting refills         How many servings of fruits and vegetables do you eat daily?  4 or more    On average, how many sweetened beverages do you drink each day (Examples: soda, juice, sweet tea, etc.  Do NOT count diet or artificially sweetened beverages)?   0    How many days per week do you exercise enough to make your heart beat faster? 7    How many minutes a day do you exercise enough to make your heart beat faster? 30 - 60    How many days per week do you miss taking your medication? 0    Past Medical History:   Diagnosis Date     Essential hypertension, benign      Mixed hyperlipidemia      Osteoporosis, unspecified      Other seborrheic keratosis     sees DERM     Tobacco use disorder     quit smoking in 09/12       Current Outpatient Medications   Medication Sig Dispense Refill     aspirin 81 MG tablet Take 81 mg by mouth daily       CALCIUM 600 + D 600-200 MG-UNIT OR TABS 1 tid  0     eszopiclone (LUNESTA) 1 MG tablet 1 tab by mouth 2-3 times per week as needed for  insomnia 14 tablet 0     fish oil-omega-3 fatty acids (FISH OIL) 1000 MG capsule Take 2 g by mouth daily.       furosemide (LASIX) 20 MG tablet Take 1 tablet (20 mg) by mouth 2 times daily 180 tablet 1     lisinopril (ZESTRIL) 40 MG tablet Take 1 tablet (40 mg) by mouth daily 90 tablet 0     omeprazole (PRILOSEC) 20 MG DR capsule TAKE 1 CAPSULE(20 MG) BY MOUTH  "DAILY 90 capsule 1     rosuvastatin (CRESTOR) 5 MG tablet Take 1 tablet (5 mg) by mouth daily 90 tablet 1     sennosides (SENOKOT) 8.6 MG tablet Take 2 tablets by mouth daily           Review of Systems   CONSTITUTIONAL: NEGATIVE for fever, chills, change in weight  ENT/MOUTH: NEGATIVE for ear, mouth and throat problems  RESP: NEGATIVE for significant cough or SOB  CV: NEGATIVE for chest pain, palpitations or peripheral edema  MUSCULOSKELETAL: NEGATIVE for significant arthralgias or myalgia  NEURO: NEGATIVE for weakness, dizziness or paresthesias  PSYCHIATRIC: NEGATIVE for changes in mood or affect      Objective    BP (!) 150/70   Pulse 77   Temp 97.9  F (36.6  C) (Oral)   Resp 16   Ht 1.676 m (5' 6\")   Wt 80.3 kg (177 lb)   SpO2 99%   BMI 28.57 kg/m    Body mass index is 28.57 kg/m .  Physical Exam   GENERAL:   alert and no distress  EYES: Eyes grossly normal to inspection, PERRL and conjunctivae and sclerae normal  RESP: lungs clear to auscultation - no rales, rhonchi or wheezes  CV: regular rate and rhythm,    MS: no gross musculoskeletal defects noted, no edema, no calf tenderness  NEURO: Normal strength and tone, mentation intact and speech normal  PSYCH: mentation appears normal, affect normal/bright       "

## 2021-10-06 LAB
ALBUMIN SERPL-MCNC: 3.8 G/DL (ref 3.4–5)
ALP SERPL-CCNC: 59 U/L (ref 40–150)
ALT SERPL W P-5'-P-CCNC: 23 U/L (ref 0–50)
ANION GAP SERPL CALCULATED.3IONS-SCNC: 3 MMOL/L (ref 3–14)
AST SERPL W P-5'-P-CCNC: 17 U/L (ref 0–45)
BILIRUB SERPL-MCNC: 0.7 MG/DL (ref 0.2–1.3)
BUN SERPL-MCNC: 19 MG/DL (ref 7–30)
CALCIUM SERPL-MCNC: 9.1 MG/DL (ref 8.5–10.1)
CHLORIDE BLD-SCNC: 106 MMOL/L (ref 94–109)
CHOLEST SERPL-MCNC: 174 MG/DL
CO2 SERPL-SCNC: 30 MMOL/L (ref 20–32)
CREAT SERPL-MCNC: 1.17 MG/DL (ref 0.52–1.04)
FASTING STATUS PATIENT QL REPORTED: YES
GFR SERPL CREATININE-BSD FRML MDRD: 44 ML/MIN/1.73M2
GLUCOSE BLD-MCNC: 106 MG/DL (ref 70–99)
HDLC SERPL-MCNC: 65 MG/DL
LDLC SERPL CALC-MCNC: 75 MG/DL
NONHDLC SERPL-MCNC: 109 MG/DL
POTASSIUM BLD-SCNC: 4.5 MMOL/L (ref 3.4–5.3)
PROT SERPL-MCNC: 7.3 G/DL (ref 6.8–8.8)
SODIUM SERPL-SCNC: 139 MMOL/L (ref 133–144)
TRIGL SERPL-MCNC: 169 MG/DL

## 2021-12-01 ENCOUNTER — OFFICE VISIT (OUTPATIENT)
Dept: INTERNAL MEDICINE | Facility: CLINIC | Age: 81
End: 2021-12-01
Payer: MEDICARE

## 2021-12-01 VITALS
BODY MASS INDEX: 28.75 KG/M2 | WEIGHT: 178.9 LBS | HEIGHT: 66 IN | TEMPERATURE: 96.3 F | OXYGEN SATURATION: 97 % | HEART RATE: 77 BPM | RESPIRATION RATE: 12 BRPM | DIASTOLIC BLOOD PRESSURE: 70 MMHG | SYSTOLIC BLOOD PRESSURE: 136 MMHG

## 2021-12-01 DIAGNOSIS — N39.0 URINARY TRACT INFECTION WITH HEMATURIA, SITE UNSPECIFIED: ICD-10-CM

## 2021-12-01 DIAGNOSIS — R35.0 URINARY FREQUENCY: Primary | ICD-10-CM

## 2021-12-01 DIAGNOSIS — R31.9 URINARY TRACT INFECTION WITH HEMATURIA, SITE UNSPECIFIED: ICD-10-CM

## 2021-12-01 DIAGNOSIS — I10 ESSENTIAL HYPERTENSION, BENIGN: ICD-10-CM

## 2021-12-01 LAB
ALBUMIN UR-MCNC: NEGATIVE MG/DL
APPEARANCE UR: CLEAR
BACTERIA #/AREA URNS HPF: ABNORMAL /HPF
BILIRUB UR QL STRIP: NEGATIVE
COLOR UR AUTO: YELLOW
GLUCOSE UR STRIP-MCNC: NEGATIVE MG/DL
HGB UR QL STRIP: ABNORMAL
KETONES UR STRIP-MCNC: NEGATIVE MG/DL
LEUKOCYTE ESTERASE UR QL STRIP: ABNORMAL
NITRATE UR QL: NEGATIVE
PH UR STRIP: 5.5 [PH] (ref 5–7)
RBC #/AREA URNS AUTO: ABNORMAL /HPF
SP GR UR STRIP: 1.02 (ref 1–1.03)
SQUAMOUS #/AREA URNS AUTO: ABNORMAL /LPF
UROBILINOGEN UR STRIP-ACNC: 0.2 E.U./DL
WBC #/AREA URNS AUTO: ABNORMAL /HPF

## 2021-12-01 PROCEDURE — 99214 OFFICE O/P EST MOD 30 MIN: CPT | Performed by: INTERNAL MEDICINE

## 2021-12-01 PROCEDURE — 87086 URINE CULTURE/COLONY COUNT: CPT | Performed by: INTERNAL MEDICINE

## 2021-12-01 PROCEDURE — 81001 URINALYSIS AUTO W/SCOPE: CPT | Performed by: INTERNAL MEDICINE

## 2021-12-01 RX ORDER — LISINOPRIL 40 MG/1
40 TABLET ORAL DAILY
Qty: 90 TABLET | Refills: 1 | Status: SHIPPED | OUTPATIENT
Start: 2021-12-01 | End: 2022-04-06

## 2021-12-01 RX ORDER — NITROFURANTOIN 25; 75 MG/1; MG/1
100 CAPSULE ORAL 2 TIMES DAILY
Qty: 14 CAPSULE | Refills: 0 | Status: SHIPPED | OUTPATIENT
Start: 2021-12-01 | End: 2022-04-06

## 2021-12-01 ASSESSMENT — MIFFLIN-ST. JEOR: SCORE: 1293.24

## 2021-12-01 NOTE — NURSING NOTE
"/70   Pulse 77   Temp (!) 96.3  F (35.7  C) (Axillary)   Resp 12   Ht 1.676 m (5' 6\")   Wt 81.1 kg (178 lb 14.4 oz)   SpO2 97%   BMI 28.88 kg/m    Patient in for medication follow up.  "

## 2021-12-01 NOTE — PROGRESS NOTES
Assessment & Plan        (I10) Essential hypertension, benign  Comment: Blood pressure has improved  Plan: lisinopril (ZESTRIL) 40 MG tablet refilled as directed.explained clearly about the medication,insructions and side effects.   Advised to follow low salt diet and exercise and f/u in 4- 6 mths.        (R35.0) Urinary frequency  Plan: UA with Microscopic reflex to Culture - lab         collect, Urine Microscopic, Urine Culture    (N39.0,  R31.9) Urinary tract infection with hematuria, site unspecified  Comment: Mild UTI, urine culture pending  Plan: Started on nitroFURantoin macrocrystal-monohydrate  (MACROBID) 100 MG capsule as directed.explained clearly about the medication,insructions and side effects.            Review of the result(s) of each unique test - UA/UC  Ordering of each unique test  Prescription drug management     Return in about 18 weeks (around 4/6/2022) for BP Recheck.    Adri Malcolm MD  [facility] JOSH Akins is a 81 year old who presents for the following health issues     HPI     Hypertension Follow-up      Do you check your blood pressure regularly outside of the clinic? Yes  137/68, 132/56, lisinopril was increased to 40 mg at last office visit, tolerating well.    Are you following a low salt diet? Yes    Are your blood pressures ever more than 140 on the top number (systolic) OR more   than 90 on the bottom number (diastolic), for example 140/90? Yes      URINARY TRACT SYMPTOMS      Duration: 2-3 wks    Description  dysuria, frequency and urgency    Intensity:  moderate    Accompanying signs and symptoms:  Fever/chills: no   Flank pain no   Nausea and vomiting: no   Vaginal symptoms: none  Abdominal/Pelvic Pain: no     History  History of frequent UTI's: no   History of kidney stones: no   Sexually Active: no   Possibility of pregnancy: No    Precipitating or alleviating factors: None    Therapies tried and outcome: increase fluid intake  "          Past Medical History:   Diagnosis Date     Essential hypertension, benign      Mixed hyperlipidemia      Osteoporosis, unspecified      Other seborrheic keratosis     sees DERM     Tobacco use disorder     quit smoking in 09/12       Current Outpatient Medications   Medication Sig Dispense Refill     aspirin 81 MG tablet Take 81 mg by mouth daily       CALCIUM 600 + D 600-200 MG-UNIT OR TABS 1 tid  0     eszopiclone (LUNESTA) 1 MG tablet 1 tab by mouth 2-3 times per week as needed for  insomnia 14 tablet 0     fish oil-omega-3 fatty acids (FISH OIL) 1000 MG capsule Take 2 g by mouth daily.       furosemide (LASIX) 20 MG tablet Take 1 tablet (20 mg) by mouth 2 times daily 180 tablet 1     lisinopril (ZESTRIL) 40 MG tablet Take 1 tablet (40 mg) by mouth daily 90 tablet 1     omeprazole (PRILOSEC) 20 MG DR capsule TAKE 1 CAPSULE(20 MG) BY MOUTH DAILY 90 capsule 1     rosuvastatin (CRESTOR) 5 MG tablet Take 1 tablet (5 mg) by mouth daily 90 tablet 1     sennosides (SENOKOT) 8.6 MG tablet Take 2 tablets by mouth daily           Review of Systems   CONSTITUTIONAL: NEGATIVE for fever, chills, change in weight  RESP: NEGATIVE for significant cough or SOB  CV: NEGATIVE for chest pain, palpitations or peripheral edema  : dysuria and frequency       Objective    /70   Pulse 77   Temp (!) 96.3  F (35.7  C) (Axillary)   Resp 12   Ht 1.676 m (5' 6\")   Wt 81.1 kg (178 lb 14.4 oz)   SpO2 97%   BMI 28.88 kg/m    Body mass index is 28.88 kg/m .  Physical Exam   GENERAL: healthy, alert and no distress  RESP: lungs clear to auscultation - no rales, rhonchi or wheezes  CV: regular rate and rhythm, normal S1 S2,   ABDOMEN: soft, nontender,  and bowel sounds normal  MS:  trace edema, no calf tenderness  BACK: no CVA tenderness, no paralumbar tenderness    Results for orders placed or performed in visit on 12/01/21 (from the past 24 hour(s))   UA with Microscopic reflex to Culture - lab collect    Specimen: Urine, " Midstream   Result Value Ref Range    Color Urine Yellow Colorless, Straw, Light Yellow, Yellow    Appearance Urine Clear Clear    Glucose Urine Negative Negative mg/dL    Bilirubin Urine Negative Negative    Ketones Urine Negative Negative mg/dL    Specific Gravity Urine 1.025 1.003 - 1.035    Blood Urine Trace (A) Negative    pH Urine 5.5 5.0 - 7.0    Protein Albumin Urine Negative Negative mg/dL    Urobilinogen Urine 0.2 0.2, 1.0 E.U./dL    Nitrite Urine Negative Negative    Leukocyte Esterase Urine Moderate (A) Negative   Urine Microscopic   Result Value Ref Range    Bacteria Urine Moderate (A) None Seen /HPF    RBC Urine 0-2 0-2 /HPF /HPF    WBC Urine 5-10 (A) 0-5 /HPF /HPF    Squamous Epithelials Urine Many (A) None Seen /LPF    Narrative    Urine Culture not indicated

## 2021-12-02 LAB — BACTERIA UR CULT: NORMAL

## 2022-04-06 ENCOUNTER — OFFICE VISIT (OUTPATIENT)
Dept: INTERNAL MEDICINE | Facility: CLINIC | Age: 82
End: 2022-04-06
Payer: MEDICARE

## 2022-04-06 VITALS
DIASTOLIC BLOOD PRESSURE: 69 MMHG | WEIGHT: 183 LBS | TEMPERATURE: 97 F | RESPIRATION RATE: 12 BRPM | OXYGEN SATURATION: 96 % | HEIGHT: 66 IN | HEART RATE: 75 BPM | BODY MASS INDEX: 29.41 KG/M2 | SYSTOLIC BLOOD PRESSURE: 151 MMHG

## 2022-04-06 DIAGNOSIS — I10 ESSENTIAL HYPERTENSION, BENIGN: ICD-10-CM

## 2022-04-06 DIAGNOSIS — E78.5 HYPERLIPIDEMIA LDL GOAL <130: ICD-10-CM

## 2022-04-06 DIAGNOSIS — R73.03 PREDIABETES: ICD-10-CM

## 2022-04-06 DIAGNOSIS — R60.9 EDEMA, UNSPECIFIED TYPE: ICD-10-CM

## 2022-04-06 DIAGNOSIS — Z00.00 ENCOUNTER FOR MEDICARE ANNUAL WELLNESS EXAM: Primary | ICD-10-CM

## 2022-04-06 DIAGNOSIS — N18.31 STAGE 3A CHRONIC KIDNEY DISEASE (H): ICD-10-CM

## 2022-04-06 LAB
HBA1C MFR BLD: 6.1 % (ref 0–5.6)
HGB BLD-MCNC: 13.2 G/DL (ref 11.7–15.7)

## 2022-04-06 PROCEDURE — 83036 HEMOGLOBIN GLYCOSYLATED A1C: CPT | Performed by: INTERNAL MEDICINE

## 2022-04-06 PROCEDURE — 99214 OFFICE O/P EST MOD 30 MIN: CPT | Mod: 25 | Performed by: INTERNAL MEDICINE

## 2022-04-06 PROCEDURE — 99397 PER PM REEVAL EST PAT 65+ YR: CPT | Performed by: INTERNAL MEDICINE

## 2022-04-06 PROCEDURE — 85018 HEMOGLOBIN: CPT | Performed by: INTERNAL MEDICINE

## 2022-04-06 PROCEDURE — 36415 COLL VENOUS BLD VENIPUNCTURE: CPT | Performed by: INTERNAL MEDICINE

## 2022-04-06 PROCEDURE — 80053 COMPREHEN METABOLIC PANEL: CPT | Performed by: INTERNAL MEDICINE

## 2022-04-06 RX ORDER — LISINOPRIL 40 MG/1
40 TABLET ORAL DAILY
Qty: 90 TABLET | Refills: 1 | Status: SHIPPED | OUTPATIENT
Start: 2022-04-06 | End: 2022-10-11

## 2022-04-06 RX ORDER — FUROSEMIDE 20 MG
20 TABLET ORAL 2 TIMES DAILY
Qty: 180 TABLET | Refills: 1 | Status: SHIPPED | OUTPATIENT
Start: 2022-04-06 | End: 2022-09-20

## 2022-04-06 RX ORDER — ROSUVASTATIN CALCIUM 5 MG/1
5 TABLET, COATED ORAL DAILY
Qty: 90 TABLET | Refills: 1 | Status: SHIPPED | OUTPATIENT
Start: 2022-04-06 | End: 2022-10-11

## 2022-04-06 RX ORDER — AMLODIPINE BESYLATE 2.5 MG/1
2.5 TABLET ORAL DAILY
Qty: 31 TABLET | Refills: 0 | Status: SHIPPED | OUTPATIENT
Start: 2022-04-06 | End: 2022-05-11

## 2022-04-06 RX ORDER — AMLODIPINE BESYLATE 2.5 MG/1
TABLET ORAL
Qty: 90 TABLET | OUTPATIENT
Start: 2022-04-06

## 2022-04-06 ASSESSMENT — ACTIVITIES OF DAILY LIVING (ADL): CURRENT_FUNCTION: NO ASSISTANCE NEEDED

## 2022-04-06 NOTE — NURSING NOTE
"BP (!) 158/72   Pulse 75   Temp 97  F (36.1  C) (Axillary)   Resp 12   Ht 1.676 m (5' 6\")   Wt 83 kg (183 lb)   SpO2 96%   BMI 29.54 kg/m    Patient in for medication follow up.  "

## 2022-04-06 NOTE — PROGRESS NOTES
"SUBJECTIVE:   Paris Hull is a 82 year old female who presents for Preventive Visit.      Patient has been advised of split billing requirements and indicates understanding: Yes  Are you in the first 12 months of your Medicare coverage?  No    Healthy Habits:     In general, how would you rate your overall health?  Good    Frequency of exercise:  4-5 days/week    Duration of exercise:  30-45 minutes    Do you usually eat at least 4 servings of fruit and vegetables a day, include whole grains    & fiber and avoid regularly eating high fat or \"junk\" foods?  Yes    Taking medications regularly:  No    Barriers to taking medications:  None    Medication side effects:  Not applicable    Ability to successfully perform activities of daily living:  No assistance needed    Home Safety:  No safety concerns identified    Hearing Impairment:  No hearing concerns    In the past 6 months, have you been bothered by leaking of urine?  No    In general, how would you rate your overall mental or emotional health?  Good      PHQ-2 Total Score: 0    Additional concerns today:  No    Do you feel safe in your environment? Yes    Have you ever done Advance Care Planning? (For example, a Health Directive, POLST, or a discussion with a medical provider or your loved ones about your wishes): Yes, advance care planning is on file.       Fall risk  Fallen 2 or more times in the past year?: No  Any fall with injury in the past year?: No    Cognitive Screening   1) Repeat 3 items (Leader, Season, Table)    2) Clock draw: NORMAL  3) 3 item recall: Recalls 2 objects   Results: NORMAL clock, 1-2 items recalled: COGNITIVE IMPAIRMENT LESS LIKELY    Mini-CogTM Copyright HOANG Webster. Licensed by the author for use in [facility]; reprinted with permission (ana@.[facility]). All rights reserved.      Do you have sleep apnea, excessive snoring or daytime drowsiness?: no    Reviewed and updated as needed this visit by clinical staff   Tobacco "  Allergies  Meds   Med Hx  Surg Hx  Fam Hx  Soc Hx        Reviewed and updated as needed this visit by Provider                 Past Medical History:   Diagnosis Date     Essential hypertension, benign      Mixed hyperlipidemia      Osteoporosis, unspecified      Other seborrheic keratosis     sees DERM     Tobacco use disorder     quit smoking in        Past Surgical History:   Procedure Laterality Date     COLONOSCOPY       COLONOSCOPY N/A 2015    Procedure: COLONOSCOPY;  Surgeon: Santosh Liao MD;  Location:  GI     HC REMOVAL OF TONSILS,<13 Y/O         Current Outpatient Medications   Medication Sig Dispense Refill     amLODIPine (NORVASC) 2.5 MG tablet Take 1 tablet (2.5 mg) by mouth daily 31 tablet 0     aspirin 81 MG tablet Take 81 mg by mouth daily       CALCIUM 600 + D 600-200 MG-UNIT OR TABS 1 tid  0     eszopiclone (LUNESTA) 1 MG tablet 1 tab by mouth 2-3 times per week as needed for  insomnia 14 tablet 0     fish oil-omega-3 fatty acids (FISH OIL) 1000 MG capsule Take 2 g by mouth daily.       furosemide (LASIX) 20 MG tablet Take 1 tablet (20 mg) by mouth 2 times daily 180 tablet 1     lisinopril (ZESTRIL) 40 MG tablet Take 1 tablet (40 mg) by mouth daily 90 tablet 1     omeprazole (PRILOSEC) 20 MG DR capsule TAKE 1 CAPSULE(20 MG) BY MOUTH DAILY 90 capsule 1     rosuvastatin (CRESTOR) 5 MG tablet Take 1 tablet (5 mg) by mouth daily 90 tablet 1     sennosides (SENOKOT) 8.6 MG tablet Take 2 tablets by mouth daily         Family History   Problem Relation Age of Onset     Cardiovascular Mother         a-fib/living age 87     Gastrointestinal Disease Mother         diverticulosis     Heart Disease Father          age 80-CVA     Cardiovascular Father      Neurologic Disorder Father         parkinsons     Family History Negative Sister         age 59     Family History Negative Brother         age 67     Cancer Son      Colon Cancer No family hx of        Social History      Tobacco Use     Smoking status: Former Smoker     Packs/day: 0.50     Types: Cigarettes     Smokeless tobacco: Former User     Quit date: 9/11/2012     Tobacco comment: Has smoked for over 30 yrs.   Substance Use Topics     Alcohol use: No     If you drink alcohol do you typically have >3 drinks per day or >7 drinks per week? No      Hyperlipidemia Follow-Up      Are you regularly taking any medication or supplement to lower your cholesterol?   Yes- crestor    Are you having muscle aches or other side effects that you think could be caused by your cholesterol lowering medication?  No    Hypertension Follow-up      Do you check your blood pressure regularly outside of the clinic? No     Are you following a low salt diet? Yes    Are your blood pressures ever more than 140 on the top number (systolic) OR more   than 90 on the bottom number (diastolic), for example 140/90? Yes    Chronic Kidney Disease Follow-up    Do you take any over the counter pain medicine?: No      Insomnia follow up; stable on lunesta, requesting refills      Current providers sharing in care for this patient include:   Patient Care Team:  Adri Malcolm MD as PCP - General  Adri Malcolm MD as Assigned PCP    The following health maintenance items are reviewed in Epic and correct as of today:  Health Maintenance Due   Topic Date Due     ANNUAL REVIEW OF HM ORDERS  Never done     MEDICARE ANNUAL WELLNESS VISIT  05/03/2012     MICROALBUMIN  04/04/2013     HEMOGLOBIN  10/30/2020     FALL RISK ASSESSMENT  04/06/2022     Lab work is in process      Pertinent mammograms are reviewed under the imaging tab.    Review of Systems  CONSTITUTIONAL: NEGATIVE for fever, chills, change in weight  EYES: NEGATIVE for vision changes or irritation  ENT/MOUTH: NEGATIVE for ear, mouth and throat problems  RESP: NEGATIVE for significant cough or SOB  BREAST: NEGATIVE for masses, tenderness or discharge  CV: NEGATIVE for chest pain,  "palpitations or peripheral edema  GI: NEGATIVE for nausea, abdominal pain, heartburn, or change in bowel habits  : NEGATIVE for frequency, dysuria, or hematuria  MUSCULOSKELETAL: NEGATIVE for significant arthralgias or myalgia  NEURO: NEGATIVE for weakness, dizziness or paresthesias  ENDOCRINE: NEGATIVE for temperature intolerance, skin/hair changes  HEME: NEGATIVE for bleeding problems  PSYCHIATRIC: NEGATIVE for changes in mood or affect    OBJECTIVE:   BP (!) 158/72   Pulse 75   Temp 97  F (36.1  C) (Axillary)   Resp 12   Ht 1.676 m (5' 6\")   Wt 83 kg (183 lb)   SpO2 96%   BMI 29.54 kg/m   Estimated body mass index is 29.54 kg/m  as calculated from the following:    Height as of this encounter: 1.676 m (5' 6\").    Weight as of this encounter: 83 kg (183 lb).  Physical Exam  GENERAL APPEARANCE: healthy, alert and no distress  EYES: Eyes grossly normal to inspection, PERRL and conjunctivae and sclerae normal  NECK: no adenopathy, no asymmetry, masses, or scars and thyroid normal to palpation  RESP: lungs clear to auscultation - no rales, rhonchi or wheezes  BREAST: normal without masses, tenderness or nipple discharge and no palpable axillary masses or adenopathy  CV: regular rate and rhythm, normal S1 S2,   ABDOMEN: soft, nontender, no hepatosplenomegaly, no masses and bowel sounds normal  MS: no musculoskeletal defects are noted and gait is age appropriate without ataxia  NEURO: Normal strength and tone, sensory exam grossly normal, mentation intact and speech normal  PSYCH: mentation appears normal and affect normal/bright    ASSESSMENT / PLAN:     (Z00.00) Encounter for Medicare annual wellness exam  (primary encounter diagnosis)  Plan: Comprehensive metabolic panel, Hemoglobin            (I10) Essential hypertension, benign  Comment: Elevated blood pressure  Plan: Started on amLODIPine (NORVASC) 2.5 MG tablet as directed.explained clearly about the medication,insructions and side effects.  Continue " "lisinopril (ZESTRIL)  40 MG tablet as directed .explained clearly about the medication,insructions and side effects.  Continue to follow low-sodium diet regular exercise and follow-up in clinic in 4 weeks, check comprehensive metabolic panel,             (R73.03) Prediabetes  Plan: Hemoglobin A1c            (E78.5) Hyperlipidemia LDL goal <130  Plan: rosuvastatin (CRESTOR) 5 MG tablet refilled as directed            (R60.9) Edema, unspecified type  Plan: Stable, refilled furosemide (LASIX) 20 MG tablet as directed.explained clearly about the medication,insructions and side effects.     (N18.31) Stage 3a chronic kidney disease (H)  Plan: Last creatinine 1.17, monitor creatinine, avoid nephrotoxins     Patient has been advised of split billing requirements and indicates understanding: Yes    COUNSELING:  Reviewed preventive health counseling, as reflected in patient instructions       Regular exercise       Healthy diet/nutrition    Estimated body mass index is 29.54 kg/m  as calculated from the following:    Height as of this encounter: 1.676 m (5' 6\").    Weight as of this encounter: 83 kg (183 lb).        She reports that she has quit smoking. Her smoking use included cigarettes. She smoked 0.50 packs per day. She quit smokeless tobacco use about 9 years ago.      Appropriate preventive services were discussed with this patient, including applicable screening as appropriate for cardiovascular disease, diabetes, osteopenia/osteoporosis, and glaucoma.  As appropriate for age/gender, discussed screening for colorectal cancer, prostate cancer, breast cancer, and cervical cancer. Checklist reviewing preventive services available has been given to the patient.    Reviewed patients plan of care and provided an AVS. The Basic Care Plan (routine screening as documented in Health Maintenance) for Paris meets the Care Plan requirement. This Care Plan has been established and reviewed with the Patient.    Counseling " Resources:  ATP IV Guidelines  Pooled Cohorts Equation Calculator  Breast Cancer Risk Calculator  Breast Cancer: Medication to Reduce Risk  FRAX Risk Assessment  ICSI Preventive Guidelines  Dietary Guidelines for Americans, 2010  USDA's MyPlate  ASA Prophylaxis  Lung CA Screening    Adri Malcolm MD  [facility]    Identified Health Risks:

## 2022-04-07 LAB
ALBUMIN SERPL-MCNC: 3.9 G/DL (ref 3.4–5)
ALP SERPL-CCNC: 66 U/L (ref 40–150)
ALT SERPL W P-5'-P-CCNC: 28 U/L (ref 0–50)
ANION GAP SERPL CALCULATED.3IONS-SCNC: 6 MMOL/L (ref 3–14)
AST SERPL W P-5'-P-CCNC: 18 U/L (ref 0–45)
BILIRUB SERPL-MCNC: 1.1 MG/DL (ref 0.2–1.3)
BUN SERPL-MCNC: 26 MG/DL (ref 7–30)
CALCIUM SERPL-MCNC: 10.3 MG/DL (ref 8.5–10.1)
CHLORIDE BLD-SCNC: 107 MMOL/L (ref 94–109)
CO2 SERPL-SCNC: 26 MMOL/L (ref 20–32)
CREAT SERPL-MCNC: 1.04 MG/DL (ref 0.52–1.04)
GFR SERPL CREATININE-BSD FRML MDRD: 53 ML/MIN/1.73M2
GLUCOSE BLD-MCNC: 101 MG/DL (ref 70–99)
POTASSIUM BLD-SCNC: 4.5 MMOL/L (ref 3.4–5.3)
PROT SERPL-MCNC: 7.5 G/DL (ref 6.8–8.8)
SODIUM SERPL-SCNC: 139 MMOL/L (ref 133–144)

## 2022-05-11 ENCOUNTER — OFFICE VISIT (OUTPATIENT)
Dept: INTERNAL MEDICINE | Facility: CLINIC | Age: 82
End: 2022-05-11
Payer: MEDICARE

## 2022-05-11 VITALS
SYSTOLIC BLOOD PRESSURE: 139 MMHG | DIASTOLIC BLOOD PRESSURE: 75 MMHG | OXYGEN SATURATION: 96 % | HEIGHT: 66 IN | TEMPERATURE: 97 F | BODY MASS INDEX: 29.25 KG/M2 | HEART RATE: 90 BPM | RESPIRATION RATE: 17 BRPM | WEIGHT: 182 LBS

## 2022-05-11 DIAGNOSIS — I10 ESSENTIAL HYPERTENSION, BENIGN: ICD-10-CM

## 2022-05-11 DIAGNOSIS — Z20.822 SUSPECTED 2019 NOVEL CORONAVIRUS INFECTION: Primary | ICD-10-CM

## 2022-05-11 PROCEDURE — U0005 INFEC AGEN DETEC AMPLI PROBE: HCPCS | Performed by: INTERNAL MEDICINE

## 2022-05-11 PROCEDURE — 99213 OFFICE O/P EST LOW 20 MIN: CPT | Mod: CS | Performed by: INTERNAL MEDICINE

## 2022-05-11 PROCEDURE — U0003 INFECTIOUS AGENT DETECTION BY NUCLEIC ACID (DNA OR RNA); SEVERE ACUTE RESPIRATORY SYNDROME CORONAVIRUS 2 (SARS-COV-2) (CORONAVIRUS DISEASE [COVID-19]), AMPLIFIED PROBE TECHNIQUE, MAKING USE OF HIGH THROUGHPUT TECHNOLOGIES AS DESCRIBED BY CMS-2020-01-R: HCPCS | Performed by: INTERNAL MEDICINE

## 2022-05-11 RX ORDER — AMLODIPINE BESYLATE 2.5 MG/1
2.5 TABLET ORAL DAILY
Qty: 90 TABLET | Refills: 1 | Status: SHIPPED | OUTPATIENT
Start: 2022-05-11 | End: 2022-10-11 | Stop reason: DRUGHIGH

## 2022-05-11 NOTE — NURSING NOTE
"BP (!) 146/77   Pulse 90   Temp 97  F (36.1  C) (Tympanic)   Resp 17   Ht 1.676 m (5' 6\")   Wt 82.6 kg (182 lb)   SpO2 96%   BMI 29.38 kg/m    Patient in for medication recheck.  "

## 2022-05-11 NOTE — PROGRESS NOTES
Assessment & Plan        (I10) Essential hypertension, benign  Comment: Blood pressure is improved  Plan: Refill amLODIPine (NORVASC) 2.5 MG tablet as directed and continue lisinopril 40 mg daily, continue to follow low-sodium diet regular exercise follow-up in clinic in 10/22           (Z20.822) Suspected 2019 novel coronavirus infection   Plan: Symptomatic; Yes; 5/7/2022 COVID-19 Virus         (Coronavirus) by PCR Nose patient was advised to quarantine until COVID-19 test results are back         Review of the result(s) of each unique test - COVID-19 test  Prescription drug management       Return in about 5 months (around 10/3/2022) for BP Recheck.    Adri Malcolm MD  [facility] is a 82 year old who presents for the following health issues     HPI     Hypertension Follow-up      Do you check your blood pressure regularly outside of the clinic? No she was started on amlodipine 2.5 mg at last office visit, tolerating well, also on lisinopril 40 mg daily    Are you following a low salt diet? Yes    Are your blood pressures ever more than 140 on the top number (systolic) OR more   than 90 on the bottom number (diastolic), for example 140/90? Yes     Patient also complains of cold like symptoms going on for about 5 days, has nasal congestion, cough, no fever or chills, patient states she did home COVID test 3 days ago and was negative.  Last COVID booster 11/26/2021        Past Medical History:   Diagnosis Date     Essential hypertension, benign      Mixed hyperlipidemia      Osteoporosis, unspecified      Other seborrheic keratosis     sees DERM     Tobacco use disorder     quit smoking in 09/12       Current Outpatient Medications   Medication Sig Dispense Refill     amLODIPine (NORVASC) 2.5 MG tablet Take 1 tablet (2.5 mg) by mouth daily 90 tablet 1     aspirin 81 MG tablet Take 81 mg by mouth daily       CALCIUM 600 + D 600-200 MG-UNIT OR TABS 1 tid  0  "    eszopiclone (LUNESTA) 1 MG tablet 1 tab by mouth 2-3 times per week as needed for  insomnia 14 tablet 0     fish oil-omega-3 fatty acids 1000 MG capsule Take 2 g by mouth daily.       furosemide (LASIX) 20 MG tablet Take 1 tablet (20 mg) by mouth 2 times daily 180 tablet 1     lisinopril (ZESTRIL) 40 MG tablet Take 1 tablet (40 mg) by mouth daily 90 tablet 1     omeprazole (PRILOSEC) 20 MG DR capsule TAKE 1 CAPSULE(20 MG) BY MOUTH DAILY 90 capsule 1     rosuvastatin (CRESTOR) 5 MG tablet Take 1 tablet (5 mg) by mouth daily 90 tablet 1     sennosides (SENOKOT) 8.6 MG tablet Take 2 tablets by mouth daily         Review of Systems   CONSTITUTIONAL: NEGATIVE for fever, chills, change in weight  ENT/MOUTH: Nasal congestion and rhinorrhea-clear  RESP:cough-non productive, no shortness of breath  CV: NEGATIVE for chest pain, palpitations or peripheral edema  MUSCULOSKELETAL: NEGATIVE for significant arthralgias or myalgia      Objective    /75   Pulse 90   Temp 97  F (36.1  C) (Tympanic)   Resp 17   Ht 1.676 m (5' 6\")   Wt 82.6 kg (182 lb)   SpO2 96%   BMI 29.38 kg/m    Body mass index is 29.38 kg/m .  Physical Exam   GENERAL: healthy, alert and no distress  RESP: lungs clear to auscultation - no rales, rhonchi or wheezes  CV: regular rate and rhythm, normal S1 S2,   MS: no gross musculoskeletal defects noted, no edema  PSYCH: mentation appears normal, affect normal/bright       "

## 2022-05-12 LAB — SARS-COV-2 RNA RESP QL NAA+PROBE: NEGATIVE

## 2022-06-20 DIAGNOSIS — K21.9 GASTROESOPHAGEAL REFLUX DISEASE, UNSPECIFIED WHETHER ESOPHAGITIS PRESENT: ICD-10-CM

## 2022-06-22 NOTE — TELEPHONE ENCOUNTER
Pending Prescriptions:                       Disp   Refills    omeprazole (PRILOSEC) 20 MG DR capsule     90 cap*1        Sig: Take 1 capsule (20 mg) by mouth daily    Routing refill request to provider for review/approval because:  Needs provider review

## 2022-10-11 ENCOUNTER — OFFICE VISIT (OUTPATIENT)
Dept: INTERNAL MEDICINE | Facility: CLINIC | Age: 82
End: 2022-10-11
Payer: MEDICARE

## 2022-10-11 VITALS
HEART RATE: 82 BPM | TEMPERATURE: 96.8 F | WEIGHT: 182 LBS | DIASTOLIC BLOOD PRESSURE: 69 MMHG | OXYGEN SATURATION: 95 % | HEIGHT: 66 IN | BODY MASS INDEX: 29.25 KG/M2 | SYSTOLIC BLOOD PRESSURE: 154 MMHG | RESPIRATION RATE: 12 BRPM

## 2022-10-11 DIAGNOSIS — R73.03 PREDIABETES: ICD-10-CM

## 2022-10-11 DIAGNOSIS — Z78.0 ASYMPTOMATIC POSTMENOPAUSAL STATUS: ICD-10-CM

## 2022-10-11 DIAGNOSIS — Z23 NEED FOR PROPHYLACTIC VACCINATION AND INOCULATION AGAINST INFLUENZA: ICD-10-CM

## 2022-10-11 DIAGNOSIS — G47.00 INSOMNIA, UNSPECIFIED TYPE: ICD-10-CM

## 2022-10-11 DIAGNOSIS — E78.5 HYPERLIPIDEMIA LDL GOAL <130: ICD-10-CM

## 2022-10-11 DIAGNOSIS — M81.0 OSTEOPOROSIS, UNSPECIFIED OSTEOPOROSIS TYPE, UNSPECIFIED PATHOLOGICAL FRACTURE PRESENCE: ICD-10-CM

## 2022-10-11 DIAGNOSIS — I10 ESSENTIAL HYPERTENSION, BENIGN: Primary | ICD-10-CM

## 2022-10-11 LAB — HBA1C MFR BLD: 6.3 % (ref 0–5.6)

## 2022-10-11 PROCEDURE — 80053 COMPREHEN METABOLIC PANEL: CPT | Performed by: INTERNAL MEDICINE

## 2022-10-11 PROCEDURE — G0008 ADMIN INFLUENZA VIRUS VAC: HCPCS | Performed by: INTERNAL MEDICINE

## 2022-10-11 PROCEDURE — 80061 LIPID PANEL: CPT | Performed by: INTERNAL MEDICINE

## 2022-10-11 PROCEDURE — 36415 COLL VENOUS BLD VENIPUNCTURE: CPT | Performed by: INTERNAL MEDICINE

## 2022-10-11 PROCEDURE — 83036 HEMOGLOBIN GLYCOSYLATED A1C: CPT | Performed by: INTERNAL MEDICINE

## 2022-10-11 PROCEDURE — 90662 IIV NO PRSV INCREASED AG IM: CPT | Performed by: INTERNAL MEDICINE

## 2022-10-11 PROCEDURE — 99214 OFFICE O/P EST MOD 30 MIN: CPT | Mod: 25 | Performed by: INTERNAL MEDICINE

## 2022-10-11 RX ORDER — ESZOPICLONE 1 MG/1
TABLET, FILM COATED ORAL
Qty: 12 TABLET | Refills: 0 | Status: SHIPPED | OUTPATIENT
Start: 2022-10-11 | End: 2023-01-10

## 2022-10-11 RX ORDER — ROSUVASTATIN CALCIUM 5 MG/1
5 TABLET, COATED ORAL DAILY
Qty: 90 TABLET | Refills: 1 | Status: SHIPPED | OUTPATIENT
Start: 2022-10-11 | End: 2023-04-12

## 2022-10-11 RX ORDER — LISINOPRIL 40 MG/1
40 TABLET ORAL DAILY
Qty: 90 TABLET | Refills: 1 | Status: SHIPPED | OUTPATIENT
Start: 2022-10-11 | End: 2023-04-12

## 2022-10-11 RX ORDER — AMLODIPINE BESYLATE 5 MG/1
5 TABLET ORAL DAILY
Qty: 90 TABLET | Refills: 0 | Status: SHIPPED | OUTPATIENT
Start: 2022-10-11 | End: 2022-11-23

## 2022-10-11 ASSESSMENT — ENCOUNTER SYMPTOMS
HEMATURIA: 0
COUGH: 0
FEVER: 0
BREAST MASS: 0
PARESTHESIAS: 0
HEMATOCHEZIA: 0
WEAKNESS: 0
MYALGIAS: 0
SORE THROAT: 0
DIZZINESS: 0
SHORTNESS OF BREATH: 0
HEADACHES: 0
ABDOMINAL PAIN: 0
HEARTBURN: 0
CONSTIPATION: 0
EYE PAIN: 0
CHILLS: 0
NERVOUS/ANXIOUS: 0
NAUSEA: 0
PALPITATIONS: 0
DYSURIA: 0
FREQUENCY: 0
JOINT SWELLING: 0
ARTHRALGIAS: 0
DIARRHEA: 0

## 2022-10-11 ASSESSMENT — PAIN SCALES - GENERAL: PAINLEVEL: NO PAIN (0)

## 2022-10-11 ASSESSMENT — ACTIVITIES OF DAILY LIVING (ADL): CURRENT_FUNCTION: NO ASSISTANCE NEEDED

## 2022-10-11 NOTE — PROGRESS NOTES
"Increase noravasc to 5 mg   Assessment & Plan     (I10) Essential hypertension, benign  (primary encounter diagnosis)  Comment: Elevated systolic blood pressure  Plan: Increase amLODIPine (NORVASC) to 5 MG tablet daily as directed.explained clearly about the medication,insructions and side effects.  Refill, lisinopril (ZESTRIL) 40 MG tablet.explained clearly about the medication,insructions and side effects.  Continue to follow low-sodium diet regular exercise follow-up in clinic in 4 to 6 weeks.  Check Comprehensive metabolic panel,    (R73.03) Prediabetes  Plan: Hemoglobin A1c            (E78.5) Hyperlipidemia LDL goal <130  Plan: Lipid panel reflex to direct LDL Fasting, refilled rosuvastatin (CRESTOR) 5 MG tablet.explained clearly about the medication,insructions and side effects.            (Z78.0) Asymptomatic postmenopausal status  (M81.0) Osteoporosis, unspecified osteoporosis type, unspecified pathological fracture presence  Plan: DX Hip/Pelvis/Spine         (G47.00) Insomnia, unspecified type  Plan: Takes Lunesta 1 mg once a week as needed, refilled Eszopiclone (LUNESTA) 1 MG tablet refilled as directed.explained clearly about the medication,insructions and side effects.  Advised not to drive or operate any machinery while on this med       Review of the result(s) of each unique test - Lipid panel, [facility]  Prescription drug management   956}     BMI:   Estimated body mass index is 29.38 kg/m  as calculated from the following:    Height as of this encounter: 1.676 m (5' 6\").    Weight as of this encounter: 82.6 kg (182 lb).         Return in about 4 weeks (around 11/8/2022) for BP Recheck.    Adri Malcolm MD  [facility] 82 year [facility] presenting for the following health issues:  Lipids and Hypertension     Hyperlipidemia Follow-Up      Are you regularly taking any medication or supplement to lower your cholesterol?   Yes- crestor    Are you having " muscle aches or other side effects that you think could be caused by your cholesterol lowering medication?  No    Hypertension Follow-up       Do you check your blood pressure regularly outside of the clinic? No     Are you following a low salt diet? Yes    Are your blood pressures ever more than 140 on the top number (systolic) OR more   than 90 on the bottom number (diastolic), for example 140/90? Yes    Chronic Kidney Disease Follow-ak1746}    Do you take any over the counter pain medicine?: No    Insomnia follow-up; on Lunesta 1 mg once a week as needed for insomnia with good symptom relief requesting refills    Osteoporosis follow-up; stopped Binova in 2014 , last DEXA 2019        Past Medical History:   Diagnosis Date     Essential hypertension, benign      Mixed hyperlipidemia      Osteoporosis, unspecified      Other seborrheic keratosis     sees DERM     Tobacco use disorder     quit smoking in 09/12       Current Outpatient Medications   Medication Sig Dispense Refill     amLODIPine (NORVASC) 5 MG tablet Take 1 tablet (5 mg) by mouth daily 90 tablet 0     aspirin 81 MG tablet Take 81 mg by mouth daily       CALCIUM 600 + D 600-200 MG-UNIT OR TABS 1 tid  0     eszopiclone (LUNESTA) 1 MG tablet TAKE 1 TABLET BY MOUTH once EVERY WEEK AS NEEDED FOR INSOMNIA 12 tablet 0     fish oil-omega-3 fatty acids 1000 MG capsule Take 2 g by mouth daily.       furosemide (LASIX) 20 MG tablet TAKE 1 TABLET(20 MG) BY MOUTH TWICE DAILY 180 tablet 0     lisinopril (ZESTRIL) 40 MG tablet Take 1 tablet (40 mg) by mouth daily 90 tablet 1     omeprazole (PRILOSEC) 20 MG DR capsule Take 1 capsule (20 mg) by mouth daily 90 capsule 0     rosuvastatin (CRESTOR) 5 MG tablet Take 1 tablet (5 mg) by mouth daily 90 tablet 1     sennosides (SENOKOT) 8.6 MG tablet Take 2 tablets by mouth daily            Review of Systems   Constitutional: Negative for chills and fever.   HENT: Negative for congestion, ear pain, hearing loss and sore  "throat.    Eyes: Negative for pain and visual disturbance.   Respiratory: Negative for cough and shortness of breath.    Cardiovascular: Negative for chest pain, palpitations and peripheral edema.   Gastrointestinal: Negative for abdominal pain, constipation, diarrhea, heartburn, hematochezia and nausea.   Breasts:  Negative for tenderness, breast mass and discharge.   Genitourinary: Negative for dysuria, frequency, genital sores, hematuria, pelvic pain, urgency, vaginal bleeding and vaginal discharge.   Musculoskeletal: Negative for arthralgias, joint swelling and myalgias.   Skin: Negative for rash.   Neurological: Negative for dizziness, weakness, headaches and paresthesias.   Psychiatric/Behavioral: Negative for mood changes. The patient is not nervous/anxious.           Objective    BP (!) 154/69   Pulse 82   Temp 96.8  F (36  C) (Tympanic)   Resp 12   Ht 1.676 m (5' 6\")   Wt 82.6 kg (182 lb)   SpO2 95%   BMI 29.38 kg/m    Body mass index is 29.38 kg/m .  Physical Exam   GENERAL:  alert and no distress  EYES: Eyes grossly normal to inspection, PERRL and conjunctivae and sclerae normal  RESP: lungs clear to auscultation - no rales, rhonchi or wheezes  CV: regular rate and rhythm, normal S1 S2,    MS: trace edema, no calf tenderness jayashree   NEURO: Normal strength and tone, mentation intact and speech normal  PSYCH: mentation appears normal, affect normal/bright          "

## 2022-10-11 NOTE — NURSING NOTE
"BP (!) 148/86   Pulse 82   Temp 96.8  F (36  C) (Tympanic)   Resp 12   Ht 1.676 m (5' 6\")   Wt 82.6 kg (182 lb)   SpO2 95%   BMI 29.38 kg/m    Patient in for medication follow up.  "

## 2022-10-12 LAB
ALBUMIN SERPL-MCNC: 3.9 G/DL (ref 3.4–5)
ALP SERPL-CCNC: 77 U/L (ref 40–150)
ALT SERPL W P-5'-P-CCNC: 20 U/L (ref 0–50)
ANION GAP SERPL CALCULATED.3IONS-SCNC: 2 MMOL/L (ref 3–14)
AST SERPL W P-5'-P-CCNC: 14 U/L (ref 0–45)
BILIRUB SERPL-MCNC: 0.9 MG/DL (ref 0.2–1.3)
BUN SERPL-MCNC: 17 MG/DL (ref 7–30)
CALCIUM SERPL-MCNC: 9.8 MG/DL (ref 8.5–10.1)
CHLORIDE BLD-SCNC: 107 MMOL/L (ref 94–109)
CHOLEST SERPL-MCNC: 143 MG/DL
CO2 SERPL-SCNC: 30 MMOL/L (ref 20–32)
CREAT SERPL-MCNC: 1.01 MG/DL (ref 0.52–1.04)
FASTING STATUS PATIENT QL REPORTED: YES
GFR SERPL CREATININE-BSD FRML MDRD: 55 ML/MIN/1.73M2
GLUCOSE BLD-MCNC: 112 MG/DL (ref 70–99)
HDLC SERPL-MCNC: 60 MG/DL
LDLC SERPL CALC-MCNC: 52 MG/DL
NONHDLC SERPL-MCNC: 83 MG/DL
POTASSIUM BLD-SCNC: 4.9 MMOL/L (ref 3.4–5.3)
PROT SERPL-MCNC: 7.6 G/DL (ref 6.8–8.8)
SODIUM SERPL-SCNC: 139 MMOL/L (ref 133–144)
TRIGL SERPL-MCNC: 153 MG/DL

## 2022-11-23 ENCOUNTER — ANCILLARY PROCEDURE (OUTPATIENT)
Dept: BONE DENSITY | Facility: CLINIC | Age: 82
End: 2022-11-23
Attending: INTERNAL MEDICINE
Payer: MEDICARE

## 2022-11-23 ENCOUNTER — OFFICE VISIT (OUTPATIENT)
Dept: INTERNAL MEDICINE | Facility: CLINIC | Age: 82
End: 2022-11-23
Payer: MEDICARE

## 2022-11-23 VITALS
BODY MASS INDEX: 29.09 KG/M2 | DIASTOLIC BLOOD PRESSURE: 66 MMHG | TEMPERATURE: 96.6 F | RESPIRATION RATE: 14 BRPM | WEIGHT: 181 LBS | HEART RATE: 91 BPM | SYSTOLIC BLOOD PRESSURE: 124 MMHG | OXYGEN SATURATION: 100 % | HEIGHT: 66 IN

## 2022-11-23 DIAGNOSIS — I10 ESSENTIAL HYPERTENSION, BENIGN: Primary | ICD-10-CM

## 2022-11-23 DIAGNOSIS — Z78.0 ASYMPTOMATIC POSTMENOPAUSAL STATUS: ICD-10-CM

## 2022-11-23 DIAGNOSIS — M81.0 OSTEOPOROSIS, UNSPECIFIED OSTEOPOROSIS TYPE, UNSPECIFIED PATHOLOGICAL FRACTURE PRESENCE: ICD-10-CM

## 2022-11-23 PROCEDURE — 99213 OFFICE O/P EST LOW 20 MIN: CPT | Performed by: INTERNAL MEDICINE

## 2022-11-23 PROCEDURE — 77085 DXA BONE DENSITY AXL VRT FX: CPT | Performed by: INTERNAL MEDICINE

## 2022-11-23 RX ORDER — AMLODIPINE BESYLATE 5 MG/1
5 TABLET ORAL DAILY
Qty: 90 TABLET | Refills: 1 | Status: SHIPPED | OUTPATIENT
Start: 2022-11-23 | End: 2023-04-12

## 2022-11-23 NOTE — NURSING NOTE
"/66   Pulse 91   Temp (!) 96.6  F (35.9  C) (Tympanic)   Resp 14   Ht 1.676 m (5' 6\")   Wt 82.1 kg (181 lb)   SpO2 100%   BMI 29.21 kg/m    Patient in for medication follow up.  "

## 2022-11-23 NOTE — PROGRESS NOTES
Assessment & Plan     (I10) Essential hypertension, benign  (primary encounter diagnosis)  Comment: Blood pressure well controlled at this time  Plan: Continue on refilled amLODIPine (NORVASC) 5 MG tablet 1 tablet daily as directed.explained clearly about the medication,insructions and side effects.  Continue lisinopril 40 mg daily.explained clearly about the medication,insructions and side effects.  Continue to follow low-sodium diet regular exercise follow-up in clinic in 5 months            Prescription drug management       Return in about 20 weeks (around 4/12/2023) for BP Recheck.    Adri Malcolm MD  [facility] JOSH Akins is a 82 year old presenting for the following health issues:  Lipids and Hypertension      HPI        Hypertension Follow-up      Do you check your blood pressure regularly outside of the clinic? Yes 124/60, amlodipine was increased to 5 mg daily at last office visit, tolerating well.    Are you following a low salt diet? Yes    Are your blood pressures ever more than 140 on the top number (systolic) OR more   than 90 on the bottom number (diastolic), for example 140/90? No    Insomnia; currently on Lunesta 1mg - one  tablet once a week and patient would like to increase at least 6 tablets a month.        How many servings of fruits and vegetables do you eat daily?  2-3    On average, how many sweetened beverages do you drink each day (Examples: soda, juice, sweet tea, etc.  Do NOT count diet or artificially sweetened beverages)?   1    How many days per week do you exercise enough to make your heart beat faster? 3 or less    How many minutes a day do you exercise enough to make your heart beat faster? 9 or less    How many days per week do you miss taking your medication? 0    Past Medical History:   Diagnosis Date     Essential hypertension, benign      Mixed hyperlipidemia      Osteoporosis, unspecified      Other seborrheic keratosis      "sees DERM     Tobacco use disorder     quit smoking in 09/12       Current Outpatient Medications   Medication Sig Dispense Refill     amLODIPine (NORVASC) 5 MG tablet Take 1 tablet (5 mg) by mouth daily 90 tablet 1     aspirin 81 MG tablet Take 81 mg by mouth daily       CALCIUM 600 + D 600-200 MG-UNIT OR TABS 1 tid  0     eszopiclone (LUNESTA) 1 MG tablet TAKE 1 TABLET BY MOUTH once EVERY WEEK AS NEEDED FOR INSOMNIA 12 tablet 0     fish oil-omega-3 fatty acids 1000 MG capsule Take 2 g by mouth daily.       furosemide (LASIX) 20 MG tablet TAKE 1 TABLET(20 MG) BY MOUTH TWICE DAILY 180 tablet 0     lisinopril (ZESTRIL) 40 MG tablet Take 1 tablet (40 mg) by mouth daily 90 tablet 1     omeprazole (PRILOSEC) 20 MG DR capsule Take 1 capsule (20 mg) by mouth daily 90 capsule 0     rosuvastatin (CRESTOR) 5 MG tablet Take 1 tablet (5 mg) by mouth daily 90 tablet 1     sennosides (SENOKOT) 8.6 MG tablet Take 2 tablets by mouth daily           Review of Systems   CONSTITUTIONAL: NEGATIVE for fever, chills, change in weight  RESP: NEGATIVE for significant cough or SOB  CV: NEGATIVE for chest pain, palpitations or peripheral edema      Objective    /66   Pulse 91   Temp (!) 96.6  F (35.9  C) (Tympanic)   Resp 14   Ht 1.676 m (5' 6\")   Wt 82.1 kg (181 lb)   SpO2 100%   BMI 29.21 kg/m    Body mass index is 29.21 kg/m .  Physical Exam   GENERAL: healthy, alert and no distress  RESP: lungs clear to auscultation - no rales, rhonchi or wheezes  CV: regular rate and rhythm, normal S1 S2,   MS: trace ankle edema, no calf tenderness jayashree              "

## 2023-04-12 ENCOUNTER — OFFICE VISIT (OUTPATIENT)
Dept: INTERNAL MEDICINE | Facility: CLINIC | Age: 83
End: 2023-04-12
Payer: MEDICARE

## 2023-04-12 ENCOUNTER — HOSPITAL ENCOUNTER (OUTPATIENT)
Dept: MAMMOGRAPHY | Facility: CLINIC | Age: 83
Discharge: HOME | End: 2023-04-12
Attending: INTERNAL MEDICINE | Admitting: INTERNAL MEDICINE
Payer: MEDICARE

## 2023-04-12 VITALS
HEART RATE: 64 BPM | RESPIRATION RATE: 13 BRPM | BODY MASS INDEX: 29.23 KG/M2 | WEIGHT: 181.9 LBS | HEIGHT: 66 IN | TEMPERATURE: 97.8 F | OXYGEN SATURATION: 100 % | SYSTOLIC BLOOD PRESSURE: 124 MMHG | DIASTOLIC BLOOD PRESSURE: 70 MMHG

## 2023-04-12 DIAGNOSIS — R73.03 PREDIABETES: ICD-10-CM

## 2023-04-12 DIAGNOSIS — N18.31 STAGE 3A CHRONIC KIDNEY DISEASE (H): ICD-10-CM

## 2023-04-12 DIAGNOSIS — G47.00 INSOMNIA, UNSPECIFIED TYPE: ICD-10-CM

## 2023-04-12 DIAGNOSIS — I10 ESSENTIAL HYPERTENSION, BENIGN: ICD-10-CM

## 2023-04-12 DIAGNOSIS — Z12.31 VISIT FOR SCREENING MAMMOGRAM: ICD-10-CM

## 2023-04-12 DIAGNOSIS — E78.5 HYPERLIPIDEMIA LDL GOAL <130: ICD-10-CM

## 2023-04-12 DIAGNOSIS — E83.52 SERUM CALCIUM ELEVATED: Primary | ICD-10-CM

## 2023-04-12 DIAGNOSIS — R60.9 EDEMA, UNSPECIFIED TYPE: ICD-10-CM

## 2023-04-12 LAB
ANION GAP SERPL CALCULATED.3IONS-SCNC: 14 MMOL/L (ref 7–15)
BUN SERPL-MCNC: 20.7 MG/DL (ref 8–23)
CALCIUM SERPL-MCNC: 10.4 MG/DL (ref 8.8–10.2)
CHLORIDE SERPL-SCNC: 103 MMOL/L (ref 98–107)
CREAT SERPL-MCNC: 1.02 MG/DL (ref 0.51–0.95)
DEPRECATED HCO3 PLAS-SCNC: 23 MMOL/L (ref 22–29)
GFR SERPL CREATININE-BSD FRML MDRD: 54 ML/MIN/1.73M2
GLUCOSE SERPL-MCNC: 111 MG/DL (ref 70–99)
HBA1C MFR BLD: 6.4 % (ref 0–5.6)
POTASSIUM SERPL-SCNC: 5.1 MMOL/L (ref 3.4–5.3)
SODIUM SERPL-SCNC: 140 MMOL/L (ref 136–145)

## 2023-04-12 PROCEDURE — 80048 BASIC METABOLIC PNL TOTAL CA: CPT | Performed by: INTERNAL MEDICINE

## 2023-04-12 PROCEDURE — 99214 OFFICE O/P EST MOD 30 MIN: CPT | Performed by: INTERNAL MEDICINE

## 2023-04-12 PROCEDURE — 36415 COLL VENOUS BLD VENIPUNCTURE: CPT | Performed by: INTERNAL MEDICINE

## 2023-04-12 PROCEDURE — 77067 SCR MAMMO BI INCL CAD: CPT

## 2023-04-12 PROCEDURE — 83036 HEMOGLOBIN GLYCOSYLATED A1C: CPT | Performed by: INTERNAL MEDICINE

## 2023-04-12 RX ORDER — LISINOPRIL 40 MG/1
40 TABLET ORAL DAILY
Qty: 90 TABLET | Refills: 1 | Status: SHIPPED | OUTPATIENT
Start: 2023-04-12 | End: 2023-09-26

## 2023-04-12 RX ORDER — AMLODIPINE BESYLATE 5 MG/1
5 TABLET ORAL DAILY
Qty: 90 TABLET | Refills: 1 | Status: SHIPPED | OUTPATIENT
Start: 2023-04-12 | End: 2023-09-26

## 2023-04-12 RX ORDER — ESZOPICLONE 1 MG/1
TABLET, FILM COATED ORAL
Qty: 18 TABLET | Refills: 0 | Status: SHIPPED | OUTPATIENT
Start: 2023-04-12 | End: 2023-07-13

## 2023-04-12 RX ORDER — ROSUVASTATIN CALCIUM 5 MG/1
5 TABLET, COATED ORAL DAILY
Qty: 90 TABLET | Refills: 1 | Status: SHIPPED | OUTPATIENT
Start: 2023-04-12 | End: 2023-09-26

## 2023-04-12 RX ORDER — FUROSEMIDE 20 MG
20 TABLET ORAL 2 TIMES DAILY
Qty: 180 TABLET | Refills: 1 | Status: SHIPPED | OUTPATIENT
Start: 2023-04-12 | End: 2023-09-26

## 2023-04-12 NOTE — NURSING NOTE
"/70   Pulse 64   Temp 97.8  F (36.6  C) (Oral)   Resp 13   Ht 1.676 m (5' 6\")   Wt 82.5 kg (181 lb 14.4 oz)   SpO2 100%   BMI 29.36 kg/m      "

## 2023-04-12 NOTE — PROGRESS NOTES
"  Assessment & Plan      (I10) Essential hypertension, benign  Comment: Blood pressure stable  Plan: Basic metabolic panel, amLODIPine (NORVASC) 5         MG tablet, lisinopril (ZESTRIL) 40 MG tablet refilled as directed.explained clearly about the medication,insructions and side effects.  Advised to follow low salt diet and exercise and f/u in 6 mths.        (E78.5) Hyperlipidemia LDL goal <130  Plan: rosuvastatin (CRESTOR) 5 MG tablet refilled as directed.explained clearly about the medication,insructions and side effects.            (G47.00) Insomnia, unspecified type  Comment: Stable  Plan: eszopiclone (LUNESTA) 1 MG tablet refilled as directed.explained clearly about the medication,insructions and side effects.  Advised not to drive or operate any machinery while on this med       (R60.9) Edema, unspecified type  Comment: Stable  Plan: furosemide (LASIX) 20 MG tablet refilled as directed.explained clearly about the medication,insructions and side effects.      (R73.03) Prediabetes  Plan: Hemoglobin A1c             (N18.31) Stage 3a chronic kidney disease (H)  Plan: Monitor creatinine and avoid nephrotoxins      Review of the result(s) of each unique test - BMP, A1c  Prescription drug management        BMI:   Estimated body mass index is 29.36 kg/m  as calculated from the following:    Height as of this encounter: 1.676 m (5' 6\").    Weight as of this encounter: 82.5 kg (181 lb 14.4 oz).         Adri Malcolm MD  [facility] 83 year [facility], presenting for the following health issues:  Lipids and Hypertension         View : No data to display.              HPI     Hyperlipidemia Follow-Up      Are you regularly taking any medication or supplement to lower your cholesterol?   Yes- statin    Are you having muscle aches or other side effects that you think could be caused by your cholesterol lowering medication?  No    Hypertension Follow-up      Do you " check your blood pressure regularly outside of the clinic? Yes     Are you following a low salt diet? Yes    Are your blood pressures ever more than 140 on the top number (systolic) OR more   than 90 on the bottom number (diastolic), for example 140/90? No      Chronic Kidney Disease Follow-up    Do you take any over the counter pain medicine?: No      Prediabetes follow-up: Last A1c 6.3    Insomnia follow-up; stable on Lunesta 1 mg to take as needed, requesting refills        How many servings of fruits and vegetables do you eat daily?  2-3    On average, how many sweetened beverages do you drink each day (Examples: soda, juice, sweet tea, etc.  Do NOT count diet or artificially sweetened beverages)?   2    How many days per week do you exercise enough to make your heart beat faster? 3 or less    How many minutes a day do you exercise enough to make your heart beat faster? 30 - 60    How many days per week do you miss taking your medication? 0      Past Medical History:   Diagnosis Date     Essential hypertension, benign      Mixed hyperlipidemia      Osteoporosis, unspecified      Other seborrheic keratosis     sees DERM     Tobacco use disorder     quit smoking in 09/12       Current Outpatient Medications   Medication Sig Dispense Refill     amLODIPine (NORVASC) 5 MG tablet Take 1 tablet (5 mg) by mouth daily 90 tablet 1     aspirin 81 MG tablet Take 81 mg by mouth daily       CALCIUM 600 + D 600-200 MG-UNIT OR TABS 1 tid  0     eszopiclone (LUNESTA) 1 MG tablet TAKE 1 TABLET BY MOUTH 1-2 TIMES PER WEEK AS NEEDED FOR INSOMNIA 18 tablet 0     fish oil-omega-3 fatty acids 1000 MG capsule Take 2 g by mouth daily.       furosemide (LASIX) 20 MG tablet Take 1 tablet (20 mg) by mouth 2 times daily 180 tablet 1     lisinopril (ZESTRIL) 40 MG tablet Take 1 tablet (40 mg) by mouth daily 90 tablet 1     omeprazole (PRILOSEC) 20 MG DR capsule TAKE 1 CAPSULE(20 MG) BY MOUTH DAILY 90 capsule 3     rosuvastatin (CRESTOR) 5 MG  "tablet Take 1 tablet (5 mg) by mouth daily 90 tablet 1     sennosides (SENOKOT) 8.6 MG tablet Take 2 tablets by mouth daily (Patient not taking: Reported on 4/12/2023)           Review of Systems   CONSTITUTIONAL: NEGATIVE for fever, chills, change in weight  ENT/MOUTH: NEGATIVE for ear, mouth and throat problems  RESP: NEGATIVE for significant cough or SOB  CV: NEGATIVE for chest pain, palpitations or peripheral edema  MUSCULOSKELETAL: NEGATIVE for significant arthralgias or myalgia  NEURO: NEGATIVE for weakness, dizziness or paresthesias  PSYCHIATRIC: NEGATIVE for changes in mood or affect      Objective    /70   Pulse 64   Temp 97.8  F (36.6  C) (Oral)   Resp 13   Ht 1.676 m (5' 6\")   Wt 82.5 kg (181 lb 14.4 oz)   SpO2 100%   BMI 29.36 kg/m    Body mass index is 29.36 kg/m .  Physical Exam   GENERAL:   alert and no distress  EYES: Eyes grossly normal to inspection, PERRL and conjunctivae and sclerae normal  RESP: lungs clear to auscultation - no rales, rhonchi or wheezes  CV: regular rate and rhythm, normal S1 S2,   MS: trace ankle edema, no calf tenderness  NEURO: Normal strength and tone, mentation intact and speech normal  PSYCH: mentation appears normal, affect normal/bright              "

## 2023-04-20 ENCOUNTER — PATIENT OUTREACH (OUTPATIENT)
Dept: CARE COORDINATION | Facility: CLINIC | Age: 83
End: 2023-04-20
Payer: MEDICARE

## 2023-06-01 ENCOUNTER — HEALTH MAINTENANCE LETTER (OUTPATIENT)
Age: 83
End: 2023-06-01

## 2023-07-07 DIAGNOSIS — G47.00 INSOMNIA, UNSPECIFIED TYPE: ICD-10-CM

## 2023-07-13 RX ORDER — ESZOPICLONE 1 MG/1
TABLET, FILM COATED ORAL
Qty: 18 TABLET | Refills: 2 | Status: SHIPPED | OUTPATIENT
Start: 2023-07-13 | End: 2024-01-21

## 2023-09-26 ENCOUNTER — OFFICE VISIT (OUTPATIENT)
Dept: INTERNAL MEDICINE | Facility: CLINIC | Age: 83
End: 2023-09-26
Payer: MEDICARE

## 2023-09-26 VITALS
RESPIRATION RATE: 16 BRPM | TEMPERATURE: 96.9 F | BODY MASS INDEX: 29.25 KG/M2 | SYSTOLIC BLOOD PRESSURE: 124 MMHG | HEART RATE: 80 BPM | WEIGHT: 182 LBS | DIASTOLIC BLOOD PRESSURE: 78 MMHG | OXYGEN SATURATION: 94 % | HEIGHT: 66 IN

## 2023-09-26 DIAGNOSIS — E83.52 SERUM CALCIUM ELEVATED: ICD-10-CM

## 2023-09-26 DIAGNOSIS — Z00.00 ENCOUNTER FOR MEDICARE ANNUAL WELLNESS EXAM: Primary | ICD-10-CM

## 2023-09-26 DIAGNOSIS — R60.9 EDEMA, UNSPECIFIED TYPE: ICD-10-CM

## 2023-09-26 DIAGNOSIS — E78.5 HYPERLIPIDEMIA LDL GOAL <130: ICD-10-CM

## 2023-09-26 DIAGNOSIS — I10 ESSENTIAL HYPERTENSION, BENIGN: ICD-10-CM

## 2023-09-26 DIAGNOSIS — R73.03 PREDIABETES: ICD-10-CM

## 2023-09-26 LAB
HBA1C MFR BLD: 6.7 % (ref 0–5.6)
HGB BLD-MCNC: 12.9 G/DL (ref 11.7–15.7)

## 2023-09-26 PROCEDURE — G0438 PPPS, INITIAL VISIT: HCPCS | Performed by: INTERNAL MEDICINE

## 2023-09-26 PROCEDURE — 80053 COMPREHEN METABOLIC PANEL: CPT | Performed by: INTERNAL MEDICINE

## 2023-09-26 PROCEDURE — 80061 LIPID PANEL: CPT | Performed by: INTERNAL MEDICINE

## 2023-09-26 PROCEDURE — 99214 OFFICE O/P EST MOD 30 MIN: CPT | Mod: 25 | Performed by: INTERNAL MEDICINE

## 2023-09-26 PROCEDURE — G0008 ADMIN INFLUENZA VIRUS VAC: HCPCS | Performed by: INTERNAL MEDICINE

## 2023-09-26 PROCEDURE — 36415 COLL VENOUS BLD VENIPUNCTURE: CPT | Performed by: INTERNAL MEDICINE

## 2023-09-26 PROCEDURE — 83970 ASSAY OF PARATHORMONE: CPT | Performed by: INTERNAL MEDICINE

## 2023-09-26 PROCEDURE — 90662 IIV NO PRSV INCREASED AG IM: CPT | Performed by: INTERNAL MEDICINE

## 2023-09-26 PROCEDURE — 83036 HEMOGLOBIN GLYCOSYLATED A1C: CPT | Performed by: INTERNAL MEDICINE

## 2023-09-26 PROCEDURE — 85018 HEMOGLOBIN: CPT | Performed by: INTERNAL MEDICINE

## 2023-09-26 RX ORDER — FUROSEMIDE 20 MG
20 TABLET ORAL DAILY
Qty: 90 TABLET | Refills: 1 | Status: SHIPPED | OUTPATIENT
Start: 2023-09-26 | End: 2024-04-16

## 2023-09-26 RX ORDER — LISINOPRIL 40 MG/1
40 TABLET ORAL DAILY
Qty: 90 TABLET | Refills: 1 | Status: SHIPPED | OUTPATIENT
Start: 2023-09-26 | End: 2024-02-27

## 2023-09-26 RX ORDER — ROSUVASTATIN CALCIUM 5 MG/1
5 TABLET, COATED ORAL DAILY
Qty: 90 TABLET | Refills: 1 | Status: SHIPPED | OUTPATIENT
Start: 2023-09-26 | End: 2024-02-27

## 2023-09-26 RX ORDER — AMLODIPINE BESYLATE 5 MG/1
5 TABLET ORAL DAILY
Qty: 90 TABLET | Refills: 1 | Status: SHIPPED | OUTPATIENT
Start: 2023-09-26 | End: 2024-02-27

## 2023-09-26 ASSESSMENT — ACTIVITIES OF DAILY LIVING (ADL): CURRENT_FUNCTION: NO ASSISTANCE NEEDED

## 2023-09-26 NOTE — PROGRESS NOTES
"SUBJECTIVE:   Mable is a 83 year old who presents for Preventive Visit.      9/26/2023     9:50 AM   Additional Questions   Roomed by jerry   Accompanied by self         9/26/2023     9:50 AM   Patient Reported Additional Medications   Patient reports taking the following new medications none       Are you in the first 12 months of your Medicare coverage?  No    Healthy Habits:     In general, how would you rate your overall health?  Good    Frequency of exercise:  2-3 days/week    Duration of exercise:  15-30 minutes    Do you usually eat at least 4 servings of fruit and vegetables a day, include whole grains    & fiber and avoid regularly eating high fat or \"junk\" foods?  Yes    Taking medications regularly:  No    Barriers to taking medications:  None    Medication side effects:  None    Ability to successfully perform activities of daily living:  No assistance needed    Home Safety:  No safety concerns identified    Hearing Impairment:  No hearing concerns    In the past 6 months, have you been bothered by leaking of urine?  No    In general, how would you rate your overall mental or emotional health?  Good    Additional concerns today:  No          Have you ever done Advance Care Planning? (For example, a Health Directive, POLST, or a discussion with a medical provider or your loved ones about your wishes): Yes, advance care planning is on file.       Fall risk  Fallen 2 or more times in the past year?: No  Any fall with injury in the past year?: No    Cognitive Screening   1) Repeat 3 items (Leader, Season, Table)    2) Clock draw: NORMAL  3) 3 item recall: Recalls 3 objects  Results: 3 items recalled: COGNITIVE IMPAIRMENT LESS LIKELY    Mini-CogTM Copyright HOANG Webster. Licensed by the author for use in [facility]; reprinted with permission (ana@.[facility]). All rights reserved.      Do you have sleep apnea, excessive snoring or daytime drowsiness? : no    Reviewed and updated as needed this visit by " clinical staff                  Reviewed and updated as needed this visit by Provider                   Past Medical History:   Diagnosis Date     Essential hypertension, benign      Mixed hyperlipidemia      Osteoporosis, unspecified      Other seborrheic keratosis     sees DERM     Tobacco use disorder     quit smoking in          Past Surgical History:   Procedure Laterality Date     COLONOSCOPY       COLONOSCOPY N/A 2015    Procedure: COLONOSCOPY;  Surgeon: Santosh Liao MD;  Location:  GI     HC REMOVAL OF TONSILS,<11 Y/O         Current Outpatient Medications   Medication Sig Dispense Refill     amLODIPine (NORVASC) 5 MG tablet Take 1 tablet (5 mg) by mouth daily 90 tablet 1     aspirin 81 MG tablet Take 81 mg by mouth daily       fish oil-omega-3 fatty acids 1000 MG capsule Take 2 g by mouth daily.       furosemide (LASIX) 20 MG tablet Take 1 tablet (20 mg) by mouth 2 times daily (Patient taking differently: Take 20 mg by mouth daily) 180 tablet 1     lisinopril (ZESTRIL) 40 MG tablet Take 1 tablet (40 mg) by mouth daily 90 tablet 1     omeprazole (PRILOSEC) 20 MG DR capsule TAKE 1 CAPSULE(20 MG) BY MOUTH DAILY 90 capsule 3     rosuvastatin (CRESTOR) 5 MG tablet Take 1 tablet (5 mg) by mouth daily 90 tablet 1     CALCIUM 600 + D 600-200 MG-UNIT OR TABS 1 tid (Patient not taking: Reported on 2023)  0     eszopiclone (LUNESTA) 1 MG tablet TAKE 1 TABLET BY MOUTH 1 TO 2 TIMES EVERY WEEK AS NEEDED FOR INSOMNIA (Patient not taking: Reported on 2023) 18 tablet 2     sennosides (SENOKOT) 8.6 MG tablet Take 2 tablets by mouth daily (Patient not taking: Reported on 2023)         Family History   Problem Relation Age of Onset     Cardiovascular Mother         a-fib/living age 87     Gastrointestinal Disease Mother         diverticulosis     Heart Disease Father          age 80-CVA     Cardiovascular Father      Neurologic Disorder Father         parkinsons     Family History  Negative Sister         age 59     Family History Negative Brother         age 67     Cancer Son      Colon Cancer No family hx of        Social History     Tobacco Use     Smoking status: Former     Packs/day: 0.50     Types: Cigarettes     Smokeless tobacco: Former     Quit date: 9/11/2012     Tobacco comments:     Has smoked for over 30 yrs.   Substance Use Topics     Alcohol use: No             10/11/2022    10:06 AM   Alcohol Use   Prescreen: >3 drinks/day or >7 drinks/week? No       Do you have a current opioid prescription? No  Do you use any other controlled substances or medications that are not prescribed by a provider? None      Hyperlipidemia Follow-Up    Are you regularly taking any medication or supplement to lower your cholesterol?   Yes- crestor  Are you having muscle aches or other side effects that you think could be caused by your cholesterol lowering medication?  No    Hypertension Follow-up    Do you check your blood pressure regularly outside of the clinic? Yes   Are you following a low salt diet? Yes  Are your blood pressures ever more than 140 on the top number (systolic) OR more   than 90 on the bottom number (diastolic), for example 140/90? No    Lower extremity edema follow-up: On Lasix 20 mg taking once a day at this time with good symptom relief    Insomnia follow-up stable on Lunesta as needed      Current providers sharing in care for this patient include:   Patient Care Team:  Adri Malcolm MD as PCP - General  Adri Malcolm MD as Assigned PCP    The following health maintenance items are reviewed in Epic and correct as of today:  Health Maintenance   Topic Date Due     ANNUAL REVIEW OF HM ORDERS  Never done     MICROALBUMIN  04/04/2013     COVID-19 Vaccine (3 - Booster for Dave series) 01/21/2022     MEDICARE ANNUAL WELLNESS VISIT  04/06/2023     INFLUENZA VACCINE (1) 09/01/2023     HEMOGLOBIN  04/06/2023     LIPID  10/11/2023     BMP  04/12/2024     FALL  "RISK ASSESSMENT  09/26/2024     ADVANCE CARE PLANNING  04/06/2027     DTAP/TDAP/TD IMMUNIZATION (2 - Td or Tdap) 04/29/2029     DEXA  11/23/2037     PHQ-2 (once per calendar year)  Completed     Pneumococcal Vaccine: 65+ Years  Completed     URINALYSIS  Completed     ZOSTER IMMUNIZATION  Completed     IPV IMMUNIZATION  Aged Out     HPV IMMUNIZATION  Aged Out     MENINGITIS IMMUNIZATION  Aged Out       Pertinent mammograms are reviewed under the imaging tab.    Review of Systems  CONSTITUTIONAL: NEGATIVE for fever, chills, change in weight  EYES: NEGATIVE for vision changes or irritation  ENT/MOUTH: NEGATIVE for ear, mouth and throat problems  RESP: NEGATIVE for significant cough or SOB  BREAST: NEGATIVE for masses, tenderness or discharge  CV: NEGATIVE for chest pain, palpitations or peripheral edema  GI: NEGATIVE for nausea, abdominal pain, heartburn, or change in bowel habits  : NEGATIVE for frequency, dysuria, or hematuria  MUSCULOSKELETAL: NEGATIVE for significant arthralgias or myalgia  NEURO: NEGATIVE for weakness, dizziness or paresthesias  ENDOCRINE: NEGATIVE for temperature intolerance, skin/hair changes  HEME: NEGATIVE for bleeding problems  PSYCHIATRIC: NEGATIVE for changes in mood or affect    OBJECTIVE:   /78   Pulse 80   Temp 96.9  F (36.1  C) (Tympanic)   Resp 16   Ht 1.676 m (5' 6\")   Wt 82.6 kg (182 lb)   SpO2 94%   BMI 29.38 kg/m   Estimated body mass index is 29.38 kg/m  as calculated from the following:    Height as of this encounter: 1.676 m (5' 6\").    Weight as of this encounter: 82.6 kg (182 lb).  Physical Exam  GENERAL APPEARANCE: healthy, alert and no distress  EYES: Eyes grossly normal to inspection, PERRL and conjunctivae and sclerae normal  HENT: ear canals and TM's normal, nose and mouth without ulcers or lesions, oropharynx clear and oral mucous membranes moist  RESP: lungs clear to auscultation - no rales, rhonchi or wheezes  BREAST: normal without masses, tenderness or " nipple discharge and no palpable axillary masses or adenopathy  CV: regular rate and rhythm, normal S1 S2,   ABDOMEN: soft, nontender, no hepatosplenomegaly, no masses and bowel sounds normal  MS: no musculoskeletal defects are noted and gait is age appropriate without ataxia  NEURO: Normal strength and tone, sensory exam grossly normal, mentation intact and speech normal  PSYCH: mentation appears normal and affect normal/bright    ASSESSMENT / PLAN:     (Z00.00) Encounter for Medicare annual wellness exam  (primary encounter diagnosis)  Plan: Hemoglobin, Comprehensive metabolic panel,         Lipid panel reflex to direct LDL Fasting            (E78.5) Hyperlipidemia LDL goal <130  Plan:, Check lipid panel, refilled rosuvastatin (CRESTOR) 5 MG tablet as directed.explained clearly about the medication,insructions and side effects.            (E83.52) Serum calcium elevated  Plan: Patient has decreased calcium intake, recheck calcium, Parathyroid Hormone Intact            (R73.03) Prediabetes  Plan: Hemoglobin A1c            (I10) Essential hypertension, benign  Comment: Blood pressure stable  Plan: lisinopril (ZESTRIL) 40 MG tablet, amLODIPine         (NORVASC) 5 MG tablet refilled as directed.explained clearly about the medication,insructions and side effects.             (R60.9) Edema, unspecified type  Comment: Stable  Plan: furosemide (LASIX) 20 MG tablet once daily refilled as directed.explained clearly about the medication,insructions and side effects.            Patient has been advised of split billing requirements and indicates understanding: Yes      COUNSELING:  Reviewed preventive health counseling, as reflected in patient instructions       Regular exercise       Healthy diet/nutrition       Immunizations  Vaccinated for: Influenza          She reports that she has quit smoking. Her smoking use included cigarettes. She smoked an average of .5 packs per day. She quit smokeless tobacco use about 11 years  ago.      Appropriate preventive services were discussed with this patient, including applicable screening as appropriate for cardiovascular disease, diabetes, osteopenia/osteoporosis, and glaucoma.  As appropriate for age/gender, discussed screening for colorectal cancer, prostate cancer, breast cancer, and cervical cancer. Checklist reviewing preventive services available has been given to the patient.    Reviewed patients plan of care and provided an AVS. The Basic Care Plan (routine screening as documented in Health Maintenance) for [place] meets the Care Plan requirement. This Care Plan has been established and reviewed with the Patient.          Adri Malcolm MD  [facility]    Identified Health Risks:

## 2023-09-26 NOTE — NURSING NOTE
"/78   Pulse 80   Temp 96.9  F (36.1  C) (Tympanic)   Resp 16   Ht 1.676 m (5' 6\")   Wt 82.6 kg (182 lb)   SpO2 94%   BMI 29.38 kg/m      Prior to immunization administration, verified patients identity using patient s name and date of birth. Please see Immunization Activity for additional information.     Screening Questionnaire for Adult Immunization    Are you sick today?   No   Do you have allergies to medications, food, a vaccine component or latex?   No   Have you ever had a serious reaction after receiving a vaccination?   No   Do you have a long-term health problem with heart, lung, kidney, or metabolic disease (e.g., diabetes), asthma, a blood disorder, no spleen, complement component deficiency, a cochlear implant, or a spinal fluid leak?  Are you on long-term aspirin therapy?   No   Do you have cancer, leukemia, HIV/AIDS, or any other immune system problem?   No   Do you have a parent, brother, or sister with an immune system problem?   No   In the past 3 months, have you taken medications that affect  your immune system, such as prednisone, other steroids, or anticancer drugs; drugs for the treatment of rheumatoid arthritis, Crohn s disease, or psoriasis; or have you had radiation treatments?   No   Have you had a seizure, or a brain or other nervous system problem?   No   During the past year, have you received a transfusion of blood or blood    products, or been given immune (gamma) globulin or antiviral drug?   No   For women: Are you pregnant or is there a chance you could become       pregnant during the next month?   No   Have you received any vaccinations in the past 4 weeks?   No     Immunization questionnaire answers were all negative.      Patient instructed to remain in clinic for 15 minutes afterwards, and to report any adverse reactions.     Screening performed by Annelise Gibson MA on 9/26/2023 at 2:00 PM.        "

## 2023-09-27 LAB
ALBUMIN SERPL BCG-MCNC: 4.5 G/DL (ref 3.5–5.2)
ALP SERPL-CCNC: 85 U/L (ref 35–104)
ALT SERPL W P-5'-P-CCNC: 17 U/L (ref 0–50)
ANION GAP SERPL CALCULATED.3IONS-SCNC: 12 MMOL/L (ref 7–15)
AST SERPL W P-5'-P-CCNC: 23 U/L (ref 0–45)
BILIRUB SERPL-MCNC: 0.7 MG/DL
BUN SERPL-MCNC: 21.3 MG/DL (ref 8–23)
CALCIUM SERPL-MCNC: 9.9 MG/DL (ref 8.8–10.2)
CALCIUM SERPL-MCNC: 9.9 MG/DL (ref 8.8–10.2)
CHLORIDE SERPL-SCNC: 101 MMOL/L (ref 98–107)
CHOLEST SERPL-MCNC: 160 MG/DL
CREAT SERPL-MCNC: 1.05 MG/DL (ref 0.51–0.95)
DEPRECATED HCO3 PLAS-SCNC: 27 MMOL/L (ref 22–29)
EGFRCR SERPLBLD CKD-EPI 2021: 52 ML/MIN/1.73M2
GLUCOSE SERPL-MCNC: 117 MG/DL (ref 70–99)
HDLC SERPL-MCNC: 47 MG/DL
LDLC SERPL CALC-MCNC: 62 MG/DL
NONHDLC SERPL-MCNC: 113 MG/DL
POTASSIUM SERPL-SCNC: 4.4 MMOL/L (ref 3.4–5.3)
PROT SERPL-MCNC: 7.8 G/DL (ref 6.4–8.3)
PTH-INTACT SERPL-MCNC: 28 PG/ML (ref 15–65)
SODIUM SERPL-SCNC: 140 MMOL/L (ref 135–145)
TRIGL SERPL-MCNC: 253 MG/DL

## 2023-10-24 ENCOUNTER — OFFICE VISIT (OUTPATIENT)
Dept: INTERNAL MEDICINE | Facility: CLINIC | Age: 83
End: 2023-10-24
Payer: MEDICARE

## 2023-10-24 VITALS
OXYGEN SATURATION: 96 % | WEIGHT: 178.7 LBS | HEIGHT: 66 IN | HEART RATE: 104 BPM | TEMPERATURE: 96.8 F | RESPIRATION RATE: 17 BRPM | BODY MASS INDEX: 28.72 KG/M2 | SYSTOLIC BLOOD PRESSURE: 138 MMHG | DIASTOLIC BLOOD PRESSURE: 70 MMHG

## 2023-10-24 DIAGNOSIS — E11.9 TYPE 2 DIABETES MELLITUS WITHOUT COMPLICATION, WITHOUT LONG-TERM CURRENT USE OF INSULIN (H): Primary | ICD-10-CM

## 2023-10-24 PROCEDURE — 99213 OFFICE O/P EST LOW 20 MIN: CPT | Performed by: INTERNAL MEDICINE

## 2023-10-24 NOTE — PROGRESS NOTES
"  Assessment & Plan     (E11.9) Type 2 diabetes mellitus without complication, without long-term current use of insulin (H)  (primary encounter diagnosis)  Plan: A1c 6.7 consistent with diabetes.  Discussed new diagnosis of diabetes with patient, discussed ADA diet and regular exercise, discussed annual eye exams,  Pt was educated about DM,risks and complications.   Check  Albumin Random Urine Quantitative with Creat Ratio  Rpt A1c in 6 months        Review of the result(s) of each unique test - a1c        BMI:   Estimated body mass index is 28.84 kg/m  as calculated from the following:    Height as of this encounter: 1.676 m (5' 6\").    Weight as of this encounter: 81.1 kg (178 lb 11.2 oz).            Adri Malcolm MD  [facility] 83 [facility], presenting for the following health issues:  Follow Up (Labs)      10/24/2023     2:57 PM   Additional Questions   Roomed by jerry   Accompanied by self         10/24/2023     2:57 PM   Patient Reported Additional Medications   Patient reports taking the following new medications none       HPI     Pt is a 83 year old female who is seen here to day to discuss recent lab results. Had prediabetes in the past and recent A1c is 6.7 consistent with diabetes. No polyuria/polydipsia.   Last eye exam -  04/23- [facility]       Past Medical History:   Diagnosis Date    Essential hypertension, benign     Mixed hyperlipidemia     Osteoporosis, unspecified     Other seborrheic keratosis     sees DERM    Tobacco use disorder     quit smoking in 09/12       Current Outpatient Medications   Medication Sig Dispense Refill    amLODIPine (NORVASC) 5 MG tablet Take 1 tablet (5 mg) by mouth daily 90 tablet 1    aspirin 81 MG tablet Take 81 mg by mouth daily      CALCIUM 600 + D 600-200 MG-UNIT OR TABS   0    eszopiclone (LUNESTA) 1 MG tablet TAKE 1 TABLET BY MOUTH 1 TO 2 TIMES EVERY WEEK AS NEEDED FOR INSOMNIA 18 tablet 2    " "fish oil-omega-3 fatty acids 1000 MG capsule Take 2 g by mouth daily.      furosemide (LASIX) 20 MG tablet Take 1 tablet (20 mg) by mouth daily 90 tablet 1    lisinopril (ZESTRIL) 40 MG tablet Take 1 tablet (40 mg) by mouth daily 90 tablet 1    omeprazole (PRILOSEC) 20 MG DR capsule TAKE 1 CAPSULE(20 MG) BY MOUTH DAILY 90 capsule 3    rosuvastatin (CRESTOR) 5 MG tablet Take 1 tablet (5 mg) by mouth daily 90 tablet 1    sennosides (SENOKOT) 8.6 MG tablet Take 2 tablets by mouth daily              Review of Systems   CONSTITUTIONAL: NEGATIVE for fever, chills, change in weight  RESP: NEGATIVE for significant cough or SOB  CV: NEGATIVE for chest pain, palpitations or peripheral edema  ENDOCRINE: NEGATIVE for  polydipsia, and polyuria      Objective    /70   Pulse 104   Temp 96.8  F (36  C) (Tympanic)   Resp 17   Ht 1.676 m (5' 6\")   Wt 81.1 kg (178 lb 11.2 oz)   SpO2 96%   BMI 28.84 kg/m    Body mass index is 28.84 kg/m .  Physical Exam   GENERAL: alert and no distress  RESP: lungs clear to auscultation - no rales, rhonchi or wheezes  CV: regular rate and rhythm, normal S1 S2,          "

## 2023-10-24 NOTE — NURSING NOTE
"/70   Pulse 104   Temp 96.8  F (36  C) (Tympanic)   Resp 17   Ht 1.676 m (5' 6\")   Wt 81.1 kg (178 lb 11.2 oz)   SpO2 96%   BMI 28.84 kg/m      "

## 2023-10-29 PROBLEM — E11.9 TYPE 2 DIABETES MELLITUS WITHOUT COMPLICATION, WITHOUT LONG-TERM CURRENT USE OF INSULIN (H): Status: ACTIVE | Noted: 2023-10-29

## 2024-01-20 ENCOUNTER — HEALTH MAINTENANCE LETTER (OUTPATIENT)
Age: 84
End: 2024-01-20

## 2024-04-16 ENCOUNTER — OFFICE VISIT (OUTPATIENT)
Dept: INTERNAL MEDICINE | Facility: CLINIC | Age: 84
End: 2024-04-16
Payer: MEDICARE

## 2024-04-16 VITALS
HEART RATE: 82 BPM | RESPIRATION RATE: 16 BRPM | TEMPERATURE: 96.9 F | HEIGHT: 66 IN | OXYGEN SATURATION: 96 % | DIASTOLIC BLOOD PRESSURE: 76 MMHG | SYSTOLIC BLOOD PRESSURE: 136 MMHG | WEIGHT: 167.6 LBS | BODY MASS INDEX: 26.93 KG/M2

## 2024-04-16 DIAGNOSIS — M81.0 OSTEOPOROSIS, UNSPECIFIED OSTEOPOROSIS TYPE, UNSPECIFIED PATHOLOGICAL FRACTURE PRESENCE: ICD-10-CM

## 2024-04-16 DIAGNOSIS — N18.31 STAGE 3A CHRONIC KIDNEY DISEASE (H): ICD-10-CM

## 2024-04-16 DIAGNOSIS — G47.00 INSOMNIA, UNSPECIFIED TYPE: ICD-10-CM

## 2024-04-16 DIAGNOSIS — Z00.00 ENCOUNTER FOR MEDICARE ANNUAL WELLNESS EXAM: ICD-10-CM

## 2024-04-16 DIAGNOSIS — R60.9 EDEMA, UNSPECIFIED TYPE: ICD-10-CM

## 2024-04-16 DIAGNOSIS — I10 ESSENTIAL HYPERTENSION, BENIGN: ICD-10-CM

## 2024-04-16 DIAGNOSIS — E78.2 MIXED HYPERLIPIDEMIA: ICD-10-CM

## 2024-04-16 DIAGNOSIS — E11.9 TYPE 2 DIABETES MELLITUS WITHOUT COMPLICATION, WITHOUT LONG-TERM CURRENT USE OF INSULIN (H): Primary | ICD-10-CM

## 2024-04-16 LAB
HBA1C MFR BLD: 6 % (ref 0–5.6)
HGB BLD-MCNC: 13.6 G/DL (ref 11.7–15.7)

## 2024-04-16 PROCEDURE — 84443 ASSAY THYROID STIM HORMONE: CPT | Performed by: INTERNAL MEDICINE

## 2024-04-16 PROCEDURE — 99214 OFFICE O/P EST MOD 30 MIN: CPT | Mod: 25 | Performed by: INTERNAL MEDICINE

## 2024-04-16 PROCEDURE — 82570 ASSAY OF URINE CREATININE: CPT | Performed by: INTERNAL MEDICINE

## 2024-04-16 PROCEDURE — 83036 HEMOGLOBIN GLYCOSYLATED A1C: CPT | Performed by: INTERNAL MEDICINE

## 2024-04-16 PROCEDURE — 80053 COMPREHEN METABOLIC PANEL: CPT | Performed by: INTERNAL MEDICINE

## 2024-04-16 PROCEDURE — G0439 PPPS, SUBSEQ VISIT: HCPCS | Performed by: INTERNAL MEDICINE

## 2024-04-16 PROCEDURE — 36415 COLL VENOUS BLD VENIPUNCTURE: CPT | Performed by: INTERNAL MEDICINE

## 2024-04-16 PROCEDURE — 82043 UR ALBUMIN QUANTITATIVE: CPT | Performed by: INTERNAL MEDICINE

## 2024-04-16 PROCEDURE — 80061 LIPID PANEL: CPT | Performed by: INTERNAL MEDICINE

## 2024-04-16 PROCEDURE — 85018 HEMOGLOBIN: CPT | Performed by: INTERNAL MEDICINE

## 2024-04-16 RX ORDER — FUROSEMIDE 20 MG
20 TABLET ORAL DAILY
Qty: 90 TABLET | Refills: 1 | Status: SHIPPED | OUTPATIENT
Start: 2024-04-16

## 2024-04-16 RX ORDER — ESZOPICLONE 1 MG/1
TABLET, FILM COATED ORAL
Qty: 18 TABLET | Refills: 0 | Status: SHIPPED | OUTPATIENT
Start: 2024-04-16 | End: 2024-07-14

## 2024-04-16 RX ORDER — AMLODIPINE BESYLATE 5 MG/1
5 TABLET ORAL DAILY
Qty: 90 TABLET | Refills: 1 | Status: SHIPPED | OUTPATIENT
Start: 2024-04-16

## 2024-04-16 RX ORDER — LISINOPRIL 40 MG/1
40 TABLET ORAL DAILY
Qty: 90 TABLET | Refills: 1 | Status: SHIPPED | OUTPATIENT
Start: 2024-04-16

## 2024-04-16 RX ORDER — ROSUVASTATIN CALCIUM 5 MG/1
5 TABLET, COATED ORAL DAILY
Qty: 90 TABLET | Refills: 1 | Status: SHIPPED | OUTPATIENT
Start: 2024-04-16

## 2024-04-16 SDOH — HEALTH STABILITY: PHYSICAL HEALTH: ON AVERAGE, HOW MANY MINUTES DO YOU ENGAGE IN EXERCISE AT THIS LEVEL?: 0 MIN

## 2024-04-16 SDOH — HEALTH STABILITY: PHYSICAL HEALTH: ON AVERAGE, HOW MANY DAYS PER WEEK DO YOU ENGAGE IN MODERATE TO STRENUOUS EXERCISE (LIKE A BRISK WALK)?: 0 DAYS

## 2024-04-16 ASSESSMENT — SOCIAL DETERMINANTS OF HEALTH (SDOH): HOW OFTEN DO YOU GET TOGETHER WITH FRIENDS OR RELATIVES?: PATIENT DECLINED

## 2024-04-16 NOTE — PROGRESS NOTES
Preventive Care Visit  [facility]  Adri Malcolm MD, Internal Medicine  Apr 16, 2024      Assessment & Plan       (Z00.00) Encounter for Medicare annual wellness exam  Plan: Hemoglobin, pt gets mammogram every 2 yrs, dexa ordered.          (E11.9) Type 2 diabetes mellitus without complication, without long-term current use of insulin (H)   Plan:not on any medications, on ADA diet and exercise. check Hemoglobin A1c, Comprehensive metabolic panel, Lipid panel reflex to direct LDL Fasting, TSH with free T4 reflex, Albumin Random Urine         Quantitative with Creat Ratio           (I10) Essential hypertension, benign  Comment: BP stable   Plan: amLODIPine (NORVASC) 5 MG tablet, lisinopril         (ZESTRIL) 40 MG tablet refilled as directed.explained clearly about the medication,insructions and side effects.             (N18.31) Stage 3a chronic kidney disease (H)  Plan: monitor creatinine, avoid nephrotoxins.     (M81.0) Osteoporosis, unspecified osteoporosis type, unspecified pathological fracture presence  Plan: DX Bone Density, was on boniva in the past, does not want to take any meds at this time             (G47.00) Insomnia, unspecified type  Plan: eszopiclone (LUNESTA) 1 MG tablet refilled.explained clearly about the medication,insructions and side effects. Advised not to drive or operate any machinery while on this med       (E78.2) Mixed hyperlipidemia  Plan: check lipid panel, continue rosuvastatin (CRESTOR) 5 MG tablet refilled.explained clearly about the medication,insructions and side effects.      (R60.9) Edema, unspecified type  Plan: furosemide (LASIX) 20 MG tablet refilled .explained clearly about the medication,insructions and side effects.       Patient has been advised of split billing requirements and indicates understanding: Yes  Review of the result(s) of each unique test - a1c  Prescription drug management        BMI  Estimated body mass index is 27.05  "kg/m  as calculated from the following:    Height as of this encounter: 1.676 m (5' 6\").    Weight as of this encounter: 76 kg (167 lb 9.6 oz).       Counseling  Appropriate preventive services were discussed with this patient, including applicable screening as appropriate for fall prevention, nutrition, physical activity,         Subjective   Mable is a 84 year old, presenting for the following:  Medicare Visit (fasting)        4/16/2024    10:05 AM   Additional Questions   Roomed by jerry   Accompanied by self         4/16/2024    10:05 AM   Patient Reported Additional Medications   Patient reports taking the following new medications none         Health Care Directive  Patient does not have a Health Care Directive or Living Will: Discussed advance care planning with patient; information given to patient to review.    History of Present Illness       Reason for visit:  WellnessShe exercises with enough effort to increase her heart rate 9 or less minutes per day.  She exercises with enough effort to increase her heart rate 3 or less days per week.   She is taking medications regularly.    Diabetes Follow-up    How often are you checking your blood sugar? Not at all  What concerns do you have today about your diabetes? None   Do you have any of these symptoms? (Select all that apply)  No numbness or tingling in feet.  No redness, sores or blisters on feet.  No complaints of excessive thirst.  No reports of blurry vision.  No significant changes to weight.  Have you had a diabetic eye exam in the last 12 months? No  Last eye exam -[facility]  04/23       BP Readings from Last 2 Encounters:   04/16/24 136/76   10/24/23 138/70     Hemoglobin A1C (%)   Date Value   09/26/2023 6.7 (H)   04/12/2023 6.4 (H)   07/07/2021 6.2 (H)   02/09/2010 6.1 (H)     LDL Cholesterol Calculated (mg/dL)   Date Value   09/26/2023 62   10/11/2022 52   10/07/2020 79   07/31/2019 96        Hyperlipidemia Follow-Up    Are you " regularly taking any medication or supplement to lower your cholesterol?   Yes- crestor  Are you having muscle aches or other side effects that you think could be caused by your cholesterol lowering medication?  No    Hypertension Follow-up    Do you check your blood pressure regularly outside of the clinic? No   Are you following a low salt diet? Yes  Are your blood pressures ever more than 140 on the top number (systolic) OR more   than 90 on the bottom number (diastolic), for example 140/90? No      Chronic Kidney Disease Follow-up  {Do you take any over the counter pain medicine?: No    Insomnia -follow up-  on lunesta and requesting refills.          4/16/2024   General Health   How would you rate your overall physical health? Excellent   Feel stress (tense, anxious, or unable to sleep) Patient declined         4/16/2024   Nutrition   Diet: Regular (no restrictions)         4/16/2024   Exercise   Days per week of moderate/strenous exercise 0 days   Average minutes spent exercising at this level 0 min   (!) EXERCISE CONCERN      4/16/2024   Social Factors   Frequency of gathering with friends or relatives Patient declined   Worry food won't last until get money to buy more No   Food not last or not have enough money for food? No   Do you have housing?  Yes   Are you worried about losing your housing? No   Lack of transportation? No   Unable to get utilities (heat,electricity)? No         4/16/2024   Fall Risk   Fallen 2 or more times in the past year? No    No   Trouble with walking or balance? No    No          4/16/2024   Activities of Daily Living- Home Safety   Needs help with the following daily activites None of the above   Safety concerns in the home None of the above         4/16/2024   Dental   Dentist two times every year? (!) DECLINE         4/16/2024   Hearing Screening   Hearing concerns? None of the above         4/16/2024   Driving Risk Screening   Patient/family members have concerns about  driving No         4/16/2024   General Alertness/Fatigue Screening   Have you been more tired than usual lately? (!) DECLINE         4/16/2024   Urinary Incontinence Screening   Bothered by leaking urine in past 6 months No         4/16/2024   TB Screening   Were you born outside of the [place]? No         Today's PHQ-2 Score:       4/16/2024    10:03 AM   PHQ-2 ( 1999 Pfizer)   Q1: Little interest or pleasure in doing things 0   Q2: Feeling down, depressed or hopeless 0   PHQ-2 Score 0   Q1: Little interest or pleasure in doing things Not at all   Q2: Feeling down, depressed or hopeless Not at all   PHQ-2 Score 0           4/16/2024   Substance Use   Alcohol more than 3/day or more than 7/wk No   Do you have a current opioid prescription? No   How severe/bad is pain from 1 to 10? 0/10 (No Pain)   Do you use any other substances recreationally? No     Social History     Tobacco Use    Smoking status: Former     Current packs/day: 0.50     Types: Cigarettes    Smokeless tobacco: Former     Quit date: 9/11/2012    Tobacco comments:     Has smoked for over 30 yrs.   Vaping Use    Vaping status: Never Used   Substance Use Topics    Alcohol use: No    Drug use: No           4/12/2023   LAST FHS-7 RESULTS   1st degree relative breast or ovarian cancer No   Any relative bilateral breast cancer No   Any male have breast cancer No   Any ONE woman have BOTH breast AND ovarian cancer No   Any woman with breast cancer before 50yrs No   2 or more relatives with breast AND/OR ovarian cancer No   2 or more relatives with breast AND/OR bowel cancer No        Pt gets mammogram every 2 yrs             Reviewed and updated as needed this visit by Provider                    Past Medical History:   Diagnosis Date    Essential hypertension, benign     Mixed hyperlipidemia     Osteoporosis, unspecified     Other seborrheic keratosis     sees DERM    Tobacco use disorder     quit smoking in 09/12     Past Surgical History:   Procedure  Laterality Date    COLONOSCOPY      COLONOSCOPY N/A 6/25/2015    Procedure: COLONOSCOPY;  Surgeon: Santosh Liao MD;  Location: [facility]    HC REMOVAL OF TONSILS,<11 Y/O       Current Outpatient Medications   Medication Sig Dispense Refill    amLODIPine (NORVASC) 5 MG tablet Take 1 tablet (5 mg) by mouth daily 90 tablet 1    aspirin 81 MG tablet Take 81 mg by mouth daily      CALCIUM 600 + D 600-200 MG-UNIT OR TABS   0    eszopiclone (LUNESTA) 1 MG tablet 1 tab by mouth 1- 2 times week as needed for insomnia 18 tablet 0    fish oil-omega-3 fatty acids 1000 MG capsule Take 2 g by mouth daily.      furosemide (LASIX) 20 MG tablet Take 1 tablet (20 mg) by mouth daily 90 tablet 1    lisinopril (ZESTRIL) 40 MG tablet Take 1 tablet (40 mg) by mouth daily 90 tablet 1    omeprazole (PRILOSEC) 20 MG DR capsule TAKE 1 CAPSULE(20 MG) BY MOUTH DAILY 90 capsule 3    rosuvastatin (CRESTOR) 5 MG tablet Take 1 tablet (5 mg) by mouth daily 90 tablet 1    sennosides (SENOKOT) 8.6 MG tablet Take 2 tablets by mouth daily       Current providers sharing in care for this patient include:  Patient Care Team:  Adri Malcoml MD as PCP - General  Adri Malcolm MD as Assigned PCP    The following health maintenance items are reviewed in Epic and correct as of today:  Health Maintenance   Topic Date Due    DIABETIC FOOT EXAM  Never done    ANNUAL REVIEW OF HM ORDERS  Never done    RSV VACCINE (Pregnancy & 60+) (1 - 1-dose 60+ series) Never done    MICROALBUMIN  04/04/2013    COVID-19 Vaccine (3 - 2023-24 season) 09/01/2023    A1C  12/26/2023    EYE EXAM  04/27/2024    MEDICARE ANNUAL WELLNESS VISIT  09/26/2024    BMP  09/26/2024    LIPID  09/26/2024    HEMOGLOBIN  09/26/2024    FALL RISK ASSESSMENT  04/16/2025    ADVANCE CARE PLANNING  09/26/2028    DTAP/TDAP/TD IMMUNIZATION (2 - Td or Tdap) 04/29/2029    DEXA  11/23/2037    PHQ-2 (once per calendar year)  Completed    INFLUENZA VACCINE  Completed    Pneumococcal  "Vaccine: 65+ Years  Completed    URINALYSIS  Completed    ZOSTER IMMUNIZATION  Completed    IPV IMMUNIZATION  Aged Out    HPV IMMUNIZATION  Aged Out    MENINGITIS IMMUNIZATION  Aged Out    RSV MONOCLONAL ANTIBODY  Aged Out          Objective    Exam  /76   Pulse 82   Temp 96.9  F (36.1  C) (Tympanic)   Resp 16   Ht 1.676 m (5' 6\")   Wt 76 kg (167 lb 9.6 oz)   SpO2 96%   BMI 27.05 kg/m     Estimated body mass index is 27.05 kg/m  as calculated from the following:    Height as of this encounter: 1.676 m (5' 6\").    Weight as of this encounter: 76 kg (167 lb 9.6 oz).    Physical Exam  GENERAL: alert and no distress  EYES: Eyes grossly normal to inspection, PERRL and conjunctivae and sclerae normal  HENT: ear canals and TM's normal, nose and mouth without ulcers or lesions  RESP: lungs clear to auscultation - no rales, rhonchi or wheezes  BREAST: normal without masses, tenderness or nipple discharge and no palpable axillary masses or adenopathy  CV: regular rate and rhythm, normal S1 S2,   ABDOMEN: soft, nontender,   and bowel sounds normal  MS: no gross musculoskeletal defects noted, no edema  NEURO: Normal strength and tone, mentation intact and speech normal  PSYCH: mentation appears normal, affect normal/bright  Diabetic foot exam: normal DP and PT pulses, no trophic changes or ulcerative lesions, normal sensory exam, and normal monofilament exam        4/16/2024   Mini Cog   Clock Draw Score 2 Normal   3 Item Recall 3 objects recalled   Mini Cog Total Score 5              Signed Electronically by: Adri Malcolm MD    "

## 2024-04-16 NOTE — NURSING NOTE
"/76   Pulse 82   Temp 96.9  F (36.1  C) (Tympanic)   Resp 16   Ht 1.676 m (5' 6\")   Wt 76 kg (167 lb 9.6 oz)   SpO2 96%   BMI 27.05 kg/m      "

## 2024-04-16 NOTE — COMMUNITY RESOURCES LIST (ENGLISH)
April 16, 2024           YOUR PERSONALIZED LIST OF SERVICES & PROGRAMS           & RECREATION    Sports      [facility]  [address] [place] [address] (Distance: 23.6 miles)  Website: http://Worktopia/  Language: English, Ethiopian  Fee: Free      [facility] Adult [facility]rich[facility]  Phone: [phone]  Website: [email or web address]  Language: English  Hours: Mon 7:30 AM - 4:00 PM Tue 7:30 AM - 4:00 PM Wed 7:30 AM - 4:00 PM Thu 7:30 AM - 4:00 PM Fri 7:30 AM - 4:00 PM    Classes/Groups      [employer] [facility] Group fitness programming  [address] [place] [address] (Distance: 1.3 miles)  Phone: [phone]  Email: info@[email or web address]  Website: [employer]  Language: English, Ethiopian  Fee: Free  Accessibility: [employer] accessible, Translation services      [facility]  Phone: [phone]  Website: [email or web address]  Language: English  Hours: Mon 7:30 AM - 4:00 PM Tue 7:30 AM - 4:00 PM Wed 7:30 AM - 4:00 PM Thu 7:30 AM - 4:00 PM Fri 7:30 AM - 4:00 PM      [employer] - Care Coordination  Phone: [phone]  Website: [employer]  Language: English, Ethiopian, Montenegrin  Hours: Mon 9:00 AM - 5:00 PM Tue 9:00 AM - 5:00 PM Wed 9:00 AM - 5:00 PM Thu 9:00 AM - 5:00 PM Fri 9:00 AM - 5:00 PM  Fee: Sliding scale               IMPORTANT NUMBERS & WEBSITES        Emergency Services  911  .   United Way  211 http://211unitedway.org  .   Poison Control  (572) 519-5516 http://[place]poison.org http://w[place]poison.org  .     Suicide and Crisis Lifeline  988 http://988lifeline.org  .   Childhelp [place] Child Abuse Hotline  376.383.3285 http://Childhelphotline.org   .   National Sexual Assault Hotline  (808) 376-4868 (HOPE) http://Rainn.org   .     National Runaway Safeline  (624) 280-9997 (RUNAWAY) http://[employer].org  .    Pregnancy & Postpartum Support  Call/text 947-810-6705  [place]: http://ppsupportmn.org  [place]: http://Koru.com/wi  .   Substance Abuse National Helpline ([facility])  839-520-HELP (4857) http://Findtreatment.gov   .                DISCLAIMER: These resources have been generated via the [employer] Platform. [employer] does not endorse any service providers mentioned in this resource list. [employer] does not guarantee that the services mentioned in this resource list will be available to you or will improve your health or wellness.    [facility]

## 2024-04-17 LAB
ALBUMIN SERPL BCG-MCNC: 4.7 G/DL (ref 3.5–5.2)
ALP SERPL-CCNC: 78 U/L (ref 40–150)
ALT SERPL W P-5'-P-CCNC: 17 U/L (ref 0–50)
ANION GAP SERPL CALCULATED.3IONS-SCNC: 14 MMOL/L (ref 7–15)
AST SERPL W P-5'-P-CCNC: 23 U/L (ref 0–45)
BILIRUB SERPL-MCNC: 0.7 MG/DL
BUN SERPL-MCNC: 32.7 MG/DL (ref 8–23)
CALCIUM SERPL-MCNC: 10.3 MG/DL (ref 8.8–10.2)
CHLORIDE SERPL-SCNC: 100 MMOL/L (ref 98–107)
CHOLEST SERPL-MCNC: 174 MG/DL
CREAT SERPL-MCNC: 1.24 MG/DL (ref 0.51–0.95)
CREAT UR-MCNC: 48.2 MG/DL
DEPRECATED HCO3 PLAS-SCNC: 27 MMOL/L (ref 22–29)
EGFRCR SERPLBLD CKD-EPI 2021: 43 ML/MIN/1.73M2
FASTING STATUS PATIENT QL REPORTED: YES
GLUCOSE SERPL-MCNC: 119 MG/DL (ref 70–99)
HDLC SERPL-MCNC: 69 MG/DL
LDLC SERPL CALC-MCNC: 79 MG/DL
MICROALBUMIN UR-MCNC: 43.9 MG/L
MICROALBUMIN/CREAT UR: 91.08 MG/G CR (ref 0–25)
NONHDLC SERPL-MCNC: 105 MG/DL
POTASSIUM SERPL-SCNC: 4.9 MMOL/L (ref 3.4–5.3)
PROT SERPL-MCNC: 7.8 G/DL (ref 6.4–8.3)
SODIUM SERPL-SCNC: 141 MMOL/L (ref 135–145)
TRIGL SERPL-MCNC: 129 MG/DL
TSH SERPL DL<=0.005 MIU/L-ACNC: 3.34 UIU/ML (ref 0.3–4.2)

## 2024-04-18 ENCOUNTER — TELEPHONE (OUTPATIENT)
Dept: INTERNAL MEDICINE | Facility: CLINIC | Age: 84
End: 2024-04-18
Payer: MEDICARE

## 2024-04-30 ENCOUNTER — TRANSFERRED RECORDS (OUTPATIENT)
Dept: HEALTH INFORMATION MANAGEMENT | Facility: CLINIC | Age: 84
End: 2024-04-30
Payer: MEDICARE

## 2024-05-02 ENCOUNTER — TELEPHONE (OUTPATIENT)
Dept: INTERNAL MEDICINE | Facility: CLINIC | Age: 84
End: 2024-05-02
Payer: MEDICARE

## 2024-05-02 NOTE — TELEPHONE ENCOUNTER
Prior Authorization Retail Medication Request    Medication/Dose: Eszopiclone 1mg   Diagnosis and ICD code (if different than what is on RX):  Insomnia  New/renewal/insurance change PA/secondary ins. PA:  Previously Tried and Failed:    Rationale:      Insurance   Primary:   Insurance ID:      Secondary (if applicable):  Insurance ID:      Pharmacy Information (if different than what is on RX)  Name:  [facility]  Phone:  [phone]  Fax:[phone]     
Central Prior Authorization Team  Phone: [phone]    Prior Authorization Approval    Medication: ESZOPICLONE 1 MG PO TABS  Authorization Effective Date: 2/1/2024  Authorization Expiration Date: 12/31/2024  Approved Dose/Quantity:   Reference #:     Insurance Company: [employer] - Phone [phone] Fax [phone]  Expected CoPay: $    CoPay Card Available:      Financial Assistance Needed:   Which Pharmacy is filling the prescription: [id number] - [place], [place] - [address]  Pharmacy Notified: yes  Patient Notified: PHARMACY WILL NOTIFY PT WHEN READY      
Central Prior Authorization Team  Phone: [phone]    PA Initiation    Medication: ESZOPICLONE 1 MG PO TABS  Insurance Company: [employer] - Phone [phone] Fax [phone]  Pharmacy Filling the Rx: [id number] - [place], [place] - [address]  Filling Pharmacy Phone: [phone]  Filling Pharmacy Fax:    Start Date: 5/1/2024      
3 = assistive equipment and person

## 2024-05-02 NOTE — TELEPHONE ENCOUNTER
"Patient is calling to speak with Cynthia who asked her about the address of the eye clinic she's been to. Patient gave the following information[facility] [address]  [address] [place] [address]  Patient added that there has been no changes with her eye check-up.     Patient also wants to know if Dr. Malcolm can work her in for a follow-up regarding her \"collapsed uterus\". Patient states that Dr. Malcolm have seen her in the past for this. No current symptoms. Advised patient that Dr. Malcolm is not in the office but patient said she is willing to wait. Patient would be available any day except on 05/08.     Routing to provider for advise.     "

## 2024-05-08 NOTE — TELEPHONE ENCOUNTER
Please check when was her Eye exam (month and yr) so we can update her record.    Pt should see OBGYN for prolapsed uterus   please advise pt to schedule appt with OBGYN

## 2024-05-08 NOTE — TELEPHONE ENCOUNTER
I called [facility] at [phone] to ask and she was seen last with them 4/30/24 for an annual exam. They would not send over the records without a MIHIR.  I spoke to the patient and she will call them and ask them to send her records.    I also advised her to schedule with ob/gyn per Dr Malcolm and I gave her the number to call.

## 2024-05-14 DIAGNOSIS — K21.9 GASTROESOPHAGEAL REFLUX DISEASE, UNSPECIFIED WHETHER ESOPHAGITIS PRESENT: ICD-10-CM

## 2024-06-10 ENCOUNTER — ANCILLARY PROCEDURE (OUTPATIENT)
Dept: BONE DENSITY | Facility: CLINIC | Age: 84
End: 2024-06-10
Attending: INTERNAL MEDICINE
Payer: MEDICARE

## 2024-06-10 DIAGNOSIS — M81.0 OSTEOPOROSIS, UNSPECIFIED OSTEOPOROSIS TYPE, UNSPECIFIED PATHOLOGICAL FRACTURE PRESENCE: ICD-10-CM

## 2024-06-10 PROCEDURE — 77080 DXA BONE DENSITY AXIAL: CPT | Mod: TC | Performed by: PHYSICIAN ASSISTANT

## 2024-06-20 NOTE — TELEPHONE ENCOUNTER
Patient calls requesting she be contacted as soon as possible with her recent lab results.  Please advise.   Imaging Studies

## 2024-07-14 DIAGNOSIS — G47.00 INSOMNIA, UNSPECIFIED TYPE: ICD-10-CM

## 2024-07-14 RX ORDER — ESZOPICLONE 1 MG/1
TABLET, FILM COATED ORAL
Qty: 18 TABLET | Refills: 0 | Status: SHIPPED | OUTPATIENT
Start: 2024-07-14 | End: 2024-09-16

## 2024-07-25 ENCOUNTER — OFFICE VISIT (OUTPATIENT)
Dept: OBGYN | Facility: CLINIC | Age: 84
End: 2024-07-25
Payer: MEDICARE

## 2024-07-25 ENCOUNTER — ANCILLARY PROCEDURE (OUTPATIENT)
Dept: ULTRASOUND IMAGING | Facility: CLINIC | Age: 84
End: 2024-07-25
Attending: OBSTETRICS & GYNECOLOGY
Payer: MEDICARE

## 2024-07-25 VITALS
HEIGHT: 66 IN | DIASTOLIC BLOOD PRESSURE: 70 MMHG | SYSTOLIC BLOOD PRESSURE: 128 MMHG | WEIGHT: 166 LBS | BODY MASS INDEX: 26.68 KG/M2

## 2024-07-25 DIAGNOSIS — B96.89 BACTERIAL VAGINOSIS: Primary | ICD-10-CM

## 2024-07-25 DIAGNOSIS — N81.3 PROCIDENTIA OF UTERUS: ICD-10-CM

## 2024-07-25 DIAGNOSIS — N89.8 VAGINAL DISCHARGE: ICD-10-CM

## 2024-07-25 DIAGNOSIS — N95.0 PMB (POSTMENOPAUSAL BLEEDING): ICD-10-CM

## 2024-07-25 DIAGNOSIS — N76.0 BACTERIAL VAGINOSIS: Primary | ICD-10-CM

## 2024-07-25 DIAGNOSIS — N95.0 PMB (POSTMENOPAUSAL BLEEDING): Primary | ICD-10-CM

## 2024-07-25 LAB
CLUE CELLS: PRESENT
TRICHOMONAS, WET PREP: ABNORMAL
WBC'S/HIGH POWER FIELD, WET PREP: ABNORMAL
YEAST, WET PREP: ABNORMAL

## 2024-07-25 PROCEDURE — 76830 TRANSVAGINAL US NON-OB: CPT | Performed by: FAMILY MEDICINE

## 2024-07-25 PROCEDURE — 99204 OFFICE O/P NEW MOD 45 MIN: CPT | Performed by: OBSTETRICS & GYNECOLOGY

## 2024-07-25 PROCEDURE — 87210 SMEAR WET MOUNT SALINE/INK: CPT | Performed by: OBSTETRICS & GYNECOLOGY

## 2024-07-25 PROCEDURE — 76856 US EXAM PELVIC COMPLETE: CPT | Performed by: FAMILY MEDICINE

## 2024-07-25 RX ORDER — METRONIDAZOLE 500 MG/1
500 TABLET ORAL 2 TIMES DAILY
Qty: 14 TABLET | Refills: 0 | Status: SHIPPED | OUTPATIENT
Start: 2024-07-25 | End: 2024-08-01

## 2024-07-25 NOTE — NURSING NOTE
"Chief Complaint   Patient presents with    Uterine Prolapse     Diagnosed in [facility]       Initial /70 (BP Location: Right arm, Patient Position: Chair, Cuff Size: Adult Large)   Ht 1.676 m (5' 6\")   Wt 75.3 kg (166 lb)   Breastfeeding No   BMI 26.79 kg/m   Estimated body mass index is 26.79 kg/m  as calculated from the following:    Height as of this encounter: 1.676 m (5' 6\").    Weight as of this encounter: 75.3 kg (166 lb).  BP completed using cuff size: large    Questioned patient about current smoking habits.  Pt. has never smoked.      No obstetric history on file.    The following HM Due: NONE    Anitha Miller CMA         "

## 2024-07-25 NOTE — PATIENT INSTRUCTIONS
Some Suggested Vulvar Pain & Itching Measures    The vulva is the external genitalia in the female.  The skin of the vulva can be quite sensitive.  Because it is moist and frequently subjected to friction while sitting and moving, this area can be easily injured.  There are various strategies that can be used to prevent irritation and allow the vulva to heal.  Keeping this area dry can accelerate healing.  Chemicals found in toilet tissues, laundry soaps and detergents that come in contact with the vulva can cause irritation.  Avoiding contact with potential irritants that contain chemicals is important.  Fabric softeners in undergarments, chemicals in deodorant soaps, bubble baths, feminine hygiene spray and panty liners, etc., can all cause irritation to the vulva.  The following recommendations are specific measures that can help minimize vulvar irritation.    Wear white 100% cotton underwear and do not wear underwear at night.  Do not wear pantyhose, tights, or other close-fitting clothes.  Enclosing this area with synthetic fibers holds both heat and moisture in the skin, conditions which potentiate the development of infections.  Tight-fitting clothes may also increase your symptoms of discomfort.    After washing underwear, put it through at least one whole cycle with water only.  Some women have suffered needlessly from irritants in detergents whose residue was left in clothes by incomplete rinsing.  Rinsing clothes thoroughly is more important than which detergent is used although to be on the safe side, the milder the soap, the better.  Wash new underwear before wearing.  Fabric softeners and dryer sheets should not be used.    Soaking in the tub or a sitz bath twice daily for 10-15 minutes can help (or using a sopping wet washcloth held to the vulva for 2-3 minutes during your shower). After soaking, pat dry the vulva and apply vaseline to the entire outer area. Please apply vaseline 3-4 times daily as a  barrier.    Rinse skin off with plain water frequently.  Use tap water, distilled water, sitz baths, squirt bottles, or bidets.  Additionally, hand held showers can be helpful for rinsing the vulva.  A wet warm washcloth can be held against the vulva to soak if a bath is unavailable.     Use very mild soap for bathing.  It is best not to use any soaps on the vulva.  The vulva should be rinsed with warm water.  Bars of soap such as Neutrogena unscented face soap, Dove, Basis, Pears (made in [place]), and castile soap with olive oil (Svitlana) are gentle to the other skin areas.  They are found at pharmacies or health food stores.  Remember that frequent baths with soaps may increase the irritation.  You cannot wash away your symptoms.    Some patients find the use of cool gel packs on the vulva to be helpful.    Do not use products with benzocaine.    Consider using 100% cotton menstrual pads and tampons.  Many women with vulvar pain experience a significant increase in irritation and pain every month when they use commercial paper pads or tampons.  This monthly increase in pain can often be reduced by using 100% washable and reusable cotton menstrual pads.  Pure cotton tampons are available. Or try a menstrual cup.    Don't sit or remain in a wet bathing suit for prolonged periods. Change underwear shortly after exercising as well.

## 2024-07-25 NOTE — PROGRESS NOTES
SUBJECTIVE:                                                   Paris Hull is a 84 year old female  presents with vaginal prolapse. She also is suspicious of a UTI due to discharge. She was treated with cephalexin 500mg 4 times per day. Discharge did not resolve. She did start a probiotic at that time. She reports developing a cold sore and rash around her mouth with antibiotic use.   She started noticing prolapse symptoms, notably bulging, starting 3 months ago. It is not associated with urinary incontinence. She is using pads due to increased discharge, starting a little after noticing the bulge.  She has  had no previous treatment for her condition.  The patient voids q2-3 hours, nocturia X 2-3. She does take lasix at about 4-5am. She denies any dysuria,  hematuria, hesitancy, intermittency, feeling of incomplete emptying or stones.    She does report scant spots of blood on her pad overnight occasionally.     The patient has no constipation or splinting, occasionally drinks prune juice if no BM for a day.  She is not sexually active and denies any dyspareunia or pelvic pain. She has no neurological or balance problems.     Obstetric Hx:  She is .  The weight of her largest baby was almost 9 lbs.  Baby was delivered vaginally, non-operative delivery.  No hx HRT.    Past Medical History:   Diagnosis Date    Essential hypertension, benign     Mixed hyperlipidemia     Osteoporosis, unspecified     Other seborrheic keratosis     sees DERM    Tobacco use disorder     quit smoking in        Past Surgical History:   Procedure Laterality Date    COLONOSCOPY      COLONOSCOPY N/A 2015    Procedure: COLONOSCOPY;  Surgeon: Santosh Liao MD;  Location:  GI    HC REMOVAL OF TONSILS,<11 Y/O       Current Outpatient Medications   Medication Sig Dispense Refill    amLODIPine (NORVASC) 5 MG tablet Take 1 tablet (5 mg) by mouth daily 90 tablet 1    aspirin 81 MG tablet Take 81 mg by mouth daily       "CALCIUM 600 + D 600-200 MG-UNIT OR TABS   0    eszopiclone (LUNESTA) 1 MG tablet TAKE 1 TABLET BY MOUTH 1 TO 2 TIMES A WEEK AS NEEDED FOR INSOMNIA 18 tablet 0    fish oil-omega-3 fatty acids 1000 MG capsule Take 2 g by mouth daily.      furosemide (LASIX) 20 MG tablet Take 1 tablet (20 mg) by mouth daily 90 tablet 1    lisinopril (ZESTRIL) 40 MG tablet Take 1 tablet (40 mg) by mouth daily 90 tablet 1    omeprazole (PRILOSEC) 20 MG DR capsule TAKE 1 CAPSULE(20 MG) BY MOUTH DAILY 90 capsule 1    rosuvastatin (CRESTOR) 5 MG tablet Take 1 tablet (5 mg) by mouth daily 90 tablet 1    sennosides (SENOKOT) 8.6 MG tablet Take 2 tablets by mouth daily       No current facility-administered medications for this visit.        Meds, Allergies, FHx and SHx reviewed per nurse's intake note.        OBJECTIVE:     /70 (BP Location: Right arm, Patient Position: Chair, Cuff Size: Adult Large)   Ht 1.676 m (5' 6\")   Wt 75.3 kg (166 lb)   Breastfeeding No   BMI 26.79 kg/m      PEx:   /70 (BP Location: Right arm, Patient Position: Chair, Cuff Size: Adult Large)   Ht 1.676 m (5' 6\")   Wt 75.3 kg (166 lb)   Breastfeeding No   BMI 26.79 kg/m     Gen appearance:  Well groomed  HEENT:  EOMI, AT NC  Psych:  Normal Affect  Neuro:  A/O X 3  Skin:  Warm to touch  Resp:  No increased respiratory effort  Vasc:  RRR  lymph:  No LE edema  Abd:  Soft/NT, ND, no palpable masses  Musk:  Full ROM in extremities  :  External genitalia erythematous with mild tissue breakdown, normal architecture, with atrophic changes          Urethra normal          Stress incontinence was not demonstrated with coughing          Demonstrates incomplete procidentia at rest, cervix 2cm distal to the introitus at rest, 3cm with valsalva. Mild erythema of cervix          Pelvic floor muscles are of decreased tonicity, non tender           to localize pelvic floor muscles.  Perineum WNL.    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 She also has new vaginal discharge x2 months and reports scant spotting on pad overnight.     1. PMB (postmenopausal bleeding)  - I suspect that her spotting is more likely coming from the vulva or cervix given constant exposure to pads and irritation from moisture. However, will obtain pelvic US to rule out thickened endometrial stripe. Plan endometrial sampling if indicated by US.  - US Pelvic Transabdominal and Transvaginal; Future    2. Vaginal discharge  - clumpy and white  - Wet prep - Clinic Collect    3. Procidentia of uterus  Options discussed, including surgical correction (vaginal hysterectomy with cystocele and possible rectocele repair, sacrocolpopexy, colpocleisis) versus pessary fitting versus watchful waiting +/- pelvic floor physical therapy. She is advised that this is not dangerous, and that she can keep an eye on things and wait for any treatment until when or if she needs it. She would like to proceed with pessary fitting prior to considering more invasive management, such as surgery. I did recommend that should surgery be considered, that she have a consultation with Uro/Gyn. She actually has a visit scheduled with them in September, so I will plan to have her return for pessary fitting prior to that to see if it is functional prior to her consultation.         Darlin Nash MD   Obstetrics and Gynecology

## 2024-07-31 NOTE — RESULT ENCOUNTER NOTE
Please call Mable and notify her that she has a fibroid in her uterus that obscured the endometrial lining. I will discuss endometrial sampling with her at her next visit on 8.7. I will likely not perform it that day as the pessary fitting is a long visit, however I will set her up for close follow up at that time.     Darlin aNsh MD

## 2024-08-07 ENCOUNTER — OFFICE VISIT (OUTPATIENT)
Dept: OBGYN | Facility: CLINIC | Age: 84
End: 2024-08-07
Payer: MEDICARE

## 2024-08-07 VITALS — DIASTOLIC BLOOD PRESSURE: 68 MMHG | WEIGHT: 164.8 LBS | BODY MASS INDEX: 26.6 KG/M2 | SYSTOLIC BLOOD PRESSURE: 122 MMHG

## 2024-08-07 DIAGNOSIS — D25.1 INTRAMURAL LEIOMYOMA OF UTERUS: ICD-10-CM

## 2024-08-07 DIAGNOSIS — N95.0 PMB (POSTMENOPAUSAL BLEEDING): ICD-10-CM

## 2024-08-07 DIAGNOSIS — N81.3 PROCIDENTIA OF UTERUS: Primary | ICD-10-CM

## 2024-08-07 PROCEDURE — 57160 INSERT PESSARY/OTHER DEVICE: CPT | Performed by: OBSTETRICS & GYNECOLOGY

## 2024-08-07 PROCEDURE — 99459 PELVIC EXAMINATION: CPT | Performed by: OBSTETRICS & GYNECOLOGY

## 2024-08-07 PROCEDURE — 99213 OFFICE O/P EST LOW 20 MIN: CPT | Mod: 25 | Performed by: OBSTETRICS & GYNECOLOGY

## 2024-08-07 PROCEDURE — A4561 PESSARY RUBBER, ANY TYPE: HCPCS | Performed by: OBSTETRICS & GYNECOLOGY

## 2024-08-07 NOTE — NURSING NOTE
"Chief Complaint   Patient presents with    Pessary Check/Fit/Insert     Patient is here to have pessary fitted. No questions.        Initial /68   Wt 74.8 kg (164 lb 12.8 oz)   BMI 26.60 kg/m   Estimated body mass index is 26.6 kg/m  as calculated from the following:    Height as of 24: 1.676 m (5' 6\").    Weight as of this encounter: 74.8 kg (164 lb 12.8 oz).  BP completed using cuff size: regular    Questioned patient about current smoking habits.  Pt. Quit smoking long time ago          The following HM Due: NONE    Neris Snell CMA               "

## 2024-08-07 NOTE — PROGRESS NOTES
SUBJECTIVE:   CC: uterine prolapse                                               Paris Hull is a 84 year old  female who presents to clinic today for pessary fitting for uterine prolapse.   Secondly, she has a history of scant bleeding on pad and a non-diagnostic US for evaluation.    Problem list and histories reviewed & adjusted, as indicated.  Additional history: as documented.       Current Outpatient Medications   Medication Sig Dispense Refill    amLODIPine (NORVASC) 5 MG tablet Take 1 tablet (5 mg) by mouth daily 90 tablet 1    aspirin 81 MG tablet Take 81 mg by mouth daily      CALCIUM 600 + D 600-200 MG-UNIT OR TABS   0    eszopiclone (LUNESTA) 1 MG tablet TAKE 1 TABLET BY MOUTH 1 TO 2 TIMES A WEEK AS NEEDED FOR INSOMNIA 18 tablet 0    fish oil-omega-3 fatty acids 1000 MG capsule Take 2 g by mouth daily.      furosemide (LASIX) 20 MG tablet Take 1 tablet (20 mg) by mouth daily 90 tablet 1    lisinopril (ZESTRIL) 40 MG tablet Take 1 tablet (40 mg) by mouth daily 90 tablet 1    omeprazole (PRILOSEC) 20 MG DR capsule TAKE 1 CAPSULE(20 MG) BY MOUTH DAILY 90 capsule 1    rosuvastatin (CRESTOR) 5 MG tablet Take 1 tablet (5 mg) by mouth daily 90 tablet 1    sennosides (SENOKOT) 8.6 MG tablet Take 2 tablets by mouth daily       No current facility-administered medications for this visit.     Allergies   Allergen Reactions    No Known Allergies        OBJECTIVE:   /68   Wt 74.8 kg (164 lb 12.8 oz)   BMI 26.60 kg/m     Const: sitting in chair in no acute distress, comfortable  Pulm: no increased work of breathing, no cough  Psych: mood stable, appropriate affect  Neuro: A+Ox3   : External genitalia atrophic. Perineum erythematous with contact dermatitis. Bartholins, urethra, normal. Introital mucosa raw. At rest, the cervix is 2cm distal to the introitus, cervical tissue raw. The uterus and cervix were replaced into anatomic position prior to pessary fitting.     Pessary Fitting:    In the  lithotomy position a size 5 ring pessary with support disk was placed in the vagina and secured behind the pubic symphysis following application of trimo-long to the leading edge. The patient was asked to ambulate, bend, jump, void, and lift to evaluate for comfort and proper fit. The pessary fell out while voiding. Next a size 6 ring pessary with support disk was placed and she was asked to repeat similar maneuvers. The tolerated this size well and it stayed in place. She was then asked to remove the pessary on her own, which she was able to do in the supine position with some coaching. She then inserted the pessary with some difficulty getting it correctly positioned.     ASSESSMENT/PLAN:                                                    Paris Hull is a 84 year old female  here for the following concerns:    1. Procidentia of uterus  - ultimately she is able to place and remove a size 6 ring pessary. She will return in 1 week to assess symptoms and help with further instruction on placement and removal.     2. Fibroid uterus  -- reviewed natural history of fibroid uterus as well as management options including expectant management vs hysterectomy. Reviewed that the fibroid may be contributing to her prolapse symptoms. For now, will plan expectant management while determining if a pessary is functional for her. If not, she will likely undergo hysterectomy which will treat both her prolapse and her fibroid.    3. PMB (postmenopausal bleeding)  - I suspect the blood on her pad is eminating from the prolapsed cervix or raw tissue around her introitus, however adequate endometrial evaluation was not possible through pelvic ultrasound due to presence of large fibroid. Therefore, I have recommended endometrial sampling which will be performed at a separate visit next week.     During this visit both pessary fitting was performed as well as discussion of fibroid uterus and evaluation for endometrial biopsy in  addition to reviewing US results.     Darlin Nash MD  Obstetrics and Gynecology   [facility]

## 2024-08-13 ENCOUNTER — OFFICE VISIT (OUTPATIENT)
Dept: OBGYN | Facility: CLINIC | Age: 84
End: 2024-08-13
Payer: MEDICARE

## 2024-08-13 VITALS
BODY MASS INDEX: 26.68 KG/M2 | HEIGHT: 66 IN | SYSTOLIC BLOOD PRESSURE: 118 MMHG | WEIGHT: 166 LBS | DIASTOLIC BLOOD PRESSURE: 70 MMHG

## 2024-08-13 DIAGNOSIS — N89.8 VAGINAL DISCHARGE: Primary | ICD-10-CM

## 2024-08-13 DIAGNOSIS — N95.0 PMB (POSTMENOPAUSAL BLEEDING): ICD-10-CM

## 2024-08-13 DIAGNOSIS — N81.3 PROCIDENTIA OF UTERUS: ICD-10-CM

## 2024-08-13 LAB
BACTERIAL VAGINOSIS VAG-IMP: NEGATIVE
CANDIDA DNA VAG QL NAA+PROBE: NOT DETECTED
CANDIDA GLABRATA / CANDIDA KRUSEI DNA: NOT DETECTED
T VAGINALIS DNA VAG QL NAA+PROBE: NOT DETECTED

## 2024-08-13 PROCEDURE — 58100 BIOPSY OF UTERUS LINING: CPT | Performed by: OBSTETRICS & GYNECOLOGY

## 2024-08-13 PROCEDURE — 88305 TISSUE EXAM BY PATHOLOGIST: CPT | Performed by: PATHOLOGY

## 2024-08-13 PROCEDURE — 99213 OFFICE O/P EST LOW 20 MIN: CPT | Mod: 25 | Performed by: OBSTETRICS & GYNECOLOGY

## 2024-08-13 PROCEDURE — 0352U MULTIPLEX VAGINAL PANEL BY PCR: CPT | Performed by: OBSTETRICS & GYNECOLOGY

## 2024-08-13 NOTE — PROGRESS NOTES
"SUBJECTIVE:   CC: prolapse, postmenopausal bleeding, vaginal discharge                                               Paris Hull is a 84 year old  female who presents to clinic today to follow up pessary fitting 1 week ago, for endometrial biopsy for postmenopausal bleeding, and for further evaluation of vaginal discharge and odor. She was treated 3 weeks ago for BV but has ongoing symptoms.     She reports poor response to pessary. It feel out soon after she arrived home from fitting. She was unable to replace it. She is not interested in attempting another fitting today. She is scheduled to meet with Dr. Soto in 1 month for surgical evaluation and she will await that visit for further management.     Problem list and histories reviewed & adjusted, as indicated.  Additional history: as documented.       Current Outpatient Medications   Medication Sig Dispense Refill    amLODIPine (NORVASC) 5 MG tablet Take 1 tablet (5 mg) by mouth daily 90 tablet 1    aspirin 81 MG tablet Take 81 mg by mouth daily      CALCIUM 600 + D 600-200 MG-UNIT OR TABS   0    eszopiclone (LUNESTA) 1 MG tablet TAKE 1 TABLET BY MOUTH 1 TO 2 TIMES A WEEK AS NEEDED FOR INSOMNIA 18 tablet 0    fish oil-omega-3 fatty acids 1000 MG capsule Take 2 g by mouth daily.      furosemide (LASIX) 20 MG tablet Take 1 tablet (20 mg) by mouth daily 90 tablet 1    lisinopril (ZESTRIL) 40 MG tablet Take 1 tablet (40 mg) by mouth daily 90 tablet 1    omeprazole (PRILOSEC) 20 MG DR capsule TAKE 1 CAPSULE(20 MG) BY MOUTH DAILY 90 capsule 1    rosuvastatin (CRESTOR) 5 MG tablet Take 1 tablet (5 mg) by mouth daily 90 tablet 1    sennosides (SENOKOT) 8.6 MG tablet Take 2 tablets by mouth daily       No current facility-administered medications for this visit.     Allergies   Allergen Reactions    No Known Allergies        OBJECTIVE:   /70 (BP Location: Left arm, Patient Position: Chair, Cuff Size: Adult Regular)   Ht 1.676 m (5' 6\")   Wt 75.3 kg " (166 lb)   Breastfeeding No   BMI 26.79 kg/m     Const: sitting in chair in no acute distress, comfortable  Pulm: no increased work of breathing, no cough  Psych: mood stable, appropriate affect  Neuro: A+Ox3   : External genitalia atrophic. Contact dermatitis on perineum and gluteal cleft. At rest cervix is 3-4cm distal to introitus and cervix has mild raw area on the face at 3 o'clock    Procedure:      Endometrial biopsy:  Consent was obtained. The cervix was clearly visible outside of the introitus and did not require speculum placement. It was swabbed with betadine x3. An endometrial biopsy pipelle was then introduced at the cervical but would only pass 1cm, possibly due to the non-atomic uterine position. The uterus was reduced within the vagina and a speculum was placed. A sample of the vaginal discharge collected for multiplex. The cervix was again swabbed with betadine and the Pipelle reintroduced without success. A tenaculum was placed on the atnerior lip of the cervix for stability. Sequential rubber dilators were used and then the pipelle was reintroduced up to 6cm. The plunger was then deployed with good suction. The pipelle was rotated to collect a thorough sample. Sample was very scant, suspicious for cervical sampling rather than endometrial. The pipelle was inserted twice more and rotated on suction with minimal sample. The cervix was found to be hemostatic and the speculum was removed. The patient tolerated the procedure without difficulty.      ASSESSMENT/PLAN:                                                    Paris Hull is a 84 year old female  here for endometrial biopsy in the setting of postmenopausal bleeding.     1. Vaginal discharge  - scant white discharge, possible yeast, await results  - Multiplex Vaginal Panel by PCR    2. PMB (postmenopausal bleeding)  -I suspect that the spotting is cervical in nature, however recommended endometrial evaluation none-the-less. The US  was non-diagnostic as the endometrial stripe was obscured by the fibroid, therefore sampling was recommended. Uncertain about adequacy of today's sample.   - Surgical Pathology Exam   - follow up based on path results    3. Prolapse  - plans to abstain from a pessary at this time in favor of surgery. Is scheduled for consultation with Uro/gyn in 1 month.     Darlin Nash MD  Obstetrics and Gynecology   [facility] [place]

## 2024-08-13 NOTE — NURSING NOTE
"Chief Complaint   Patient presents with    Follow Up     pessary       Initial /70 (BP Location: Left arm, Patient Position: Chair, Cuff Size: Adult Regular)   Ht 1.676 m (5' 6\")   Wt 75.3 kg (166 lb)   Breastfeeding No   BMI 26.79 kg/m   Estimated body mass index is 26.79 kg/m  as calculated from the following:    Height as of this encounter: 1.676 m (5' 6\").    Weight as of this encounter: 75.3 kg (166 lb).  BP completed using cuff size: regular    Questioned patient about current smoking habits.  Pt. has never smoked.          The following HM Due: NONE    Anitha Miller CMA           "

## 2024-08-15 LAB
PATH REPORT.COMMENTS IMP SPEC: NORMAL
PATH REPORT.COMMENTS IMP SPEC: NORMAL
PATH REPORT.FINAL DX SPEC: NORMAL
PATH REPORT.GROSS SPEC: NORMAL
PATH REPORT.RELEVANT HX SPEC: NORMAL
PHOTO IMAGE: NORMAL

## 2024-08-15 NOTE — RESULT ENCOUNTER NOTE
Please call patient with insufficient endometrial tissue to evaluate her lining, as anticipated based on biopsy procedure. I strongly suspect that the bleeding is associated with cervical bleeding secondary to procidentia, however at this point the endometrium has been inadequately evaluated. I can offer her hysteroscopic sampling in the OR. Alternatively, she can wait and discuss with Dr. Soto when she sees her next month. If they plan hysterectomy, which I believe is her plan, they may opt to do so without endometrial sampling.     Darlin Nash MD

## 2024-08-17 ENCOUNTER — HEALTH MAINTENANCE LETTER (OUTPATIENT)
Age: 84
End: 2024-08-17

## 2024-09-15 DIAGNOSIS — G47.00 INSOMNIA, UNSPECIFIED TYPE: ICD-10-CM

## 2024-09-16 RX ORDER — ESZOPICLONE 1 MG/1
TABLET, FILM COATED ORAL
Qty: 18 TABLET | Refills: 0 | Status: SHIPPED | OUTPATIENT
Start: 2024-09-16

## 2024-10-08 ENCOUNTER — OFFICE VISIT (OUTPATIENT)
Dept: INTERNAL MEDICINE | Facility: CLINIC | Age: 84
End: 2024-10-08
Payer: MEDICARE

## 2024-10-08 VITALS
HEART RATE: 82 BPM | DIASTOLIC BLOOD PRESSURE: 70 MMHG | HEIGHT: 66 IN | OXYGEN SATURATION: 96 % | BODY MASS INDEX: 26.5 KG/M2 | SYSTOLIC BLOOD PRESSURE: 130 MMHG | TEMPERATURE: 96.3 F | RESPIRATION RATE: 13 BRPM | WEIGHT: 164.9 LBS

## 2024-10-08 DIAGNOSIS — I10 ESSENTIAL HYPERTENSION, BENIGN: ICD-10-CM

## 2024-10-08 DIAGNOSIS — M85.80 OSTEOPENIA, UNSPECIFIED LOCATION: ICD-10-CM

## 2024-10-08 DIAGNOSIS — E11.9 TYPE 2 DIABETES MELLITUS WITHOUT COMPLICATION, WITHOUT LONG-TERM CURRENT USE OF INSULIN (H): Primary | ICD-10-CM

## 2024-10-08 DIAGNOSIS — E78.2 MIXED HYPERLIPIDEMIA: ICD-10-CM

## 2024-10-08 DIAGNOSIS — N18.31 STAGE 3A CHRONIC KIDNEY DISEASE (H): ICD-10-CM

## 2024-10-08 LAB
EST. AVERAGE GLUCOSE BLD GHB EST-MCNC: 128 MG/DL
HBA1C MFR BLD: 6.1 % (ref 0–5.6)

## 2024-10-08 PROCEDURE — 90662 IIV NO PRSV INCREASED AG IM: CPT | Performed by: INTERNAL MEDICINE

## 2024-10-08 PROCEDURE — 83036 HEMOGLOBIN GLYCOSYLATED A1C: CPT | Performed by: INTERNAL MEDICINE

## 2024-10-08 PROCEDURE — 80053 COMPREHEN METABOLIC PANEL: CPT | Performed by: INTERNAL MEDICINE

## 2024-10-08 PROCEDURE — 90471 IMMUNIZATION ADMIN: CPT | Performed by: INTERNAL MEDICINE

## 2024-10-08 PROCEDURE — 82550 ASSAY OF CK (CPK): CPT | Performed by: INTERNAL MEDICINE

## 2024-10-08 PROCEDURE — 36415 COLL VENOUS BLD VENIPUNCTURE: CPT | Performed by: INTERNAL MEDICINE

## 2024-10-08 PROCEDURE — 99214 OFFICE O/P EST MOD 30 MIN: CPT | Mod: 25 | Performed by: INTERNAL MEDICINE

## 2024-10-08 RX ORDER — ROSUVASTATIN CALCIUM 5 MG/1
5 TABLET, COATED ORAL EVERY OTHER DAY
Qty: 90 TABLET | Refills: 1 | Status: SHIPPED | OUTPATIENT
Start: 2024-10-08

## 2024-10-08 RX ORDER — LISINOPRIL 40 MG/1
40 TABLET ORAL DAILY
Qty: 90 TABLET | Refills: 1 | Status: SHIPPED | OUTPATIENT
Start: 2024-10-08

## 2024-10-08 RX ORDER — AMLODIPINE BESYLATE 5 MG/1
5 TABLET ORAL DAILY
Qty: 90 TABLET | Refills: 1 | Status: SHIPPED | OUTPATIENT
Start: 2024-10-08

## 2024-10-08 NOTE — NURSING NOTE
"/72   Pulse 82   Temp (!) 96.3  F (35.7  C) (Tympanic)   Resp 13   Ht 1.676 m (5' 6\")   Wt 74.8 kg (164 lb 14.4 oz)   SpO2 96%   BMI 26.62 kg/m        Prior to immunization administration, verified patients identity using patient s name and date of birth. Please see Immunization Activity for additional information.     Screening Questionnaire for Adult Immunization    Are you sick today?   No   Do you have allergies to medications, food, a vaccine component or latex?   No   Have you ever had a serious reaction after receiving a vaccination?   No   Do you have a long-term health problem with heart, lung, kidney, or metabolic disease (e.g., diabetes), asthma, a blood disorder, no spleen, complement component deficiency, a cochlear implant, or a spinal fluid leak?  Are you on long-term aspirin therapy?   No   Do you have cancer, leukemia, HIV/AIDS, or any other immune system problem?   No   Do you have a parent, brother, or sister with an immune system problem?   No   In the past 3 months, have you taken medications that affect  your immune system, such as prednisone, other steroids, or anticancer drugs; drugs for the treatment of rheumatoid arthritis, Crohn s disease, or psoriasis; or have you had radiation treatments?   No   Have you had a seizure, or a brain or other nervous system problem?   No   During the past year, have you received a transfusion of blood or blood    products, or been given immune (gamma) globulin or antiviral drug?   No   For women: Are you pregnant or is there a chance you could become       pregnant during the next month?   No   Have you received any vaccinations in the past 4 weeks?   No     Immunization questionnaire answers were all negative.      Patient instructed to remain in clinic for 15 minutes afterwards, and to report any adverse reactions.     Screening performed by Annelise Gibson MA on 10/8/2024 at 10:48 AM.        "

## 2024-10-08 NOTE — PROGRESS NOTES
"  Assessment & Plan     (E11.9) Type 2 diabetes mellitus without complication, without long-term current use of insulin (H)  (primary encounter diagnosis)  Plan: Continue medications at this time, on ADA diet and regular exercise, check HEMOGLOBIN A1C, Comprehensive metabolic panel          (I10) Essential hypertension, benign  Comment: Blood pressure stable  Plan: amLODIPine (NORVASC) 5 MG tablet, lisinopril (ZESTRIL) 40 MG tablet refilled as directed.explained clearly about the medication,insructions and side effects.  Advised to follow low salt diet and exercise and f/u in 6 mths.        (E78.2) Mixed hyperlipidemia  Plan: LDL at goal . Patient thinks she has muscle pains with the Crestor, check CK total, and advised to try taking rosuvastatin (CRESTOR) 5 MG tablet every other day.pt was told I will contact her after CK results and proceed accordingly.            (N18.31) Stage 3a chronic kidney disease (H)  Plan: Monitor creatinine and avoid nephrotoxins    (M85.80) Osteopenia, unspecified location  Plan: Discussed medications like Fosamax/Boniva, patient states she has tried this before, patient would like to continue taking calcium and vitamin D at this time         BMI  Estimated body mass index is 26.62 kg/m  as calculated from the following:    Height as of this encounter: 1.676 m (5' 6\").    Weight as of this encounter: 74.8 kg (164 lb 14.4 oz).       The longitudinal plan of care for the diagnosis(es)/condition(s) as documented were addressed during this visit. Due to the added complexity in care, I will continue to support Mable in the subsequent management and with ongoing continuity of care.      Cat Akins is a 84 year old, presenting for the following health issues:  Diabetes, Lipids, and Hypertension      10/8/2024     9:52 AM   Additional Questions   Roomed by doug   Accompanied by self         10/8/2024     9:52 AM   Patient Reported Additional Medications   Patient reports taking the " following new medications none     HPI      Diabetes Follow-up    How often are you checking your blood sugar? A few times a week  What time of day are you checking your blood sugars (select all that apply)?  Before meals  Have you had any blood sugars above 200?  No  Have you had any blood sugars below 70?  No  What symptoms do you notice when your blood sugar is low?  None  What concerns do you have today about your diabetes? None   Do you have any of these symptoms? (Select all that apply)  No numbness or tingling in feet.  No redness, sores or blisters on feet.  No complaints of excessive thirst.  No reports of blurry vision.  No significant changes to weight.      Hyperlipidemia Follow-Up    Are you regularly taking any medication or supplement to lower your cholesterol?   Yes- rosuvastatin  Are you having muscle aches or other side effects that you think could be caused by your cholesterol lowering medication?  Yes- leg and arm muscles    Hypertension Follow-up    Do you check your blood pressure regularly outside of the clinic? Yes   Are you following a low salt diet? Yes  Are your blood pressures ever more than 140 on the top number (systolic) OR more   than 90 on the bottom number (diastolic), for example 140/90? Yes    BP Readings from Last 2 Encounters:   08/13/24 118/70   08/07/24 122/68     Hemoglobin A1C (%)   Date Value   04/16/2024 6.0 (H)   09/26/2023 6.7 (H)   07/07/2021 6.2 (H)   02/09/2010 6.1 (H)     LDL Cholesterol Calculated (mg/dL)   Date Value   04/16/2024 79   09/26/2023 62   10/07/2020 79   07/31/2019 96       H/o Osteopenia, pt would like to continue calcium and vit d      How many servings of fruits and vegetables do you eat daily?  2-3  On average, how many sweetened beverages do you drink each day (Examples: soda, juice, sweet tea, etc.  Do NOT count diet or artificially sweetened beverages)?   0  How many days per week do you exercise enough to make your heart beat faster? 3 or  "less  How many minutes a day do you exercise enough to make your heart beat faster? 9 or less  How many days per week do you miss taking your medication? 0      Past Medical History:   Diagnosis Date    Essential hypertension, benign     Mixed hyperlipidemia     Osteoporosis, unspecified     Other seborrheic keratosis     sees DERM    Tobacco use disorder     quit smoking in 09/12       Current Outpatient Medications   Medication Sig Dispense Refill    amLODIPine (NORVASC) 5 MG tablet Take 1 tablet (5 mg) by mouth daily. 90 tablet 1    aspirin 81 MG tablet Take 81 mg by mouth daily      CALCIUM 600 + D 600-200 MG-UNIT OR TABS   0    eszopiclone (LUNESTA) 1 MG tablet TAKE 1 TABLET BY MOUTH 1 TO 2 TIMES A WEEK AS NEEDED FOR INSOMNIA 18 tablet 0    fish oil-omega-3 fatty acids 1000 MG capsule Take 2 g by mouth daily.      furosemide (LASIX) 20 MG tablet Take 1 tablet (20 mg) by mouth daily 90 tablet 1    lisinopril (ZESTRIL) 40 MG tablet Take 1 tablet (40 mg) by mouth daily. 90 tablet 1    omeprazole (PRILOSEC) 20 MG DR capsule TAKE 1 CAPSULE(20 MG) BY MOUTH DAILY 90 capsule 1    rosuvastatin (CRESTOR) 5 MG tablet Take 1 tablet (5 mg) by mouth every other day. 90 tablet 1    sennosides (SENOKOT) 8.6 MG tablet Take 2 tablets by mouth daily             Review of Systems  CONSTITUTIONAL: NEGATIVE for fever, chills,   RESP: NEGATIVE for significant cough or SOB  CV: NEGATIVE for chest pain, palpitations or peripheral edema  MUSCULOSKELETAL: NEGATIVE for significant arthralgias or myalgia      Objective    /72   Pulse 82   Temp (!) 96.3  F (35.7  C) (Tympanic)   Resp 13   Ht 1.676 m (5' 6\")   Wt 74.8 kg (164 lb 14.4 oz)   SpO2 96%   BMI 26.62 kg/m    Body mass index is 26.62 kg/m .  Physical Exam   GENERAL: alert and no distress  EYES: Eyes grossly normal to inspection, PERRL and conjunctivae and sclerae normal  HENT ;ear canals and TMs clear  RESP: lungs clear to auscultation - no rales, rhonchi or " wheezes  CV: regular rate and rhythm, normal S1 S2,    MS: no gross musculoskeletal defects noted, no edema      Results for orders placed or performed in visit on 10/08/24 (from the past 24 hour(s))   HEMOGLOBIN A1C   Result Value Ref Range    Estimated Average Glucose 128 (H) <117 mg/dL    Hemoglobin A1C 6.1 (H) 0.0 - 5.6 %           Signed Electronically by: Adri Malcolm MD

## 2024-10-09 LAB
ALBUMIN SERPL BCG-MCNC: 4.4 G/DL (ref 3.5–5.2)
ALP SERPL-CCNC: 80 U/L (ref 40–150)
ALT SERPL W P-5'-P-CCNC: 15 U/L (ref 0–50)
ANION GAP SERPL CALCULATED.3IONS-SCNC: 12 MMOL/L (ref 7–15)
AST SERPL W P-5'-P-CCNC: 22 U/L (ref 0–45)
BILIRUB SERPL-MCNC: 0.5 MG/DL
BUN SERPL-MCNC: 19.2 MG/DL (ref 8–23)
CALCIUM SERPL-MCNC: 9.9 MG/DL (ref 8.8–10.4)
CHLORIDE SERPL-SCNC: 101 MMOL/L (ref 98–107)
CK SERPL-CCNC: 65 U/L (ref 26–192)
CREAT SERPL-MCNC: 1.04 MG/DL (ref 0.51–0.95)
EGFRCR SERPLBLD CKD-EPI 2021: 53 ML/MIN/1.73M2
GLUCOSE SERPL-MCNC: 107 MG/DL (ref 70–99)
HCO3 SERPL-SCNC: 27 MMOL/L (ref 22–29)
POTASSIUM SERPL-SCNC: 4 MMOL/L (ref 3.4–5.3)
PROT SERPL-MCNC: 7.4 G/DL (ref 6.4–8.3)
SODIUM SERPL-SCNC: 140 MMOL/L (ref 135–145)

## 2024-11-18 DIAGNOSIS — G47.00 INSOMNIA, UNSPECIFIED TYPE: ICD-10-CM

## 2024-11-18 DIAGNOSIS — K21.9 GASTROESOPHAGEAL REFLUX DISEASE, UNSPECIFIED WHETHER ESOPHAGITIS PRESENT: ICD-10-CM

## 2024-11-18 RX ORDER — ESZOPICLONE 1 MG/1
TABLET, FILM COATED ORAL
Qty: 18 TABLET | Refills: 0 | Status: SHIPPED | OUTPATIENT
Start: 2024-11-18

## 2025-01-13 DIAGNOSIS — G47.00 INSOMNIA, UNSPECIFIED TYPE: ICD-10-CM

## 2025-01-14 RX ORDER — ESZOPICLONE 1 MG/1
TABLET, FILM COATED ORAL
Qty: 18 TABLET | Refills: 0 | Status: SHIPPED | OUTPATIENT
Start: 2025-01-14

## 2025-01-26 ENCOUNTER — HEALTH MAINTENANCE LETTER (OUTPATIENT)
Age: 85
End: 2025-01-26

## 2025-02-18 ENCOUNTER — OFFICE VISIT (OUTPATIENT)
Dept: INTERNAL MEDICINE | Facility: CLINIC | Age: 85
End: 2025-02-18
Payer: MEDICARE

## 2025-02-18 DIAGNOSIS — I49.3 VENTRICULAR ECTOPIC BEATS: ICD-10-CM

## 2025-02-18 DIAGNOSIS — N81.3 PROCIDENTIA OF UTERUS: ICD-10-CM

## 2025-02-18 DIAGNOSIS — N18.31 STAGE 3A CHRONIC KIDNEY DISEASE (H): ICD-10-CM

## 2025-02-18 DIAGNOSIS — I10 ESSENTIAL HYPERTENSION, BENIGN: ICD-10-CM

## 2025-02-18 DIAGNOSIS — I49.3 PVC'S (PREMATURE VENTRICULAR CONTRACTIONS): ICD-10-CM

## 2025-02-18 DIAGNOSIS — Z01.818 PREOP GENERAL PHYSICAL EXAM: ICD-10-CM

## 2025-02-18 DIAGNOSIS — N95.0 PMB (POSTMENOPAUSAL BLEEDING): ICD-10-CM

## 2025-02-18 DIAGNOSIS — E11.9 TYPE 2 DIABETES MELLITUS WITHOUT COMPLICATION, WITHOUT LONG-TERM CURRENT USE OF INSULIN (H): Primary | ICD-10-CM

## 2025-02-18 DIAGNOSIS — R00.1 SINUS BRADYCARDIA: ICD-10-CM

## 2025-02-18 LAB
EST. AVERAGE GLUCOSE BLD GHB EST-MCNC: 123 MG/DL
HBA1C MFR BLD: 5.9 % (ref 0–5.6)
HGB BLD-MCNC: 12.9 G/DL (ref 11.7–15.7)
POTASSIUM SERPL-SCNC: 4.6 MMOL/L (ref 3.4–5.3)

## 2025-02-18 PROCEDURE — 84443 ASSAY THYROID STIM HORMONE: CPT | Performed by: INTERNAL MEDICINE

## 2025-02-18 PROCEDURE — 83036 HEMOGLOBIN GLYCOSYLATED A1C: CPT | Performed by: INTERNAL MEDICINE

## 2025-02-18 PROCEDURE — 93000 ELECTROCARDIOGRAM COMPLETE: CPT | Performed by: INTERNAL MEDICINE

## 2025-02-18 PROCEDURE — 36415 COLL VENOUS BLD VENIPUNCTURE: CPT | Performed by: INTERNAL MEDICINE

## 2025-02-18 PROCEDURE — 84132 ASSAY OF SERUM POTASSIUM: CPT | Performed by: INTERNAL MEDICINE

## 2025-02-18 PROCEDURE — G2211 COMPLEX E/M VISIT ADD ON: HCPCS | Performed by: INTERNAL MEDICINE

## 2025-02-18 PROCEDURE — 85018 HEMOGLOBIN: CPT | Performed by: INTERNAL MEDICINE

## 2025-02-18 PROCEDURE — 99215 OFFICE O/P EST HI 40 MIN: CPT | Performed by: INTERNAL MEDICINE

## 2025-02-18 NOTE — PROGRESS NOTES
Preoperative Evaluation  [facility]  [address]  [address]  [facility] [address]  Phone: [phone]  Primary Provider: Adri Malcolm MD  Pre-op Performing Provider: Adri Malcolm MD  Feb 18, 2025 2/18/2025   Surgical Information   What procedure is being done? partial hysterectomy    Facility or Hospital where procedure/surgery will be performed: [facility]    Who is doing the procedure / surgery? Dr. Soto    Date of surgery / procedure: 3/14/25    Time of surgery / procedure: 0750    Where do you plan to recover after surgery? at home with family        Proxy-reported     Fax number for surgical facility: Note does not need to be faxed, will be available electronically in Epic.    Assessment & Plan     The proposed surgical procedure is considered INTERMEDIATE risk.    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  (N95.0) PMB (postmenopausal bleeding)  Plan: Complete colpectomy, anterior and posterior repair, enterocele repair, levator pelvic floor muscle plication, perineorrhaphy,possible retropubic midurethral sling and cystoscopy, vaginal hysterectomy       (E11.9) Type 2 diabetes mellitus without complication, without long-term current use of insulin (H)  Plan: not on  any medications at this time, check HEMOGLOBIN A1C          (I10) Essential hypertension, benign  Plan: Blood pressure stable on lisinopril and amlodipine       (R00.1) Sinus bradycardia  (I49.3) Ventricular ectopic beats  Plan: Adult Cardiology Eval [facility] Referral           Risks and Recommendations  The patient has the following additional risks and  recommendations for perioperative complications:  Diabetes:  - Patient is not on insulin therapy: regular NPO guidelines can be followed.     Antiplatelet or Anticoagulation Medication Instructions   - aspirin: Discontinue aspirin 7 days prior to procedure to reduce bleeding risk.     Additional Medication Instructions   - ACE/ARB/ARNI (lisinopril, enalapril, losartan, valsartan, olmesartan, sacubritril/valsartan) : DO NOT TAKE on day of surgery (minimum 11 hours for general anesthesia).   - Calcium Channel Blockers (amlodipine, diltiazem, felodipine): May be continued on the day of surgery.   - Diuretics (furosemide, hydrochlorothiazide, chlorothalidone): DO NOT TAKE on the day of surgery.   - Statins (atorvastatin, simvastatin, pravastatin) : Continue taking on the day of surgery.        Recommendation  Patient referred to cardiology for evaluation before surgery for abnormal EKG . Surgery approval pending completion of consultation.      Review of the result(s) of each unique test - A1c, HGb  41 minutes spent by me on the date of the encounter doing chart review, history and exam, documentation and further activities per the note      The longitudinal plan of care for the diagnosis(es)/condition(s) as documented were addressed during this visit. Due to the added complexity in care, I will continue to support Mable in the subsequent management and with ongoing continuity of care.    Cat Akins is a 84 year old, presenting for the following:  Pre-Op Exam          2/18/2025    10:38 AM   Additional Questions   Roomed by jerry   Accompanied by self         2/18/2025    10:38 AM   Patient Reported Additional Medications   Patient reports taking the following new medications none     HPI related to upcoming procedure: Complete colpectomy, anterior and posterior repair, enterocele repair, levator pelvic floor muscle plication, perineorrhaphy,possible retropubic midurethral sling and cystoscopy, vaginal  hysterectomy          2/18/2025   Pre-Op Questionnaire   Have you ever had a heart attack or stroke? No    Have you ever had surgery on your heart or blood vessels, such as a stent placement, a coronary artery bypass, or surgery on an artery in your head, neck, heart, or legs? No    Do you have chest pain with activity? No    Do you have a history of heart failure? No    Do you currently have a cold, bronchitis or symptoms of other infection? No    Do you have a cough, shortness of breath, or wheezing? No    Do you or anyone in your family have previous history of blood clots? No    Do you or does anyone in your family have a serious bleeding problem such as prolonged bleeding following surgeries or cuts? No    Have you ever had problems with anemia or been told to take iron pills? No    Have you had any abnormal blood loss such as black, tarry or bloody stools, or abnormal vaginal bleeding? No    Have you ever had a blood transfusion? No    Are you willing to have a blood transfusion if it is medically needed before, during, or after your surgery? Yes    Have you or any of your relatives ever had problems with anesthesia? No    Do you have sleep apnea, excessive snoring or daytime drowsiness? No    Do you have any artifical heart valves or other implanted medical devices like a pacemaker, defibrillator, or continuous glucose monitor? No    Do you have artificial joints? (!) YES  Lt knee    Are you allergic to latex? No        Proxy-reported     Health Care Directive  Patient does not have a Health Care Directive: Discussed advance care planning with patient; information given to patient to review.      Status of Chronic Conditions:    DIABETES - Patient has a longstanding history of DiabetesType Type II . Patient is being treated with diet and denies significant side effects. Control has been good. Complicating factors include but are not limited to: hypertension and hyperlipidemia.     HYPERLIPIDEMIA - Patient has  a long history of  Hyperlipidemia requiring medication for treatment with recent good control. Patient reports no problems or side effects with the medication.     HYPERTENSION - Patient has longstanding history of HTN , currently denies any symptoms referable to elevated blood pressure. Specifically denies chest pain, palpitations, dyspnea, orthopnea, PND or peripheral edema. Blood pressure readings have been in normal range. Current medication regimen is as listed below. Patient denies any side effects of medication.         Patient Active Problem List    Diagnosis Date Noted    Mixed hyperlipidemia 04/16/2024     Priority: Medium    Type 2 diabetes mellitus without complication, without long-term current use of insulin (H) 10/29/2023     Priority: Medium    CKD (chronic kidney disease) stage 3, GFR 30-59 ml/min (H) 10/07/2020     Priority: Medium    Insomnia, unspecified type 10/30/2019     Priority: Medium    Edema, unspecified type 10/24/2016     Priority: Medium    Essential hypertension, benign 10/07/2015     Priority: Medium    Central retinal vein occlusion (H) 06/25/2013     Priority: Medium    Osteoporosis      Priority: Medium     Problem list name updated by automated process. Provider to review      Disorder of bone and cartilage 08/25/2003     Priority: Medium     Problem list name updated by automated process. Provider to review        Past Medical History:   Diagnosis Date    Essential hypertension, benign     Mixed hyperlipidemia     Osteoporosis, unspecified     Other seborrheic keratosis     sees DERM    Tobacco use disorder     quit smoking in 09/12     Past Surgical History:   Procedure Laterality Date    COLONOSCOPY      COLONOSCOPY N/A 6/25/2015    Procedure: COLONOSCOPY;  Surgeon: Santosh Liao MD;  Location: [facility] REMOVAL OF TONSILS,<11 Y/O       Current Outpatient Medications   Medication Sig Dispense Refill    amLODIPine (NORVASC) 5 MG tablet Take 1 tablet (5 mg) by mouth  daily. 90 tablet 1    aspirin 81 MG tablet Take 81 mg by mouth daily      CALCIUM 600 + D 600-200 MG-UNIT OR TABS   0    eszopiclone (LUNESTA) 1 MG tablet TAKE 1 TABLET BY MOUTH 1 TO 2 TIMES A WEEK AS NEEDED FOR INSOMNIA 18 tablet 0    fish oil-omega-3 fatty acids 1000 MG capsule Take 2 g by mouth daily.      furosemide (LASIX) 20 MG tablet TAKE 1 TABLET(20 MG) BY MOUTH DAILY 90 tablet 1    lisinopril (ZESTRIL) 40 MG tablet Take 1 tablet (40 mg) by mouth daily. 90 tablet 1    omeprazole (PRILOSEC) 20 MG DR capsule Take 1 capsule (20 mg) by mouth daily. 90 capsule 2    rosuvastatin (CRESTOR) 5 MG tablet Take 1 tablet (5 mg) by mouth every other day. 90 tablet 1    sennosides (SENOKOT) 8.6 MG tablet Take 2 tablets by mouth daily         Allergies   Allergen Reactions    No Known Allergies         Social History     Tobacco Use    Smoking status: Former     Current packs/day: 0.50     Types: Cigarettes    Smokeless tobacco: Former     Quit date: 2012    Tobacco comments:     Has smoked for over 30 yrs.   Substance Use Topics    Alcohol use: No     Family History   Problem Relation Age of Onset    Cardiovascular Mother         a-fib/living age 87    Gastrointestinal Disease Mother         diverticulosis    Heart Disease Father          age 80-CVA    Cardiovascular Father     Neurologic Disorder Father         parkinsons    Family History Negative Sister         age 59    Family History Negative Brother         age 67    Cancer Son     Colon Cancer No family hx of      History   Drug Use No             Review of Systems  CONSTITUTIONAL: NEGATIVE for fever, chills,   EYES: NEGATIVE for vision changes or irritation  ENT/MOUTH: NEGATIVE for ear, mouth and throat problems  RESP: NEGATIVE for significant cough or SOB  CV: NEGATIVE for chest pain, palpitations or peripheral edema  GI: NEGATIVE for nausea, abdominal pain, heartburn, or change in bowel habits  : NEGATIVE for frequency, dysuria, or  "hematuria  MUSCULOSKELETAL: NEGATIVE for significant arthralgias or myalgia  NEURO: NEGATIVE for weakness, dizziness or paresthesias  ENDOCRINE: NEGATIVE for temperature intolerance, skin/hair changes  HEME: NEGATIVE for bleeding problems  PSYCHIATRIC: NEGATIVE for changes in mood or affect    Objective    /74   Pulse 84   Temp (!) 96.6  F (35.9  C) (Tympanic)   Resp 13   Ht 1.676 m (5' 6\")   Wt 76.6 kg (168 lb 12.8 oz)   SpO2 96%   BMI 27.25 kg/m     Estimated body mass index is 27.25 kg/m  as calculated from the following:    Height as of this encounter: 1.676 m (5' 6\").    Weight as of this encounter: 76.6 kg (168 lb 12.8 oz).  Physical Exam  GENERAL: alert and no distress  EYES: Eyes grossly normal to inspection, PERRL and conjunctivae and sclerae normal  HENT: ear canals and TM's normal, nose and mouth without ulcers or lesions  RESP: lungs clear to auscultation - no rales, rhonchi or wheezes  CV: regular rate and rhythm, normal S1 S2,   ABDOMEN: soft, nontender, and bowel sounds normal  MS: no gross musculoskeletal defects noted, no edema  NEURO: Normal strength and tone, mentation intact and speech normal  PSYCH: mentation appears normal, affect normal/bright      Recent Labs   Lab Test 10/08/24  1052 04/16/24  1039   HGB  --  13.6    141   POTASSIUM 4.0 4.9   CR 1.04* 1.24*   A1C 6.1* 6.0*        Diagnostics  Recent Results (from the past 24 hours)   HEMOGLOBIN A1C    Collection Time: 02/18/25 11:44 AM   Result Value Ref Range    Estimated Average Glucose 123 (H) <117 mg/dL    Hemoglobin A1C 5.9 (H) 0.0 - 5.6 %   Hemoglobin    Collection Time: 02/18/25 11:44 AM   Result Value Ref Range    Hemoglobin 12.9 11.7 - 15.7 g/dL      EKG: sinus bradycardia HR 57,  frequent ectopic ventricular beats,  no acute ST/T changes c/w ischemia, no LVH by voltage criteria         Signed Electronically by: Adri Malcolm MD  A copy of this evaluation report is provided to the requesting " physician.

## 2025-02-19 ENCOUNTER — OFFICE VISIT (OUTPATIENT)
Dept: CARDIOLOGY | Facility: CLINIC | Age: 85
End: 2025-02-19
Attending: INTERNAL MEDICINE
Payer: MEDICARE

## 2025-02-19 VITALS
DIASTOLIC BLOOD PRESSURE: 74 MMHG | TEMPERATURE: 96.6 F | HEART RATE: 84 BPM | OXYGEN SATURATION: 96 % | WEIGHT: 168.8 LBS | RESPIRATION RATE: 13 BRPM | SYSTOLIC BLOOD PRESSURE: 132 MMHG | BODY MASS INDEX: 27.13 KG/M2 | HEIGHT: 66 IN

## 2025-02-19 VITALS
HEIGHT: 66 IN | BODY MASS INDEX: 27.05 KG/M2 | WEIGHT: 168.3 LBS | SYSTOLIC BLOOD PRESSURE: 148 MMHG | DIASTOLIC BLOOD PRESSURE: 72 MMHG | HEART RATE: 80 BPM

## 2025-02-19 DIAGNOSIS — R00.1 SINUS BRADYCARDIA: ICD-10-CM

## 2025-02-19 DIAGNOSIS — I49.3 PVC'S (PREMATURE VENTRICULAR CONTRACTIONS): Primary | ICD-10-CM

## 2025-02-19 DIAGNOSIS — I49.3 VENTRICULAR ECTOPIC BEATS: ICD-10-CM

## 2025-02-19 DIAGNOSIS — Z01.818 PREOPERATIVE EXAM FOR GYNECOLOGIC SURGERY: ICD-10-CM

## 2025-02-19 LAB — TSH SERPL DL<=0.005 MIU/L-ACNC: 3.26 UIU/ML (ref 0.3–4.2)

## 2025-02-19 PROCEDURE — 99203 OFFICE O/P NEW LOW 30 MIN: CPT | Performed by: INTERNAL MEDICINE

## 2025-02-19 RX ORDER — ESTRADIOL 0.1 MG/G
CREAM VAGINAL
COMMUNITY
Start: 2025-01-07

## 2025-02-19 NOTE — PATIENT INSTRUCTIONS
February 19, 2025    Thank you for allowing our Cardiology team to participate in your care.     Please note the following changes to your heart treatment plan:     Medication changes:   - none    Tests to be done:  - TTE (heart ultrasound)     Follow up:  - Follow up as needed      For scheduling, please call [phone].      Please contact our team through [employer] or our Nurse Team Voicemail service [phone] for questions or concerns.     General Clinic [phone]     If you are having a medical emergency, please call 911.     Sincerely,    Jack Barbosa MD, FACC  Cardiology    [facility] [place]

## 2025-02-19 NOTE — LETTER
2/19/2025    Adri Malcolm MD  [address] [facility] [address]    RE: Paris Quintanilla Kurtis       Dear Colleague,     I had the pleasure of seeing Paris Hull in the [facility].      Cardiology Clinic Consultation:    February 19, 2025   Patient Name: Paris Hull  Patient MRN: [id number]    Consult indication: abnormal ECG    HPI:    I had the opportunity to see patient Paris Hull in cardiology clinic for a consultation. Patient is followed by our colleague Adri Malcolm MD with Primary Care.     Patient is a pleasant 84-year-old female with a past medical history significant for type 2 diabetes, hypertension, CKD, dyslipidemia, who is planned for complex gynecologic surgery and presents for further evaluation and management of an abnormal ECG.    At baseline patient is very active.  She lives independently, walks for exercise approximately 20 minutes a day, physically active otherwise, for example shoveled snow last week.  She denies any chest pain, chest pressure, abnormal shortness of breath.  No palpitations.  No dizziness/lightheadedness.    Patient had been experiencing postmenopausal bleeding, plan is for partial hysterectomy and complex gynecologic surgery.    As part of pre-operative evaluation patient was assessed with an ECG, reviewed personally, demonstrating sinus rhythm at 57 bpm, there are 2 PVCs of different morphologies.  Otherwise normal intervals.  No acute ischemic changes.  ECG from 3/30/2016 demonstrates similar findings, sinus rhythm, 2 PVCs of the same morphology.    Patient does note that she likely does not consume enough water throughout the day, she does have 3 cups of coffee a day.  No alcohol use.    Assessment and Plan/Recommendations:    # PVCs, asymptomatic    Clinical significance of these PVCs is not entirely clear, she has had PVCs since 2016, asymptomatic, otherwise very physically active, particularly for her age,  engaging in activities equivalent to at least 4 METS without issue.  Advised to increase fluid intake throughout the day, decrease caffeine intake.  Will assess with a TTE.  If no significant abnormalities, no further cardiac diagnostic testing required prior to proceeding with the surgery.        Thank you for allowing our team to participate in the care of Paris Hull.  Please do not hesitate to call or page me with any questions or concerns.    Sincerely,     Jakc Barbosa MD, [facility]  Cardiology  February 19, 2025      Adri Malcolm MD  [address] [place] [address]    Voice recognition software utilized.         Past Medical History:     The ASCVD Risk score (Leighton DK, et al., 2019) failed to calculate for the following reasons:    The 2019 ASCVD risk score is only valid for ages 40 to 79  Patient Active Problem List   Diagnosis     Disorder of bone and cartilage     Osteoporosis     Central retinal vein occlusion (H)     Essential hypertension, benign     Edema, unspecified type     Insomnia, unspecified type     CKD (chronic kidney disease) stage 3, GFR 30-59 ml/min (H)     Type 2 diabetes mellitus without complication, without long-term current use of insulin (H)     Mixed hyperlipidemia       Past Surgical History:   Past Surgical History:   Procedure Laterality Date     COLONOSCOPY       COLONOSCOPY N/A 6/25/2015    Procedure: COLONOSCOPY;  Surgeon: Santosh Liao MD;  Location:  GI     HC REMOVAL OF TONSILS,<13 Y/O         Medications (outpatient):  Current Outpatient Medications   Medication Sig Dispense Refill     amLODIPine (NORVASC) 5 MG tablet Take 1 tablet (5 mg) by mouth daily. 90 tablet 1     aspirin 81 MG tablet Take 81 mg by mouth daily       CALCIUM 600 + D 600-200 MG-UNIT OR TABS   0     estradiol (ESTRACE) 0.1 MG/GM vaginal cream        eszopiclone (LUNESTA) 1 MG tablet TAKE 1 TABLET BY MOUTH 1 TO 2 TIMES A WEEK AS NEEDED FOR INSOMNIA 18  "tablet 0     fish oil-omega-3 fatty acids 1000 MG capsule Take 2 g by mouth daily.       furosemide (LASIX) 20 MG tablet TAKE 1 TABLET(20 MG) BY MOUTH DAILY 90 tablet 1     lisinopril (ZESTRIL) 40 MG tablet Take 1 tablet (40 mg) by mouth daily. 90 tablet 1     omeprazole (PRILOSEC) 20 MG DR capsule Take 1 capsule (20 mg) by mouth daily. 90 capsule 2     rosuvastatin (CRESTOR) 5 MG tablet Take 1 tablet (5 mg) by mouth every other day. 90 tablet 1     sennosides (SENOKOT) 8.6 MG tablet Take 2 tablets by mouth daily         Allergies:  Allergies   Allergen Reactions     No Known Allergies        Social History:   History   Drug Use No      History   Smoking Status     Former     Types: Cigarettes   Smokeless Tobacco     Former     Quit date: 2012     Social History    Substance and Sexual Activity      Alcohol use: No       Family History:  Family History   Problem Relation Age of Onset     Cardiovascular Mother         a-fib/living age 87     Gastrointestinal Disease Mother         diverticulosis     Heart Disease Father          age 80-CVA     Cardiovascular Father      Neurologic Disorder Father         parkinsons     Family History Negative Sister         age 59     Family History Negative Brother         age 67     Cancer Son      Colon Cancer No family hx of        Review of Systems:   A comprehensive 12 system review of systems was carried out.  Pertinent positives and negatives are noted above. Otherwise negative for contributory information.    Objective & Physical Exam:  BP (!) 148/72   Pulse 80   Ht 1.676 m (5' 6\")   Wt 76.3 kg (168 lb 4.8 oz)   BMI 27.16 kg/m    Wt Readings from Last 2 Encounters:   25 76.3 kg (168 lb 4.8 oz)   25 76.6 kg (168 lb 12.8 oz)     Body mass index is 27.16 kg/m .   Body surface area is 1.88 meters squared.    Constitutional: appears stated age, in no apparent distress, appears to be well nourished  Pulmonary: clear to auscultation bilaterally, no " wheezes, no rales, no increased work of breathing  Cardiovascular: JVP normal, regular rate, regular rhythm, no murmur appreciated, no lower extremity edema    Data reviewed:  Lab Results   Component Value Date    HGB 12.9 02/18/2025    HGB 13.6 10/30/2019     Sodium   Date Value Ref Range Status   10/08/2024 140 135 - 145 mmol/L Final   04/06/2021 138 133 - 144 mmol/L Final     Potassium   Date Value Ref Range Status   02/18/2025 4.6 3.4 - 5.3 mmol/L Final   10/11/2022 4.9 3.4 - 5.3 mmol/L Final   04/06/2021 4.6 3.4 - 5.3 mmol/L Final     Chloride   Date Value Ref Range Status   10/08/2024 101 98 - 107 mmol/L Final   10/11/2022 107 94 - 109 mmol/L Final   04/06/2021 105 94 - 109 mmol/L Final     Carbon Dioxide   Date Value Ref Range Status   04/06/2021 30 20 - 32 mmol/L Final     Carbon Dioxide (CO2)   Date Value Ref Range Status   10/08/2024 27 22 - 29 mmol/L Final   10/11/2022 30 20 - 32 mmol/L Final     Anion Gap   Date Value Ref Range Status   10/08/2024 12 7 - 15 mmol/L Final   10/11/2022 2 (L) 3 - 14 mmol/L Final   04/06/2021 3 3 - 14 mmol/L Final     Glucose   Date Value Ref Range Status   10/08/2024 107 (H) 70 - 99 mg/dL Final   10/11/2022 112 (H) 70 - 99 mg/dL Final   07/07/2021 97 70 - 99 mg/dL Final     Comment:     Non Fasting     Urea Nitrogen   Date Value Ref Range Status   10/08/2024 19.2 8.0 - 23.0 mg/dL Final   10/11/2022 17 7 - 30 mg/dL Final   07/07/2021 19 7 - 30 mg/dL Final     Creatinine   Date Value Ref Range Status   10/08/2024 1.04 (H) 0.51 - 0.95 mg/dL Final   07/07/2021 1.17 (H) 0.52 - 1.04 mg/dL Final     GFR Estimate   Date Value Ref Range Status   10/08/2024 53 (L) >60 mL/min/1.73m2 Final     Comment:     eGFR calculated using 2021 CKD-EPI equation.   07/07/2021 44 (L) >60 mL/min/[1.73_m2] Final     Comment:     Non  GFR Calc  Starting 12/18/2018, serum creatinine based estimated GFR (eGFR) will be   calculated using the Chronic Kidney Disease Epidemiology Collaboration    (CKD-EPI) equation.       Calcium   Date Value Ref Range Status   10/08/2024 9.9 8.8 - 10.4 mg/dL Final     Comment:     Reference intervals for this test were updated on 7/16/2024 to reflect our healthy population more accurately. There may be differences in the flagging of prior results with similar values performed with this method. Those prior results can be interpreted in the context of the updated reference intervals.   04/06/2021 9.7 8.5 - 10.1 mg/dL Final     Bilirubin Total   Date Value Ref Range Status   10/08/2024 0.5 <=1.2 mg/dL Final   04/06/2021 1.3 0.2 - 1.3 mg/dL Final     Alkaline Phosphatase   Date Value Ref Range Status   10/08/2024 80 40 - 150 U/L Final   04/06/2021 67 40 - 150 U/L Final     ALT   Date Value Ref Range Status   10/08/2024 15 0 - 50 U/L Final   04/06/2021 27 0 - 50 U/L Final     AST   Date Value Ref Range Status   10/08/2024 22 0 - 45 U/L Final   04/06/2021 17 0 - 45 U/L Final     Recent Labs   Lab Test 04/16/24  1039 09/26/23  1032   CHOL 174 160   HDL 69 47*   LDL 79 62   TRIG 129 253*      Lab Results   Component Value Date    A1C 5.9 02/18/2025    A1C 6.1 10/08/2024    A1C 6.0 04/16/2024    A1C 6.7 09/26/2023    A1C 6.4 04/12/2023    A1C 6.2 07/07/2021    A1C 6.1 02/09/2010    A1C 5.3 12/19/2007        No results found for this or any previous visit (from the past 4320 hours).     Thank you for allowing me to participate in the care of your patient.      Sincerely,     Jack Barbosa MD     [facility]  cc:   Adri Malcolm MD  [address] [place] [address]

## 2025-02-19 NOTE — NURSING NOTE
"/74   Pulse 84   Temp (!) 96.6  F (35.9  C) (Tympanic)   Resp 13   Ht 1.676 m (5' 6\")   Wt 76.6 kg (168 lb 12.8 oz)   SpO2 96%   BMI 27.25 kg/m      "

## 2025-02-19 NOTE — PROGRESS NOTES
Cardiology Clinic Consultation:    February 19, 2025   Patient Name: Paris Hull  Patient MRN: [id number]    Consult indication: abnormal ECG    HPI:    I had the opportunity to see patient Paris Hull in cardiology clinic for a consultation. Patient is followed by our colleague Adri Malcolm MD with Primary Care.     Patient is a pleasant 84-year-old female with a past medical history significant for type 2 diabetes, hypertension, CKD, dyslipidemia, who is planned for complex gynecologic surgery and presents for further evaluation and management of an abnormal ECG.    At baseline patient is very active.  She lives independently, walks for exercise approximately 20 minutes a day, physically active otherwise, for example shoveled snow last week.  She denies any chest pain, chest pressure, abnormal shortness of breath.  No palpitations.  No dizziness/lightheadedness.    Patient had been experiencing postmenopausal bleeding, plan is for partial hysterectomy and complex gynecologic surgery.    As part of pre-operative evaluation patient was assessed with an ECG, reviewed personally, demonstrating sinus rhythm at 57 bpm, there are 2 PVCs of different morphologies.  Otherwise normal intervals.  No acute ischemic changes.  ECG from 3/30/2016 demonstrates similar findings, sinus rhythm, 2 PVCs of the same morphology.    Patient does note that she likely does not consume enough water throughout the day, she does have 3 cups of coffee a day.  No alcohol use.    Assessment and Plan/Recommendations:    # PVCs, asymptomatic    Clinical significance of these PVCs is not entirely clear, she has had PVCs since 2016, asymptomatic, otherwise very physically active, particularly for her age, engaging in activities equivalent to at least 4 METS without issue.  Advised to increase fluid intake throughout the day, decrease caffeine intake.  Will assess with a TTE.  If no significant abnormalities, no further  cardiac diagnostic testing required prior to proceeding with the surgery.        Thank you for allowing our team to participate in the care of Paris Hull.  Please do not hesitate to call or page me with any questions or concerns.    Sincerely,     Jack Barbosa MD, [facility]  Cardiology  February 19, 2025      Adri Malcolm MD  [address] [place] [address]    Voice recognition software utilized.         Past Medical History:     The ASCVD Risk score (Leighton DK, et al., 2019) failed to calculate for the following reasons:    The 2019 ASCVD risk score is only valid for ages 40 to 79  Patient Active Problem List   Diagnosis    Disorder of bone and cartilage    Osteoporosis    Central retinal vein occlusion (H)    Essential hypertension, benign    Edema, unspecified type    Insomnia, unspecified type    CKD (chronic kidney disease) stage 3, GFR 30-59 ml/min (H)    Type 2 diabetes mellitus without complication, without long-term current use of insulin (H)    Mixed hyperlipidemia       Past Surgical History:   Past Surgical History:   Procedure Laterality Date    COLONOSCOPY      COLONOSCOPY N/A 6/25/2015    Procedure: COLONOSCOPY;  Surgeon: Santosh Liao MD;  Location:  GI    HC REMOVAL OF TONSILS,<13 Y/O         Medications (outpatient):  Current Outpatient Medications   Medication Sig Dispense Refill    amLODIPine (NORVASC) 5 MG tablet Take 1 tablet (5 mg) by mouth daily. 90 tablet 1    aspirin 81 MG tablet Take 81 mg by mouth daily      CALCIUM 600 + D 600-200 MG-UNIT OR TABS   0    estradiol (ESTRACE) 0.1 MG/GM vaginal cream       eszopiclone (LUNESTA) 1 MG tablet TAKE 1 TABLET BY MOUTH 1 TO 2 TIMES A WEEK AS NEEDED FOR INSOMNIA 18 tablet 0    fish oil-omega-3 fatty acids 1000 MG capsule Take 2 g by mouth daily.      furosemide (LASIX) 20 MG tablet TAKE 1 TABLET(20 MG) BY MOUTH DAILY 90 tablet 1    lisinopril (ZESTRIL) 40 MG tablet Take 1 tablet (40 mg) by mouth  "daily. 90 tablet 1    omeprazole (PRILOSEC) 20 MG DR capsule Take 1 capsule (20 mg) by mouth daily. 90 capsule 2    rosuvastatin (CRESTOR) 5 MG tablet Take 1 tablet (5 mg) by mouth every other day. 90 tablet 1    sennosides (SENOKOT) 8.6 MG tablet Take 2 tablets by mouth daily         Allergies:  Allergies   Allergen Reactions    No Known Allergies        Social History:   History   Drug Use No      History   Smoking Status    Former    Types: Cigarettes   Smokeless Tobacco    Former    Quit date: 2012     Social History    Substance and Sexual Activity      Alcohol use: No       Family History:  Family History   Problem Relation Age of Onset    Cardiovascular Mother         a-fib/living age 87    Gastrointestinal Disease Mother         diverticulosis    Heart Disease Father          age 80-CVA    Cardiovascular Father     Neurologic Disorder Father         parkinsons    Family History Negative Sister         age 59    Family History Negative Brother         age 67    Cancer Son     Colon Cancer No family hx of        Review of Systems:   A comprehensive 12 system review of systems was carried out.  Pertinent positives and negatives are noted above. Otherwise negative for contributory information.    Objective & Physical Exam:  BP (!) 148/72   Pulse 80   Ht 1.676 m (5' 6\")   Wt 76.3 kg (168 lb 4.8 oz)   BMI 27.16 kg/m    Wt Readings from Last 2 Encounters:   25 76.3 kg (168 lb 4.8 oz)   25 76.6 kg (168 lb 12.8 oz)     Body mass index is 27.16 kg/m .   Body surface area is 1.88 meters squared.    Constitutional: appears stated age, in no apparent distress, appears to be well nourished  Pulmonary: clear to auscultation bilaterally, no wheezes, no rales, no increased work of breathing  Cardiovascular: JVP normal, regular rate, regular rhythm, no murmur appreciated, no lower extremity edema    Data reviewed:  Lab Results   Component Value Date    HGB 12.9 2025    HGB 13.6 10/30/2019 "     Sodium   Date Value Ref Range Status   10/08/2024 140 135 - 145 mmol/L Final   04/06/2021 138 133 - 144 mmol/L Final     Potassium   Date Value Ref Range Status   02/18/2025 4.6 3.4 - 5.3 mmol/L Final   10/11/2022 4.9 3.4 - 5.3 mmol/L Final   04/06/2021 4.6 3.4 - 5.3 mmol/L Final     Chloride   Date Value Ref Range Status   10/08/2024 101 98 - 107 mmol/L Final   10/11/2022 107 94 - 109 mmol/L Final   04/06/2021 105 94 - 109 mmol/L Final     Carbon Dioxide   Date Value Ref Range Status   04/06/2021 30 20 - 32 mmol/L Final     Carbon Dioxide (CO2)   Date Value Ref Range Status   10/08/2024 27 22 - 29 mmol/L Final   10/11/2022 30 20 - 32 mmol/L Final     Anion Gap   Date Value Ref Range Status   10/08/2024 12 7 - 15 mmol/L Final   10/11/2022 2 (L) 3 - 14 mmol/L Final   04/06/2021 3 3 - 14 mmol/L Final     Glucose   Date Value Ref Range Status   10/08/2024 107 (H) 70 - 99 mg/dL Final   10/11/2022 112 (H) 70 - 99 mg/dL Final   07/07/2021 97 70 - 99 mg/dL Final     Comment:     Non Fasting     Urea Nitrogen   Date Value Ref Range Status   10/08/2024 19.2 8.0 - 23.0 mg/dL Final   10/11/2022 17 7 - 30 mg/dL Final   07/07/2021 19 7 - 30 mg/dL Final     Creatinine   Date Value Ref Range Status   10/08/2024 1.04 (H) 0.51 - 0.95 mg/dL Final   07/07/2021 1.17 (H) 0.52 - 1.04 mg/dL Final     GFR Estimate   Date Value Ref Range Status   10/08/2024 53 (L) >60 mL/min/1.73m2 Final     Comment:     eGFR calculated using 2021 CKD-EPI equation.   07/07/2021 44 (L) >60 mL/min/[1.73_m2] Final     Comment:     Non  GFR Calc  Starting 12/18/2018, serum creatinine based estimated GFR (eGFR) will be   calculated using the Chronic Kidney Disease Epidemiology Collaboration   (CKD-EPI) equation.       Calcium   Date Value Ref Range Status   10/08/2024 9.9 8.8 - 10.4 mg/dL Final     Comment:     Reference intervals for this test were updated on 7/16/2024 to reflect our healthy population more accurately. There may be  differences in the flagging of prior results with similar values performed with this method. Those prior results can be interpreted in the context of the updated reference intervals.   04/06/2021 9.7 8.5 - 10.1 mg/dL Final     Bilirubin Total   Date Value Ref Range Status   10/08/2024 0.5 <=1.2 mg/dL Final   04/06/2021 1.3 0.2 - 1.3 mg/dL Final     Alkaline Phosphatase   Date Value Ref Range Status   10/08/2024 80 40 - 150 U/L Final   04/06/2021 67 40 - 150 U/L Final     ALT   Date Value Ref Range Status   10/08/2024 15 0 - 50 U/L Final   04/06/2021 27 0 - 50 U/L Final     AST   Date Value Ref Range Status   10/08/2024 22 0 - 45 U/L Final   04/06/2021 17 0 - 45 U/L Final     Recent Labs   Lab Test 04/16/24  1039 09/26/23  1032   CHOL 174 160   HDL 69 47*   LDL 79 62   TRIG 129 253*      Lab Results   Component Value Date    A1C 5.9 02/18/2025    A1C 6.1 10/08/2024    A1C 6.0 04/16/2024    A1C 6.7 09/26/2023    A1C 6.4 04/12/2023    A1C 6.2 07/07/2021    A1C 6.1 02/09/2010    A1C 5.3 12/19/2007        No results found for this or any previous visit (from the past 4320 hours).

## 2025-02-28 ENCOUNTER — HOSPITAL ENCOUNTER (OUTPATIENT)
Dept: CARDIOLOGY | Facility: CLINIC | Age: 85
Discharge: HOME | End: 2025-02-28
Attending: INTERNAL MEDICINE | Admitting: INTERNAL MEDICINE
Payer: MEDICARE

## 2025-02-28 DIAGNOSIS — I49.3 VENTRICULAR ECTOPIC BEATS: ICD-10-CM

## 2025-02-28 DIAGNOSIS — Z01.818 PREOPERATIVE EXAM FOR GYNECOLOGIC SURGERY: ICD-10-CM

## 2025-02-28 DIAGNOSIS — I49.3 PVC'S (PREMATURE VENTRICULAR CONTRACTIONS): ICD-10-CM

## 2025-02-28 LAB — LVEF ECHO: NORMAL

## 2025-02-28 PROCEDURE — 93306 TTE W/DOPPLER COMPLETE: CPT

## 2025-02-28 PROCEDURE — 93306 TTE W/DOPPLER COMPLETE: CPT | Mod: 26 | Performed by: INTERNAL MEDICINE

## 2025-03-03 NOTE — RESULT ENCOUNTER NOTE
Results reviewed, please let the patient know that overall findings are reassuring, normal cardiac function, LVEF 55-60%, only mild MR and mild AI.     No further cardiac diagnostic testing required prior to proceeding with the surgery. Please send this to her PCP thanks

## 2025-03-04 ENCOUNTER — TELEPHONE (OUTPATIENT)
Dept: CARDIOLOGY | Facility: CLINIC | Age: 85
End: 2025-03-04
Payer: MEDICARE

## 2025-03-04 NOTE — TELEPHONE ENCOUNTER
----- Message from Jack Barbosa sent at 3/3/2025  4:07 PM CST -----  Results reviewed, please let the patient know that overall findings are reassuring, normal cardiac function, LVEF 55-60%, only mild MR and mild AI.     No further cardiac diagnostic testing required prior to proceeding with the surgery. Please send this to her PCP thanks       Spoke to patient, reviewed message from Dr Barbosa. Message sent to Dr Malcolm in Epic.

## 2025-03-07 RX ORDER — ACETAMINOPHEN 500 MG
500-1000 TABLET ORAL EVERY 6 HOURS PRN
COMMUNITY

## 2025-03-11 NOTE — PHARMACY-ADMISSION MEDICATION HISTORY
Admission Medication History    Nurse Telma Gonzales RN Fri Mar 7, 2025  4:35 PM     PTA Med List   Medication Sig Last Dose/Taking    acetaminophen (TYLENOL) 500 MG tablet Take 500-1,000 mg by mouth every 6 hours as needed for mild pain. Taking As Needed    amLODIPine (NORVASC) 5 MG tablet Take 1 tablet (5 mg) by mouth daily. Taking    aspirin 81 MG tablet Take 81 mg by mouth daily 3/6/2025    CALCIUM 600 + D 600-200 MG-UNIT OR TABS Take by mouth daily. Taking    estradiol (ESTRACE) 0.1 MG/GM vaginal cream Place vaginally three times a week. Taking    eszopiclone (LUNESTA) 1 MG tablet TAKE 1 TABLET BY MOUTH 1 TO 2 TIMES A WEEK AS NEEDED FOR INSOMNIA Taking    fish oil-omega-3 fatty acids 1000 MG capsule Take 2 g by mouth daily. Taking    furosemide (LASIX) 20 MG tablet TAKE 1 TABLET(20 MG) BY MOUTH DAILY Taking    lisinopril (ZESTRIL) 40 MG tablet Take 1 tablet (40 mg) by mouth daily. Taking    omeprazole (PRILOSEC) 20 MG DR capsule Take 1 capsule (20 mg) by mouth daily. Taking    rosuvastatin (CRESTOR) 5 MG tablet Take 1 tablet (5 mg) by mouth every other day. Taking    sennosides (SENOKOT) 8.6 MG tablet Take 2 tablets by mouth daily Taking

## 2025-03-14 ENCOUNTER — HOSPITAL ENCOUNTER (INPATIENT)
Facility: CLINIC | Age: 85
LOS: 1 days | Discharge: HOME | DRG: 743 | End: 2025-03-15
Attending: OBSTETRICS & GYNECOLOGY | Admitting: OBSTETRICS & GYNECOLOGY
Payer: MEDICARE

## 2025-03-14 DIAGNOSIS — Z98.890 POST-OPERATIVE STATE: Primary | ICD-10-CM

## 2025-03-14 LAB
ANION GAP SERPL CALCULATED.3IONS-SCNC: 11 MMOL/L (ref 7–15)
BUN SERPL-MCNC: 16 MG/DL (ref 8–23)
CALCIUM SERPL-MCNC: 8.4 MG/DL (ref 8.8–10.4)
CHLORIDE SERPL-SCNC: 103 MMOL/L (ref 98–107)
CREAT SERPL-MCNC: 0.98 MG/DL (ref 0.51–0.95)
EGFRCR SERPLBLD CKD-EPI 2021: 57 ML/MIN/1.73M2
GLUCOSE BLDC GLUCOMTR-MCNC: 103 MG/DL (ref 70–99)
GLUCOSE BLDC GLUCOMTR-MCNC: 133 MG/DL (ref 70–99)
GLUCOSE BLDC GLUCOMTR-MCNC: 180 MG/DL (ref 70–99)
GLUCOSE SERPL-MCNC: 176 MG/DL (ref 70–99)
HCO3 SERPL-SCNC: 24 MMOL/L (ref 22–29)
HGB BLD-MCNC: 11.8 G/DL (ref 11.7–15.7)
POTASSIUM SERPL-SCNC: 4.1 MMOL/L (ref 3.4–5.3)
SODIUM SERPL-SCNC: 138 MMOL/L (ref 135–145)

## 2025-03-14 PROCEDURE — 250N000011 HC RX IP 250 OP 636: Performed by: OBSTETRICS & GYNECOLOGY

## 2025-03-14 PROCEDURE — 0UT17ZZ RESECTION OF LEFT OVARY, VIA NATURAL OR ARTIFICIAL OPENING: ICD-10-PCS | Performed by: OBSTETRICS & GYNECOLOGY

## 2025-03-14 PROCEDURE — 0TJD8ZZ INSPECTION OF URETHRA, VIA NATURAL OR ARTIFICIAL OPENING ENDOSCOPIC: ICD-10-PCS | Performed by: OBSTETRICS & GYNECOLOGY

## 2025-03-14 PROCEDURE — 710N000009 HC RECOVERY PHASE 1, LEVEL 1, PER MIN: Performed by: OBSTETRICS & GYNECOLOGY

## 2025-03-14 PROCEDURE — 370N000017 HC ANESTHESIA TECHNICAL FEE, PER MIN: Performed by: OBSTETRICS & GYNECOLOGY

## 2025-03-14 PROCEDURE — 36415 COLL VENOUS BLD VENIPUNCTURE: CPT | Performed by: OBSTETRICS & GYNECOLOGY

## 2025-03-14 PROCEDURE — 250N000013 HC RX MED GY IP 250 OP 250 PS 637: Performed by: OBSTETRICS & GYNECOLOGY

## 2025-03-14 PROCEDURE — 0UQG7ZZ REPAIR VAGINA, VIA NATURAL OR ARTIFICIAL OPENING: ICD-10-PCS | Performed by: OBSTETRICS & GYNECOLOGY

## 2025-03-14 PROCEDURE — 88307 TISSUE EXAM BY PATHOLOGIST: CPT | Mod: TC | Performed by: OBSTETRICS & GYNECOLOGY

## 2025-03-14 PROCEDURE — 0UT97ZZ RESECTION OF UTERUS, VIA NATURAL OR ARTIFICIAL OPENING: ICD-10-PCS | Performed by: OBSTETRICS & GYNECOLOGY

## 2025-03-14 PROCEDURE — 360N000076 HC SURGERY LEVEL 3, PER MIN: Performed by: OBSTETRICS & GYNECOLOGY

## 2025-03-14 PROCEDURE — 250N000009 HC RX 250: Performed by: OBSTETRICS & GYNECOLOGY

## 2025-03-14 PROCEDURE — 85018 HEMOGLOBIN: CPT | Performed by: OBSTETRICS & GYNECOLOGY

## 2025-03-14 PROCEDURE — 0JQB3ZZ REPAIR PERINEUM SUBCUTANEOUS TISSUE AND FASCIA, PERCUTANEOUS APPROACH: ICD-10-PCS | Performed by: OBSTETRICS & GYNECOLOGY

## 2025-03-14 PROCEDURE — 0UBG7ZZ EXCISION OF VAGINA, VIA NATURAL OR ARTIFICIAL OPENING: ICD-10-PCS | Performed by: OBSTETRICS & GYNECOLOGY

## 2025-03-14 PROCEDURE — 272N000001 HC OR GENERAL SUPPLY STERILE: Performed by: OBSTETRICS & GYNECOLOGY

## 2025-03-14 PROCEDURE — 80048 BASIC METABOLIC PNL TOTAL CA: CPT | Performed by: OBSTETRICS & GYNECOLOGY

## 2025-03-14 PROCEDURE — 250N000025 HC SEVOFLURANE, PER MIN: Performed by: OBSTETRICS & GYNECOLOGY

## 2025-03-14 PROCEDURE — 120N000001 HC R&B MED SURG/OB

## 2025-03-14 PROCEDURE — 999N000141 HC STATISTIC PRE-PROCEDURE NURSING ASSESSMENT: Performed by: OBSTETRICS & GYNECOLOGY

## 2025-03-14 PROCEDURE — C1771 REP DEV, URINARY, W/SLING: HCPCS | Performed by: OBSTETRICS & GYNECOLOGY

## 2025-03-14 PROCEDURE — 0TSD4ZZ REPOSITION URETHRA, PERCUTANEOUS ENDOSCOPIC APPROACH: ICD-10-PCS | Performed by: OBSTETRICS & GYNECOLOGY

## 2025-03-14 PROCEDURE — 258N000003 HC RX IP 258 OP 636: Performed by: OBSTETRICS & GYNECOLOGY

## 2025-03-14 PROCEDURE — 0UT77ZZ RESECTION OF BILATERAL FALLOPIAN TUBES, VIA NATURAL OR ARTIFICIAL OPENING: ICD-10-PCS | Performed by: OBSTETRICS & GYNECOLOGY

## 2025-03-14 DEVICE — TRANSVAGINAL MID-URETHRAL SLING
Type: IMPLANTABLE DEVICE | Site: VAGINA | Status: FUNCTIONAL
Brand: ADVANTAGE FIT ULTRA SYSTEM

## 2025-03-14 RX ORDER — PIPERACILLIN SODIUM, TAZOBACTAM SODIUM 2; .25 G/10ML; G/10ML
2.25 INJECTION, POWDER, LYOPHILIZED, FOR SOLUTION INTRAVENOUS EVERY 6 HOURS
Status: DISCONTINUED | OUTPATIENT
Start: 2025-03-14 | End: 2025-03-15

## 2025-03-14 RX ORDER — PROCHLORPERAZINE MALEATE 5 MG/1
5 TABLET ORAL EVERY 6 HOURS PRN
Status: DISCONTINUED | OUTPATIENT
Start: 2025-03-14 | End: 2025-03-15 | Stop reason: HOSPADM

## 2025-03-14 RX ORDER — HYDROMORPHONE HCL IN WATER/PF 6 MG/30 ML
0.2 PATIENT CONTROLLED ANALGESIA SYRINGE INTRAVENOUS EVERY 4 HOURS PRN
Status: DISCONTINUED | OUTPATIENT
Start: 2025-03-14 | End: 2025-03-15 | Stop reason: HOSPADM

## 2025-03-14 RX ORDER — MEPERIDINE HYDROCHLORIDE 25 MG/ML
12.5 INJECTION INTRAMUSCULAR; INTRAVENOUS; SUBCUTANEOUS EVERY 5 MIN PRN
Status: DISCONTINUED | OUTPATIENT
Start: 2025-03-14 | End: 2025-03-14 | Stop reason: HOSPADM

## 2025-03-14 RX ORDER — PHENAZOPYRIDINE HYDROCHLORIDE 200 MG/1
200 TABLET, FILM COATED ORAL ONCE
Status: COMPLETED | OUTPATIENT
Start: 2025-03-14 | End: 2025-03-14

## 2025-03-14 RX ORDER — ACETAMINOPHEN 325 MG/1
650 TABLET ORAL EVERY 6 HOURS
Status: DISCONTINUED | OUTPATIENT
Start: 2025-03-14 | End: 2025-03-15 | Stop reason: HOSPADM

## 2025-03-14 RX ORDER — HYDROMORPHONE HCL IN WATER/PF 6 MG/30 ML
0.1 PATIENT CONTROLLED ANALGESIA SYRINGE INTRAVENOUS EVERY 4 HOURS PRN
Status: DISCONTINUED | OUTPATIENT
Start: 2025-03-14 | End: 2025-03-15 | Stop reason: HOSPADM

## 2025-03-14 RX ORDER — POLYETHYLENE GLYCOL 3350 17 G/17G
17 POWDER, FOR SOLUTION ORAL 2 TIMES DAILY
Status: DISCONTINUED | OUTPATIENT
Start: 2025-03-14 | End: 2025-03-15 | Stop reason: HOSPADM

## 2025-03-14 RX ORDER — CEFAZOLIN SODIUM/WATER 2 G/20 ML
2 SYRINGE (ML) INTRAVENOUS SEE ADMIN INSTRUCTIONS
Status: DISCONTINUED | OUTPATIENT
Start: 2025-03-14 | End: 2025-03-14 | Stop reason: HOSPADM

## 2025-03-14 RX ORDER — DEXAMETHASONE SODIUM PHOSPHATE 4 MG/ML
4 INJECTION, SOLUTION INTRA-ARTICULAR; INTRALESIONAL; INTRAMUSCULAR; INTRAVENOUS; SOFT TISSUE
Status: DISCONTINUED | OUTPATIENT
Start: 2025-03-14 | End: 2025-03-14 | Stop reason: HOSPADM

## 2025-03-14 RX ORDER — KETOROLAC TROMETHAMINE 15 MG/ML
15 INJECTION, SOLUTION INTRAMUSCULAR; INTRAVENOUS EVERY 6 HOURS
Status: COMPLETED | OUTPATIENT
Start: 2025-03-14 | End: 2025-03-15

## 2025-03-14 RX ORDER — AMLODIPINE BESYLATE 5 MG/1
5 TABLET ORAL DAILY
Status: DISCONTINUED | OUTPATIENT
Start: 2025-03-15 | End: 2025-03-15 | Stop reason: HOSPADM

## 2025-03-14 RX ORDER — NALOXONE HYDROCHLORIDE 0.4 MG/ML
0.4 INJECTION, SOLUTION INTRAMUSCULAR; INTRAVENOUS; SUBCUTANEOUS
Status: DISCONTINUED | OUTPATIENT
Start: 2025-03-14 | End: 2025-03-15 | Stop reason: HOSPADM

## 2025-03-14 RX ORDER — PANTOPRAZOLE SODIUM 40 MG/1
40 TABLET, DELAYED RELEASE ORAL DAILY
Status: DISCONTINUED | OUTPATIENT
Start: 2025-03-15 | End: 2025-03-15 | Stop reason: HOSPADM

## 2025-03-14 RX ORDER — FUROSEMIDE 20 MG/1
20 TABLET ORAL DAILY
Status: DISCONTINUED | OUTPATIENT
Start: 2025-03-15 | End: 2025-03-15 | Stop reason: HOSPADM

## 2025-03-14 RX ORDER — LISINOPRIL 20 MG/1
40 TABLET ORAL DAILY
Status: DISCONTINUED | OUTPATIENT
Start: 2025-03-15 | End: 2025-03-15 | Stop reason: HOSPADM

## 2025-03-14 RX ORDER — HYDROMORPHONE HYDROCHLORIDE 2 MG/1
2 TABLET ORAL EVERY 4 HOURS PRN
Status: DISCONTINUED | OUTPATIENT
Start: 2025-03-14 | End: 2025-03-15 | Stop reason: HOSPADM

## 2025-03-14 RX ORDER — SENNOSIDES 8.6 MG
2 TABLET ORAL DAILY
Status: DISCONTINUED | OUTPATIENT
Start: 2025-03-14 | End: 2025-03-15 | Stop reason: HOSPADM

## 2025-03-14 RX ORDER — ACETAMINOPHEN 325 MG/1
975 TABLET ORAL ONCE
Status: DISCONTINUED | OUTPATIENT
Start: 2025-03-14 | End: 2025-03-14

## 2025-03-14 RX ORDER — LABETALOL HYDROCHLORIDE 5 MG/ML
10 INJECTION, SOLUTION INTRAVENOUS EVERY 10 MIN PRN
Status: DISCONTINUED | OUTPATIENT
Start: 2025-03-14 | End: 2025-03-14 | Stop reason: HOSPADM

## 2025-03-14 RX ORDER — ONDANSETRON 2 MG/ML
4 INJECTION INTRAMUSCULAR; INTRAVENOUS EVERY 30 MIN PRN
Status: DISCONTINUED | OUTPATIENT
Start: 2025-03-14 | End: 2025-03-14 | Stop reason: HOSPADM

## 2025-03-14 RX ORDER — HYDROMORPHONE HCL IN WATER/PF 6 MG/30 ML
0.4 PATIENT CONTROLLED ANALGESIA SYRINGE INTRAVENOUS EVERY 5 MIN PRN
Status: DISCONTINUED | OUTPATIENT
Start: 2025-03-14 | End: 2025-03-14 | Stop reason: HOSPADM

## 2025-03-14 RX ORDER — ONDANSETRON 4 MG/1
4 TABLET, ORALLY DISINTEGRATING ORAL EVERY 6 HOURS PRN
Status: DISCONTINUED | OUTPATIENT
Start: 2025-03-14 | End: 2025-03-15 | Stop reason: HOSPADM

## 2025-03-14 RX ORDER — NALOXONE HYDROCHLORIDE 0.4 MG/ML
0.2 INJECTION, SOLUTION INTRAMUSCULAR; INTRAVENOUS; SUBCUTANEOUS
Status: DISCONTINUED | OUTPATIENT
Start: 2025-03-14 | End: 2025-03-15 | Stop reason: HOSPADM

## 2025-03-14 RX ORDER — SODIUM CHLORIDE 9 MG/ML
INJECTION, SOLUTION INTRAVENOUS CONTINUOUS
Status: DISCONTINUED | OUTPATIENT
Start: 2025-03-14 | End: 2025-03-15 | Stop reason: HOSPADM

## 2025-03-14 RX ORDER — ONDANSETRON 2 MG/ML
4 INJECTION INTRAMUSCULAR; INTRAVENOUS EVERY 6 HOURS PRN
Status: DISCONTINUED | OUTPATIENT
Start: 2025-03-14 | End: 2025-03-15 | Stop reason: HOSPADM

## 2025-03-14 RX ORDER — GINSENG 100 MG
CAPSULE ORAL PRN
Status: DISCONTINUED | OUTPATIENT
Start: 2025-03-14 | End: 2025-03-14 | Stop reason: HOSPADM

## 2025-03-14 RX ORDER — FENTANYL CITRATE 50 UG/ML
50 INJECTION, SOLUTION INTRAMUSCULAR; INTRAVENOUS EVERY 5 MIN PRN
Status: DISCONTINUED | OUTPATIENT
Start: 2025-03-14 | End: 2025-03-14 | Stop reason: HOSPADM

## 2025-03-14 RX ORDER — MAGNESIUM HYDROXIDE 1200 MG/15ML
LIQUID ORAL PRN
Status: DISCONTINUED | OUTPATIENT
Start: 2025-03-14 | End: 2025-03-14 | Stop reason: HOSPADM

## 2025-03-14 RX ORDER — ACETAMINOPHEN 325 MG/1
975 TABLET ORAL ONCE
Status: COMPLETED | OUTPATIENT
Start: 2025-03-14 | End: 2025-03-14

## 2025-03-14 RX ORDER — FLUCONAZOLE 50 MG/1
50 TABLET ORAL AT BEDTIME
Status: DISCONTINUED | OUTPATIENT
Start: 2025-03-14 | End: 2025-03-15 | Stop reason: HOSPADM

## 2025-03-14 RX ORDER — ROSUVASTATIN CALCIUM 5 MG/1
5 TABLET, COATED ORAL EVERY OTHER DAY
Status: DISCONTINUED | OUTPATIENT
Start: 2025-03-15 | End: 2025-03-15 | Stop reason: HOSPADM

## 2025-03-14 RX ORDER — SIMETHICONE 80 MG
80 TABLET,CHEWABLE ORAL EVERY 6 HOURS PRN
Status: DISCONTINUED | OUTPATIENT
Start: 2025-03-14 | End: 2025-03-15 | Stop reason: HOSPADM

## 2025-03-14 RX ORDER — ALBUTEROL SULFATE 0.83 MG/ML
2.5 SOLUTION RESPIRATORY (INHALATION) EVERY 4 HOURS PRN
Status: DISCONTINUED | OUTPATIENT
Start: 2025-03-14 | End: 2025-03-14 | Stop reason: HOSPADM

## 2025-03-14 RX ORDER — ONDANSETRON 4 MG/1
4 TABLET, ORALLY DISINTEGRATING ORAL EVERY 30 MIN PRN
Status: DISCONTINUED | OUTPATIENT
Start: 2025-03-14 | End: 2025-03-14 | Stop reason: HOSPADM

## 2025-03-14 RX ORDER — SODIUM CHLORIDE, SODIUM LACTATE, POTASSIUM CHLORIDE, CALCIUM CHLORIDE 600; 310; 30; 20 MG/100ML; MG/100ML; MG/100ML; MG/100ML
INJECTION, SOLUTION INTRAVENOUS CONTINUOUS
Status: DISCONTINUED | OUTPATIENT
Start: 2025-03-14 | End: 2025-03-14 | Stop reason: HOSPADM

## 2025-03-14 RX ORDER — CEFAZOLIN SODIUM/WATER 2 G/20 ML
2 SYRINGE (ML) INTRAVENOUS
Status: COMPLETED | OUTPATIENT
Start: 2025-03-14 | End: 2025-03-14

## 2025-03-14 RX ORDER — LIDOCAINE 40 MG/G
CREAM TOPICAL
Status: DISCONTINUED | OUTPATIENT
Start: 2025-03-14 | End: 2025-03-14 | Stop reason: HOSPADM

## 2025-03-14 RX ADMIN — PIPERACILLIN AND TAZOBACTAM 2.25 G: 2; .25 INJECTION, POWDER, FOR SOLUTION INTRAVENOUS at 21:20

## 2025-03-14 RX ADMIN — ACETAMINOPHEN 650 MG: 325 TABLET, FILM COATED ORAL at 21:19

## 2025-03-14 RX ADMIN — FLUCONAZOLE 50 MG: 50 TABLET ORAL at 21:18

## 2025-03-14 RX ADMIN — POLYETHYLENE GLYCOL 3350 17 G: 17 POWDER, FOR SOLUTION ORAL at 21:20

## 2025-03-14 RX ADMIN — PHENAZOPYRIDINE 200 MG: 200 TABLET ORAL at 06:52

## 2025-03-14 RX ADMIN — ACETAMINOPHEN 975 MG: 325 TABLET, FILM COATED ORAL at 06:51

## 2025-03-14 RX ADMIN — KETOROLAC TROMETHAMINE 15 MG: 15 INJECTION, SOLUTION INTRAMUSCULAR; INTRAVENOUS at 21:19

## 2025-03-14 RX ADMIN — KETOROLAC TROMETHAMINE 15 MG: 15 INJECTION, SOLUTION INTRAMUSCULAR; INTRAVENOUS at 16:02

## 2025-03-14 RX ADMIN — PIPERACILLIN AND TAZOBACTAM 2.25 G: 2; .25 INJECTION, POWDER, FOR SOLUTION INTRAVENOUS at 16:03

## 2025-03-14 RX ADMIN — SODIUM CHLORIDE, PRESERVATIVE FREE: 5 INJECTION INTRAVENOUS at 14:37

## 2025-03-14 RX ADMIN — ACETAMINOPHEN 650 MG: 325 TABLET, FILM COATED ORAL at 16:01

## 2025-03-14 RX ADMIN — SENNOSIDES 2 TABLET: 8.6 TABLET, FILM COATED ORAL at 16:02

## 2025-03-14 ASSESSMENT — ACTIVITIES OF DAILY LIVING (ADL)
ADLS_ACUITY_SCORE: 22
ADLS_ACUITY_SCORE: 18
ADLS_ACUITY_SCORE: 18
ADLS_ACUITY_SCORE: 33
ADLS_ACUITY_SCORE: 18
ADLS_ACUITY_SCORE: 33
ADLS_ACUITY_SCORE: 18
ADLS_ACUITY_SCORE: 33
ADLS_ACUITY_SCORE: 18
ADLS_ACUITY_SCORE: 33
ADLS_ACUITY_SCORE: 33
ADLS_ACUITY_SCORE: 22
ADLS_ACUITY_SCORE: 33
ADLS_ACUITY_SCORE: 33
ADLS_ACUITY_SCORE: 22
ADLS_ACUITY_SCORE: 33

## 2025-03-14 NOTE — OP NOTE
OPERATIVE REPORT      NAME: Paris Hull  MR#: [id number]  : 1940    DATE OF OPERATION: 2025     SURGEON: Luna Soto MD      PREOPERATIVE DIAGNOSIS:   Complete uterovaginal prolapse C +7  Associated cystocele, rectocele, and enterocele.  Post-menopausal bleeding  Ovarian cysts  Uterine fibroids  Stress urinary incontinence with ISD     POSTOP DIAGNOSIS:   Complete uterovaginal prolapse  Associated cystocele, rectocele, and enterocele.  Post-menopausal bleeding  Ovarian cysts  Uterine fibroids  Stress urinary incontinence with ISD     PROCEDURES :   1. Transvaginal hysterectomy  2. Bilateral salpingectomy  3. Left oophorectomy  4. Complete colpectomy  5. Anterior repair  6. Enterocele repair  7. Posterior colporrhaphy  8. Levator myorrhaphy  9. Perineorrhaphy   10. Retropubic midurethral sling  11. Cystourethroscopy     ASSISTANT:   EA skilled first assistant, DANIELLE Zuleta, was necessary for this procedure for assistance with patient positioning, prepping, draping, surgical visualization, performance of the repairs, wound closure and dressing application. The assistant was present for the entire procedure.     ANESTHESIA:   GET     ESTIMATED BLOOD LOSS:   300 ml      IV FLUIDS:   1600 ml of crystalloid.      COMPLICATIONS:   None     DRAINS:   16-[place] Weber catheter to gravity drainage      PACKING:   Vaginal packing and perineal body wound wick     FINDINGS:     No bladder lesion or injury, normal structural anatomy, ureters patent bilaterally    Normal rectal exam at conclusion of surgery   - right ovary was visually normal appearing, I was unable to safely reach the right ovary for removal. The left ovary, and both fallopian tubes were resected.     INDICATIONS:   This patient was seen in consultation regarding vaginal/pelvic organ prolapse, PMB and incontinence. Please refer to her clinic documentation for a complete description of her evaluation treatment plan she was desirous  of a definitive surgical approach. Prior to the procedure, the risks, benefits, indications, and alternatives were discussed. Both written and verbal consent were obtained.     PROCEDURE:   The patient was brought to the operating suite. She was administered prophylactic IV antibiotics, had sequential compression devices present on her lower extremities. She was placed in the supine position and administered general anesthesia. She was now carefully positioned in the dorsal lithotomy position with her legs stationed in the Y[place]in stirrups with attention to avoiding pressure points. An exam under anesthesia was performed, which noted post-hysterectomy vagina with prominent enterocele defect, no adnexal or parametrial masses. Anorectal exam normal.  She was now sterilely prepped and draped in the usual fashion. Her bladder was drained with  straight catheterization.      Transvaginal hysterectomy.   The anterior and posterior cervical lips were grasped with Mayen clamps. The cervicovaginal junction was delineated and injected with a solution of Marcaine. A circumferential incision was then performed. The anterior cul-de-sac was entered sharply followed by the posterior cul-de-sac entry which was performed sharply. Retractors were placed. The uterosacral ligaments were now clamped and suture-ligated and tagged for later use. This was followed by clamping and suture ligation of the cardinal ligaments, broad ligament, uterine artery pedicles, and finally the utero-ovarian pedicle which was doubly clamped and doubly suture ligated. The ovaries and fallopian tubes were visualized and noted to be normal appearing. The pedicle lines were found to be hemostatic.      Left oophorectomy and bilateral salpingectomy.   The left IP ligament was isolated and clamped, the ovary excised. The pedical was suture ligated. The mesosalpinx was clamped, the fallopian tube resected, the pedicle was suture ligated.  The right fallopian  tube was also excised. The right ovary was normal appearing and left in-situ due to difficulty safely accessing the ovary due to location in the abdomen.    Complete Colpectomy.   Her vaginal cuff was grasped with an Allis clamp at each corner. The vaginal tissue was injected with local anesthetic solution to assist with hydro-dissection. The vaginal epithelium was carefully dissected off the underlying perivesical/perirectal tissue and the parietal peritoneum at the apex of the enterocele. The dissection encompassed the span of tissue from the bladder neck to the vaginal cuff and out laterally to the arcus tendineus fascia pelvis. The posterior dissection was performed from the perineal body to the vaginal cuff and out laterally to the levator muscle insertion points.     Anterior Repair.  The yaa-vesical connective tissue was plicated, using 0-vicryl suture, to the midline to provide anterior support.     Retropubic midurethral sling.  Two marks were created on the anterior abdominal wall 2.5 cm lateral to midline at the level of the pubic bone. Attention was turned to the vagina, where an Allis clamp was applied 1 cm distal from the meatus, an additional Allis clamp 2.5 cm from the meatus. The periurethral tissue was injected with a solution of Marcaine with epinephrine. A 1.5 cm incision was created between the 2 Allis clamps beneath the mid urethra in the sagittal plane. Two periurethral tunnels were then created out laterally towards the pubic bone. Once an adequate dissection had been performed, the Advantage Fit device (Helixbind) was selected and loaded. The trocar was brought through the patient's left periurethral tunnel, then back behind the pubic bone, through the space of Retzius, and out through the previously created dwayne on the anterior abdominal wall. The blue stay sheath was left in place.This was repeated in a similar fashion on the patient's right side. The urethra was diverted in  ipsilateral fashion during trocar placement.  Cystourethroscopy was now repeated with continued normal findings. The cystoscope was removed. The sling material was appropriately positioned beneath the mid urethra with no overt tension. The resultant incision was closed with a running locking suture of 2-0 Vicryl.     Enterocele repair and  Posterior colporrhaphy.   The enterocele at the apex of the vagina was obliterated using serial 0-vicryl suture in a circumferential and A/P orientation.  Multiple layers of repair/reconstruction were placed to reduce and support the enterocele defect.  The large posterior rectocele defect was now imbricated in the midline using interrupted sutures of 0-Vicryl sequentially, layering the repair in the caudad/caphalad plane and the horizontal plane with multiple layers of tissue plication.     Levator Myorrhaphy and Perineorrhaphy  A further dissection was taken out laterally in order to mobilize the levator musculature to allow for a midline plication and closure of the genital hiatus. This was performed with interrupted sutures of 0-Vicryl. The result of the repair was a 1 cm genital hiatus. The posterior repair and perineal body skin was reapproximated with a running locking suture of 2-0 Vicryl internally and a subcuticular suture of the perineal body skin externally.   A vaginal packing was placed and a perineal body wound wick placed.     Anorectal exam showed there to be no injuries or other abnormalities. She had a 16-[place] Weber catheter present to gravity drainage. Sponge, lap, and needle counts were found be correct. There were no complications from surgery. The patient was awoken from anesthesia and brought to the recovery room in excellent condition.

## 2025-03-14 NOTE — PLAN OF CARE
Summary: POD# 0 for Transvaginal hysterectomy; Bilateral salpingectomy; Left oophorectomy; Complete colpectomy; Anterior repair; Enterocele repair; Posterior colporrhaphy; Levator myorrhaphy; Perineorrhaphy; Retropubic midurethral sling; Cystourethroscopy  Orientation: A&O x 4  Vitals/Tele: VSS on 2L NC  IV Access/drains: PIV infusing NS @ 100mL/hr  Diet: Regular - tolerating oral intake well  Mobility: Not OOB this shift  GI/: Weber in place  Wound/Skin: Minimal vaginal bleeding; redness in gluteal cleft; rash on chest  Consults: Gynecology   Discharge Plan: Pending    See Flow sheets for assessment      Goal Outcome Evaluation:    Plan of Care Reviewed With: patient  Overall Patient Progress: improving  Outcome Evaluation: A&O x 4; Tolerating regular diet; CMS in tact; Minimal vaginal bleeding    Problem: Delirium  Goal: Optimal Coping  Outcome: Progressing  Goal: Improved Behavioral Control  Outcome: Progressing  Intervention: Minimize Safety Risk  Recent Flowsheet Documentation  Taken 3/14/2025 1401 by Fatoumata Galeana RN  Enhanced Safety Measures:   room near unit station   review medications for side effects with activity  Goal: Improved Attention and Thought Clarity  Outcome: Progressing  Goal: Improved Sleep  Outcome: Progressing     Problem: Adult Inpatient Plan of Care  Goal: Plan of Care Review  Description: The Plan of Care Review/Shift note should be completed every shift.  The Outcome Evaluation is a brief statement about your assessment that the patient is improving, declining, or no change.  This information will be displayed automatically on your shift  note.  Outcome: Progressing  Flowsheets (Taken 3/14/2025 1575)  Outcome Evaluation:   A&O x 4   Tolerating regular diet   CMS in tact   Minimal vaginal bleeding  Plan of Care Reviewed With: patient  Overall Patient Progress: improving  Goal: Patient-Specific Goal (Individualized)  Description: You can add care plan individualizations to a care  "plan. Examples of Individualization might be:  \"Parent requests to be called daily at 9am for status\", \"I have a hard time hearing out of my right ear\", or \"Do not touch me to wake me up as it startles  me\".  Outcome: Progressing  Goal: Absence of Hospital-Acquired Illness or Injury  Outcome: Progressing  Intervention: Identify and Manage Fall Risk  Recent Flowsheet Documentation  Taken 3/14/2025 1401 by Fatoumata Galeana RN  Safety Promotion/Fall Prevention:   activity supervised   room near nurse's station   safety round/check completed  Intervention: Prevent Skin Injury  Recent Flowsheet Documentation  Taken 3/14/2025 1401 by Fatoumata Galeana RN  Body Position: supine, head elevated  Intervention: Prevent and Manage VTE (Venous Thromboembolism) Risk  Recent Flowsheet Documentation  Taken 3/14/2025 1401 by Fatoumata Galeana RN  VTE Prevention/Management: SCDs on (sequential compression devices)  Goal: Optimal Comfort and Wellbeing  Outcome: Progressing  Goal: Readiness for Transition of Care  Outcome: Progressing     Problem: Pain Acute  Goal: Optimal Pain Control and Function  Outcome: Progressing     Problem: Hysterectomy Total or Partial  Goal: Absence of Bleeding  Outcome: Progressing  Goal: Effective Bowel Elimination  Outcome: Progressing  Goal: Fluid and Electrolyte Balance  Outcome: Progressing  Goal: Absence of Infection Signs and Symptoms  Outcome: Progressing  Goal: Anesthesia/Sedation Recovery  Outcome: Progressing  Intervention: Optimize Anesthesia Recovery  Recent Flowsheet Documentation  Taken 3/14/2025 1401 by Fatoumata Galeana RN  Safety Promotion/Fall Prevention:   activity supervised   room near nurse's station   safety round/check completed  Goal: Acceptable Pain Control  Outcome: Progressing  Goal: Nausea and Vomiting Relief  Outcome: Progressing  Goal: Effective Urinary Elimination  Outcome: Progressing  Goal: Effective Oxygenation and Ventilation  Outcome: Progressing  Intervention: Optimize " Oxygenation and Ventilation  Recent Flowsheet Documentation  Taken 3/14/2025 1401 by Fatoumata Galeana, RN  Cough And Deep Breathing: done with encouragement  Activity Management: activity adjusted per tolerance  Head of Bed (HOB) Positioning: HOB at 30 degrees

## 2025-03-15 VITALS
OXYGEN SATURATION: 96 % | HEIGHT: 66 IN | RESPIRATION RATE: 14 BRPM | WEIGHT: 167 LBS | HEART RATE: 72 BPM | BODY MASS INDEX: 26.84 KG/M2 | DIASTOLIC BLOOD PRESSURE: 66 MMHG | SYSTOLIC BLOOD PRESSURE: 131 MMHG | TEMPERATURE: 97.6 F

## 2025-03-15 LAB
CREAT SERPL-MCNC: 1.36 MG/DL (ref 0.51–0.95)
EGFRCR SERPLBLD CKD-EPI 2021: 38 ML/MIN/1.73M2
GLUCOSE BLDC GLUCOMTR-MCNC: 88 MG/DL (ref 70–99)
GLUCOSE BLDC GLUCOMTR-MCNC: 98 MG/DL (ref 70–99)
HGB BLD-MCNC: 9.5 G/DL (ref 11.7–15.7)

## 2025-03-15 PROCEDURE — 36415 COLL VENOUS BLD VENIPUNCTURE: CPT | Performed by: OBSTETRICS & GYNECOLOGY

## 2025-03-15 PROCEDURE — 82565 ASSAY OF CREATININE: CPT | Performed by: OBSTETRICS & GYNECOLOGY

## 2025-03-15 PROCEDURE — 250N000013 HC RX MED GY IP 250 OP 250 PS 637: Performed by: OBSTETRICS & GYNECOLOGY

## 2025-03-15 PROCEDURE — 258N000003 HC RX IP 258 OP 636: Performed by: OBSTETRICS & GYNECOLOGY

## 2025-03-15 PROCEDURE — 85018 HEMOGLOBIN: CPT | Performed by: OBSTETRICS & GYNECOLOGY

## 2025-03-15 PROCEDURE — 250N000011 HC RX IP 250 OP 636: Performed by: OBSTETRICS & GYNECOLOGY

## 2025-03-15 RX ORDER — HYDROMORPHONE HYDROCHLORIDE 2 MG/1
2 TABLET ORAL EVERY 4 HOURS
Qty: 20 TABLET | Refills: 0 | Status: SHIPPED | OUTPATIENT
Start: 2025-03-15 | End: 2025-03-18

## 2025-03-15 RX ORDER — IBUPROFEN 600 MG/1
600 TABLET, FILM COATED ORAL EVERY 6 HOURS PRN
Qty: 30 TABLET | Refills: 0 | Status: CANCELLED | OUTPATIENT
Start: 2025-03-15

## 2025-03-15 RX ORDER — POLYETHYLENE GLYCOL 3350 17 G/17G
17 POWDER, FOR SOLUTION ORAL DAILY
Qty: 510 G | Refills: 0 | Status: SHIPPED | OUTPATIENT
Start: 2025-03-15

## 2025-03-15 RX ADMIN — LISINOPRIL 40 MG: 20 TABLET ORAL at 08:22

## 2025-03-15 RX ADMIN — SODIUM CHLORIDE, PRESERVATIVE FREE: 5 INJECTION INTRAVENOUS at 01:09

## 2025-03-15 RX ADMIN — KETOROLAC TROMETHAMINE 15 MG: 15 INJECTION, SOLUTION INTRAMUSCULAR; INTRAVENOUS at 08:22

## 2025-03-15 RX ADMIN — ACETAMINOPHEN 650 MG: 325 TABLET, FILM COATED ORAL at 03:10

## 2025-03-15 RX ADMIN — ACETAMINOPHEN 650 MG: 325 TABLET, FILM COATED ORAL at 08:22

## 2025-03-15 RX ADMIN — PANTOPRAZOLE SODIUM 40 MG: 40 TABLET, DELAYED RELEASE ORAL at 08:22

## 2025-03-15 RX ADMIN — FUROSEMIDE 20 MG: 20 TABLET ORAL at 08:22

## 2025-03-15 RX ADMIN — AMLODIPINE BESYLATE 5 MG: 5 TABLET ORAL at 08:22

## 2025-03-15 RX ADMIN — PIPERACILLIN AND TAZOBACTAM 2.25 G: 2; .25 INJECTION, POWDER, FOR SOLUTION INTRAVENOUS at 10:11

## 2025-03-15 RX ADMIN — PIPERACILLIN AND TAZOBACTAM 2.25 G: 2; .25 INJECTION, POWDER, FOR SOLUTION INTRAVENOUS at 03:11

## 2025-03-15 RX ADMIN — KETOROLAC TROMETHAMINE 15 MG: 15 INJECTION, SOLUTION INTRAMUSCULAR; INTRAVENOUS at 03:11

## 2025-03-15 RX ADMIN — ROSUVASTATIN CALCIUM 5 MG: 5 TABLET, FILM COATED ORAL at 08:22

## 2025-03-15 ASSESSMENT — ACTIVITIES OF DAILY LIVING (ADL)
ADLS_ACUITY_SCORE: 43
ADLS_ACUITY_SCORE: 33
ADLS_ACUITY_SCORE: 43
ADLS_ACUITY_SCORE: 33
ADLS_ACUITY_SCORE: 43
ADLS_ACUITY_SCORE: 33
ADLS_ACUITY_SCORE: 33
ADLS_ACUITY_SCORE: 43
ADLS_ACUITY_SCORE: 33
ADLS_ACUITY_SCORE: 43

## 2025-03-15 NOTE — PLAN OF CARE
"A/o x4. IV removed. Weber removed. Voiding trial passed. All belongings gathered and sent with patient. Discharge instructions gone over and understanding verbalized. Meds (miralax, dilauidid) given to patient to take home. Son to transport home.           Problem: Delirium  Goal: Optimal Coping  Outcome: Met  Goal: Improved Behavioral Control  Outcome: Met  Intervention: Minimize Safety Risk  Recent Flowsheet Documentation  Taken 3/15/2025 0822 by Lena Blackmon RN  Enhanced Safety Measures: review medications for side effects with activity  Goal: Improved Attention and Thought Clarity  Outcome: Met  Goal: Improved Sleep  Outcome: Met     Problem: Adult Inpatient Plan of Care  Goal: Plan of Care Review  Description: The Plan of Care Review/Shift note should be completed every shift.  The Outcome Evaluation is a brief statement about your assessment that the patient is improving, declining, or no change.  This information will be displayed automatically on your shift  note.  Outcome: Met  Flowsheets (Taken 3/15/2025 1359)  Outcome Evaluation: discharging home  Plan of Care Reviewed With: patient  Overall Patient Progress: improving  Goal: Patient-Specific Goal (Individualized)  Description: You can add care plan individualizations to a care plan. Examples of Individualization might be:  \"Parent requests to be called daily at 9am for status\", \"I have a hard time hearing out of my right ear\", or \"Do not touch me to wake me up as it startles  me\".  Outcome: Met  Goal: Absence of Hospital-Acquired Illness or Injury  Outcome: Met  Intervention: Identify and Manage Fall Risk  Recent Flowsheet Documentation  Taken 3/15/2025 0822 by Lena Blackmon, RN  Safety Promotion/Fall Prevention:   activity supervised   assistive device/personal items within reach   clutter free environment maintained   nonskid shoes/slippers when out of bed   safety round/check completed  Intervention: Prevent Skin Injury  Recent Flowsheet " Documentation  Taken 3/15/2025 0822 by Lena Blackmon RN  Body Position: position changed independently  Intervention: Prevent and Manage VTE (Venous Thromboembolism) Risk  Recent Flowsheet Documentation  Taken 3/15/2025 0822 by Lena Blackmon RN  VTE Prevention/Management: SCDs on (sequential compression devices)  Intervention: Prevent Infection  Recent Flowsheet Documentation  Taken 3/15/2025 0822 by Lena Blackmon RN  Infection Prevention:   single patient room provided   rest/sleep promoted   hand hygiene promoted  Goal: Optimal Comfort and Wellbeing  Outcome: Met  Intervention: Monitor Pain and Promote Comfort  Recent Flowsheet Documentation  Taken 3/15/2025 0820 by Lena Blackmon RN  Pain Management Interventions: medication (see MAR)  Goal: Readiness for Transition of Care  Outcome: Met     Problem: Pain Acute  Goal: Optimal Pain Control and Function  Outcome: Met  Intervention: Develop Pain Management Plan  Recent Flowsheet Documentation  Taken 3/15/2025 0820 by Lena Blackmon RN  Pain Management Interventions: medication (see MAR)  Intervention: Prevent or Manage Pain  Recent Flowsheet Documentation  Taken 3/15/2025 0822 by Lena Blackmon RN  Medication Review/Management: medications reviewed     Problem: Hysterectomy Total or Partial  Goal: Absence of Bleeding  Outcome: Met  Goal: Effective Bowel Elimination  Outcome: Met  Goal: Fluid and Electrolyte Balance  Outcome: Met  Goal: Absence of Infection Signs and Symptoms  Outcome: Met  Goal: Anesthesia/Sedation Recovery  Outcome: Met  Intervention: Optimize Anesthesia Recovery  Recent Flowsheet Documentation  Taken 3/15/2025 0822 by Lena Blackmon RN  Safety Promotion/Fall Prevention:   activity supervised   assistive device/personal items within reach   clutter free environment maintained   nonskid shoes/slippers when out of bed   safety round/check completed  Goal: Acceptable Pain Control  Outcome: Met  Intervention: Prevent or Manage Pain  Recent  Flowsheet Documentation  Taken 3/15/2025 0820 by Lena Blackmon, RN  Pain Management Interventions: medication (see MAR)  Goal: Nausea and Vomiting Relief  Outcome: Met  Goal: Effective Urinary Elimination  Outcome: Met  Goal: Effective Oxygenation and Ventilation  Outcome: Met  Intervention: Optimize Oxygenation and Ventilation  Recent Flowsheet Documentation  Taken 3/15/2025 0822 by Lena Blackmon, RN  Activity Management: activity adjusted per tolerance  Head of Bed (HOB) Positioning: HOB at 20-30 degrees           Goal Outcome Evaluation:      Plan of Care Reviewed With: patient    Overall Patient Progress: improvingOverall Patient Progress: improving    Outcome Evaluation: discharging home

## 2025-03-15 NOTE — PROGRESS NOTES
"UROGYNECOLOGY    POST-OP DAY #1    S: Doing well   Ambulating: not out of bed yet, she wants to walk  Diet: reg  Flatus: yes  Pain control: denies pain  Vaginal Pack: removed now  Weber catheter: in     O: Vitals: /47 (BP Location: Right arm)   Pulse 62   Temp 97.8  F (36.6  C) (Oral)   Resp 14   Ht 1.676 m (5' 6\")   Wt 75.8 kg (167 lb)   SpO2 92%   BMI 26.95 kg/m    BMI= Body mass index is 26.95 kg/m .    Intake/Output Summary (Last 24 hours) at 3/15/2025 1055  Last data filed at 3/15/2025 0922  Gross per 24 hour   Intake 2078.33 ml   Output 600 ml   Net 1478.33 ml       Exam:  Appears healthy and well, A&O x3  Abdomen is soft, slight bloating, incisions C/D/I, good BS  Ext SCD, no edema  Weber catheter in  Vaginal packing and wound wick removed now  no perineal edema, incisions intact.    Labs:  HGB:  pre-op 11.8   Post-op 9.5  Creat 1.04, GFR 53 pre-op  Post-op 1.36    Glucose: 88, 133, 176    Assessment and Plan:  POD# 1  A) Post-Operative Care:  ambulate   ADAT  continue with pain control strategies.  perform voiding trial when able to ambulate without assistance- I will order for after lunch today  I reviewed post-operative instructions and precautions/ written information provided.  Discharge home anticipated this afternoon or tomorrow, based on her progress  Follow-up based on findings of voiding trial.    B) Medical:   Age related frailty- ambulation with assistance encouraged. Caution for s/sx of delirium  Treated vulvar yeast dermatitis with oral flucon x 1 dose  Renal insufficiency- UOP adequate, small bump in creat.  DM diet controlled, BS good range    Luna Soto MD    "

## 2025-03-15 NOTE — DISCHARGE INSTRUCTIONS
[facility]  Prolapse/Pelvic Reconstructive Surgery  Instructions for Caring for yourself after Surgery     How do I manage my pain?   Pain and tenderness should lessen each day. To help keep pain under control, use the following guidelines:  Apply ice packs to your perineum (vaginal and rectal area) for the 1st couple of days.  Take 200 milligrams (mg) of ibuprofen (Advil) every 6 hours or tylenol 650 mg  for the 1st several days.   Use your prescribed narcotic (hydromorphone) for additional pain relief as needed.  Do not drive, drink alcohol or make any major decisions, such as signing important papers or managing legal issues, while taking prescription pain medication.   Take pain medication with food to avoid an upset stomach.    How do I care for my perineum?  Use pads for vaginal discharge after surgery. Discharge is normal and can last several weeks. Discharge may appear bloody, yellow or white.  Do not place anything in your vagina until advised by your doctor.     What about bathing?     Do not take a tub bath, use a hot tub or swim until advised by your doctor. You may take showers.    What about bowel and bladder management?  Keep stools soft and regular. We recommend using the following medicine that loosens stools and increases bowel movements:  MiraLAX-  17 grams or a capful daily following surgery.    When urinating, do not bear down. Relax and allow the bladder muscle to contract. If you are unable to urinate, contact your doctor.  If you go home with a catheter, your doctor may prescribe an antibiotic for you to take before bed to help prevent infection. Follow up in the clinic as instructed to have the catheter removed.    What about activity?  Do not lift more than 10 pounds for 6 weeks after surgery. Avoid heavy pushing or pulling, such as vacuuming or lawn mowing. Your body s tissues need time to heal and regain maximum strength.  Keep Active. Walking is encouraged. Gradually build up  how long and far you walk. Climbing stairs is OK if able.      You may resume driving when you are no longer taking narcotic pain medication and have the strength to use the brake pedal as needed.     When do I call my doctor?  Call Dr. Soto call [phone] if you have:     (for urgent questions/concerns CELL PHONE [phone])  -Any post-operative questions or concerns   -A fever over 100.4 F (38 C)    -Difficulty emptying your bladder  -Chills       -Worsening pain              -Nausea or vomiting

## 2025-03-15 NOTE — PROGRESS NOTES
340 mL of sterile water put into bladder.  Voided 200 mL into hat. Some urine was voided on chucks and floor.  Bladder scan showed 40 mL remaining in bladder.

## 2025-03-15 NOTE — PLAN OF CARE
"Goal Outcome Evaluation:      Plan of Care Reviewed With: patient    Overall Patient Progress: improvingOverall Patient Progress: improving    Outcome Evaluation: Patient alert and oriented x4, not OOB this shift. denies pain. Minimal vaginal bleeding. VSS on RA. PIV to left hand infusing NS at 100mL/hr. Weber in place with good UOP.      Problem: Delirium  Goal: Optimal Coping  Outcome: Progressing  Goal: Improved Behavioral Control  Outcome: Progressing  Intervention: Minimize Safety Risk  Recent Flowsheet Documentation  Taken 3/15/2025 0000 by Chaka Rios, RN  Enhanced Safety Measures: review medications for side effects with activity  Taken 3/14/2025 2100 by Chaka Rios, RN  Enhanced Safety Measures: review medications for side effects with activity  Goal: Improved Attention and Thought Clarity  Outcome: Progressing  Goal: Improved Sleep  Outcome: Progressing     Problem: Adult Inpatient Plan of Care  Goal: Plan of Care Review  Description: The Plan of Care Review/Shift note should be completed every shift.  The Outcome Evaluation is a brief statement about your assessment that the patient is improving, declining, or no change.  This information will be displayed automatically on your shift  note.  Outcome: Progressing  Flowsheets (Taken 3/15/2025 0407)  Outcome Evaluation: Patient alert and oriented x4, not OOB this shift. denies pain. Minimal vaginal bleeding. VSS on RA. PIV to left hand infusing NS at 100mL/hr. Weber in place with good UOP.  Plan of Care Reviewed With: patient  Overall Patient Progress: improving  Goal: Patient-Specific Goal (Individualized)  Description: You can add care plan individualizations to a care plan. Examples of Individualization might be:  \"Parent requests to be called daily at 9am for status\", \"I have a hard time hearing out of my right ear\", or \"Do not touch me to wake me up as it startles  me\".  Outcome: Progressing  Goal: Absence of Hospital-Acquired Illness or " Injury  Outcome: Progressing  Intervention: Identify and Manage Fall Risk  Recent Flowsheet Documentation  Taken 3/15/2025 0000 by Chaka Rios RN  Safety Promotion/Fall Prevention:   activity supervised   assistive device/personal items within reach   clutter free environment maintained   increase visualization of patient   nonskid shoes/slippers when out of bed   patient and family education   safety round/check completed  Taken 3/14/2025 2100 by Chaka Rios RN  Safety Promotion/Fall Prevention:   activity supervised   assistive device/personal items within reach   clutter free environment maintained   increase visualization of patient   nonskid shoes/slippers when out of bed   patient and family education   safety round/check completed  Intervention: Prevent Skin Injury  Recent Flowsheet Documentation  Taken 3/15/2025 0000 by Chaka Rios RN  Body Position: supine, legs elevated  Taken 3/14/2025 2100 by Chaka Rios RN  Body Position: supine, legs elevated  Intervention: Prevent and Manage VTE (Venous Thromboembolism) Risk  Recent Flowsheet Documentation  Taken 3/15/2025 0000 by Chaka Rios RN  VTE Prevention/Management: SCDs on (sequential compression devices)  Taken 3/14/2025 2100 by Chaka Rios RN  VTE Prevention/Management: SCDs on (sequential compression devices)  Intervention: Prevent Infection  Recent Flowsheet Documentation  Taken 3/15/2025 0000 by Chaka Rios RN  Infection Prevention:   single patient room provided   rest/sleep promoted  Taken 3/14/2025 2100 by Chaka Rios RN  Infection Prevention:   single patient room provided   rest/sleep promoted  Goal: Optimal Comfort and Wellbeing  Outcome: Progressing  Goal: Readiness for Transition of Care  Outcome: Progressing     Problem: Pain Acute  Goal: Optimal Pain Control and Function  Outcome: Progressing  Intervention: Prevent or Manage Pain  Recent Flowsheet Documentation  Taken 3/15/2025 0000 by Chaka Rios RN  Bowel Elimination  Promotion: adequate fluid intake promoted  Taken 3/14/2025 2100 by Chaka Rios RN  Bowel Elimination Promotion: adequate fluid intake promoted     Problem: Hysterectomy Total or Partial  Goal: Absence of Bleeding  Outcome: Progressing  Goal: Effective Bowel Elimination  Outcome: Progressing  Goal: Fluid and Electrolyte Balance  Outcome: Progressing  Intervention: Monitor and Manage Fluid and Electrolyte Balance  Recent Flowsheet Documentation  Taken 3/15/2025 0000 by Chaka Rios RN  Fluid/Electrolyte Management: fluids provided  Taken 3/14/2025 2100 by Chaka Rios RN  Fluid/Electrolyte Management: fluids provided  Goal: Absence of Infection Signs and Symptoms  Outcome: Progressing  Goal: Anesthesia/Sedation Recovery  Outcome: Progressing  Intervention: Optimize Anesthesia Recovery  Recent Flowsheet Documentation  Taken 3/15/2025 0000 by Chaka Rios RN  Safety Promotion/Fall Prevention:   activity supervised   assistive device/personal items within reach   clutter free environment maintained   increase visualization of patient   nonskid shoes/slippers when out of bed   patient and family education   safety round/check completed  Taken 3/14/2025 2100 by Chaka Rios RN  Safety Promotion/Fall Prevention:   activity supervised   assistive device/personal items within reach   clutter free environment maintained   increase visualization of patient   nonskid shoes/slippers when out of bed   patient and family education   safety round/check completed  Goal: Acceptable Pain Control  Outcome: Progressing  Goal: Nausea and Vomiting Relief  Outcome: Progressing  Goal: Effective Urinary Elimination  Outcome: Progressing  Goal: Effective Oxygenation and Ventilation  Outcome: Progressing  Intervention: Optimize Oxygenation and Ventilation  Recent Flowsheet Documentation  Taken 3/15/2025 0000 by Chaka Rios RN  Cough And Deep Breathing: done independently per patient  Activity Management: activity adjusted per  tolerance  Head of Bed (HOB) Positioning: HOB at 20-30 degrees  Taken 3/14/2025 2100 by Chaka Rios, DENISE  Cough And Deep Breathing: done independently per patient  Activity Management: activity adjusted per tolerance  Head of Bed (HOB) Positioning: HOB at 20-30 degrees

## 2025-03-17 ENCOUNTER — PATIENT OUTREACH (OUTPATIENT)
Dept: CARE COORDINATION | Facility: CLINIC | Age: 85
End: 2025-03-17
Payer: MEDICARE

## 2025-03-18 LAB
PATH REPORT.COMMENTS IMP SPEC: NORMAL
PATH REPORT.COMMENTS IMP SPEC: NORMAL
PATH REPORT.FINAL DX SPEC: NORMAL
PATH REPORT.GROSS SPEC: NORMAL
PATH REPORT.MICROSCOPIC SPEC OTHER STN: NORMAL
PATH REPORT.RELEVANT HX SPEC: NORMAL
PHOTO IMAGE: NORMAL

## 2025-03-18 PROCEDURE — 88305 TISSUE EXAM BY PATHOLOGIST: CPT | Mod: 26 | Performed by: PATHOLOGY

## 2025-03-19 DIAGNOSIS — G47.00 INSOMNIA, UNSPECIFIED TYPE: ICD-10-CM

## 2025-03-19 RX ORDER — ESZOPICLONE 1 MG/1
TABLET, FILM COATED ORAL
Qty: 18 TABLET | Refills: 0 | Status: SHIPPED | OUTPATIENT
Start: 2025-03-19

## 2025-03-21 ENCOUNTER — TELEPHONE (OUTPATIENT)
Dept: INTERNAL MEDICINE | Facility: CLINIC | Age: 85
End: 2025-03-21
Payer: MEDICARE

## 2025-03-21 NOTE — TELEPHONE ENCOUNTER
Retail Pharmacy Prior Authorization Team   Phone: [phone]    PA Initiation    Medication: ESZOPICLONE 1 MG PO TABS  Insurance Company: [employer] - Phone [phone] Fax [phone]  Pharmacy Filling the Rx: [id number] [place] - [address]  Filling Pharmacy Phone: [phone]  Filling Pharmacy Fax:    Start Date: 3/21/2025    RECEIVED QUESTION SET FROM INSURANCE VIA FAX - FILLED OUT AND FAXED BACK TO INSURANCE

## 2025-03-26 NOTE — TELEPHONE ENCOUNTER
Prior Authorization Approval    Medication: ESZOPICLONE 1 MG PO TABS  Authorization Effective Date: 1/1/2025  Authorization Expiration Date: 12/31/2025     Insurance Company: [employer] - Phone [phone] Fax [phone]  Which Pharmacy is filling the prescription: [id number] - [place], [place] - [address]  Pharmacy Notified: YES  Patient Notified: YES (faxed approval letter to pharmacy and notified patient via [employer] message)    Called [facility] and spoke with Ruchi - informed me it was approved and provided approval dates. States a letter was sent out on 3/21/25.

## 2025-03-31 ENCOUNTER — PATIENT OUTREACH (OUTPATIENT)
Dept: CARE COORDINATION | Facility: CLINIC | Age: 85
End: 2025-03-31
Payer: MEDICARE

## 2025-04-01 ENCOUNTER — OFFICE VISIT (OUTPATIENT)
Dept: INTERNAL MEDICINE | Facility: CLINIC | Age: 85
End: 2025-04-01
Payer: MEDICARE

## 2025-04-01 VITALS
TEMPERATURE: 96.4 F | WEIGHT: 167.2 LBS | HEIGHT: 66 IN | RESPIRATION RATE: 12 BRPM | SYSTOLIC BLOOD PRESSURE: 160 MMHG | BODY MASS INDEX: 26.87 KG/M2 | HEART RATE: 71 BPM | DIASTOLIC BLOOD PRESSURE: 70 MMHG | OXYGEN SATURATION: 98 %

## 2025-04-01 DIAGNOSIS — I10 ESSENTIAL HYPERTENSION, BENIGN: ICD-10-CM

## 2025-04-01 DIAGNOSIS — E11.9 TYPE 2 DIABETES MELLITUS WITHOUT COMPLICATION, WITHOUT LONG-TERM CURRENT USE OF INSULIN (H): Primary | ICD-10-CM

## 2025-04-01 DIAGNOSIS — G47.00 INSOMNIA, UNSPECIFIED TYPE: ICD-10-CM

## 2025-04-01 DIAGNOSIS — E78.2 MIXED HYPERLIPIDEMIA: ICD-10-CM

## 2025-04-01 DIAGNOSIS — R60.9 EDEMA, UNSPECIFIED TYPE: ICD-10-CM

## 2025-04-01 DIAGNOSIS — N18.31 STAGE 3A CHRONIC KIDNEY DISEASE (H): ICD-10-CM

## 2025-04-01 PROCEDURE — 82570 ASSAY OF URINE CREATININE: CPT | Performed by: INTERNAL MEDICINE

## 2025-04-01 PROCEDURE — 84460 ALANINE AMINO (ALT) (SGPT): CPT | Performed by: INTERNAL MEDICINE

## 2025-04-01 PROCEDURE — 80061 LIPID PANEL: CPT | Performed by: INTERNAL MEDICINE

## 2025-04-01 PROCEDURE — 36415 COLL VENOUS BLD VENIPUNCTURE: CPT | Performed by: INTERNAL MEDICINE

## 2025-04-01 PROCEDURE — 80048 BASIC METABOLIC PNL TOTAL CA: CPT | Performed by: INTERNAL MEDICINE

## 2025-04-01 PROCEDURE — 82043 UR ALBUMIN QUANTITATIVE: CPT | Performed by: INTERNAL MEDICINE

## 2025-04-01 RX ORDER — AMLODIPINE BESYLATE 5 MG/1
5 TABLET ORAL DAILY
Qty: 90 TABLET | Refills: 1 | Status: SHIPPED | OUTPATIENT
Start: 2025-04-01

## 2025-04-01 RX ORDER — LISINOPRIL 40 MG/1
40 TABLET ORAL DAILY
Qty: 90 TABLET | Refills: 1 | Status: SHIPPED | OUTPATIENT
Start: 2025-04-01

## 2025-04-01 RX ORDER — AMLODIPINE BESYLATE 2.5 MG/1
2.5 TABLET ORAL DAILY
Qty: 90 TABLET | Refills: 0 | Status: SHIPPED | OUTPATIENT
Start: 2025-04-01

## 2025-04-01 RX ORDER — FUROSEMIDE 20 MG/1
20 TABLET ORAL DAILY
Qty: 90 TABLET | Refills: 1 | Status: SHIPPED | OUTPATIENT
Start: 2025-04-01

## 2025-04-01 NOTE — PROGRESS NOTES
"  Assessment & Plan     (E11.9) Type 2 diabetes mellitus without complication, without long-term current use of insulin (H)  (primary encounter diagnosis)  Plan: Last A1c stable, not on any medications at this time, continue to follow ADA diet and regular exercise, check lipid albumin Random Urine Quantitative with Creat Ratio, Basic metabolic panel            (I10) Essential hypertension, benign  Plan:  currently on amlodipine 5 mg daily, started on  amLODIPine (NORVASC) 2.5 MG tablet, to take along with  amlodipine 5 mg for a total of continue on refilled 7.5 mg daily.explained clearly about the medication,insructions and side effects.  Continue and refilled  lisinopril         (ZESTRIL) 40 MG tablet, and amLODIPine (NORVASC) 5 MG tablet as directed.explained clearly about the medication,insructions and side effects. Advised to follow low salt diet and exercise and f/u in 6 mths.    Check  Basic metabolic panel            (E78.2) Mixed hyperlipidemia  Plan: On rosuvastatin 5 mg daily, check lipid panel reflex to direct LDL Fasting, ALT            (G47.00) Insomnia, unspecified type  Plan: Stable on Lunesta. Advised not to drive or operate any machinery while on this med      (N18.31) Stage 3a chronic kidney disease (H)  Plan: Monitor creatinine and avoid nephrotoxins    (R60.9) Edema, unspecified type  Plan: furosemide (LASIX) 20 MG tablet refilled.explained clearly about the medication,insructions and side effects.               MED REC REQUIRED  Post Medication Reconciliation Status:  Discharge medications reconciled and changed, see notes/orders  BMI  Estimated body mass index is 26.99 kg/m  as calculated from the following:    Height as of this encounter: 1.676 m (5' 6\").    Weight as of this encounter: 75.8 kg (167 lb 3.2 oz).       The longitudinal plan of care for the diagnosis(es)/condition(s) as documented were addressed during this visit. Due to the added complexity in care, I will continue to support " Mable in the subsequent management and with ongoing continuity of care.        Cat Akins is a 84 year old, presenting for the following health issues:  Diabetes, Lipids, and Hypertension      4/1/2025     9:13 AM   Additional Questions   Roomed by jerry   Accompanied by self         4/1/2025     9:13 AM   Patient Reported Additional Medications   Patient reports taking the following new medications none     HPI        Diabetes Follow-up    How often are you checking your blood sugar? Not at all  What concerns do you have today about your diabetes? None   Do you have any of these symptoms? (Select all that apply)  No numbness or tingling in feet.  No redness, sores or blisters on feet.  No complaints of excessive thirst.  No reports of blurry vision.  No significant changes to weight.  Have you had a diabetic eye exam in the last 12 months? No      Hyperlipidemia Follow-Up    Are you regularly taking any medication or supplement to lower your cholesterol?   Yes- rosuvasatin  Are you having muscle aches or other side effects that you think could be caused by your cholesterol lowering medication?  Yes- leg cramps      Chronic Kidney Disease Follow-up  Do you take any over the counter pain medicine?: Yes  What over the counter medicine are you taking for your pain?:  Tylenol    How often do you take this medicine?:  Two times daily    Hypertension Follow-up    Do you check your blood pressure regularly outside of the clinic? Yes   Are you following a low salt diet? Yes  Are your blood pressures ever more than 140 on the top number (systolic) OR more   than 90 on the bottom number (diastolic), for example 140/90? No    BP Readings from Last 2 Encounters:   03/15/25 131/66   02/19/25 (!) 148/72     Hemoglobin A1C (%)   Date Value   02/18/2025 5.9 (H)   10/08/2024 6.1 (H)   07/07/2021 6.2 (H)   02/09/2010 6.1 (H)     LDL Cholesterol Calculated (mg/dL)   Date Value   04/16/2024 79   09/26/2023 62   10/07/2020 79  "  07/31/2019 96           Review of Systems  CONSTITUTIONAL: NEGATIVE for fever, chills,    ENT/MOUTH: NEGATIVE for ear, mouth and throat problems  RESP: NEGATIVE for significant cough or SOB  CV: NEGATIVE for chest pain, palpitations or peripheral edema  MUSCULOSKELETAL: NEGATIVE for significant arthralgias or myalgia          Objective    BP (!) 160/70   Pulse 71   Temp (!) 96.4  F (35.8  C) (Tympanic)   Resp 12   Ht 1.676 m (5' 6\")   Wt 75.8 kg (167 lb 3.2 oz)   SpO2 98%   BMI 26.99 kg/m    Body mass index is 26.99 kg/m .  Physical Exam   GENERAL: alert and no distress  EYES: Eyes grossly normal to inspection, PERRL and conjunctivae and sclerae normal  HENT: ear canals and TM's normal, nose and mouth without ulcers or lesions  RESP: lungs clear to auscultation - no rales, rhonchi or wheezes  CV: regular rate and rhythm, normal S1 S2,  MS: no gross musculoskeletal defects noted, no edema  PSYCH: mentation appears normal, affect normal/bright          Signed Electronically by: Adri Malcolm MD  {Email feedback regarding this note to [email or web address]   :[id number]}  "

## 2025-04-01 NOTE — NURSING NOTE
"BP (!) 170/67   Pulse 71   Temp (!) 96.4  F (35.8  C) (Tympanic)   Resp 12   Ht 1.676 m (5' 6\")   Wt 75.8 kg (167 lb 3.2 oz)   SpO2 98%   BMI 26.99 kg/m      "

## 2025-04-02 LAB
ALT SERPL W P-5'-P-CCNC: 13 U/L (ref 0–50)
ANION GAP SERPL CALCULATED.3IONS-SCNC: 12 MMOL/L (ref 7–15)
BUN SERPL-MCNC: 31.8 MG/DL (ref 8–23)
CALCIUM SERPL-MCNC: 9.6 MG/DL (ref 8.8–10.4)
CHLORIDE SERPL-SCNC: 105 MMOL/L (ref 98–107)
CHOLEST SERPL-MCNC: 195 MG/DL
CREAT SERPL-MCNC: 1.33 MG/DL (ref 0.51–0.95)
CREAT UR-MCNC: 126 MG/DL
EGFRCR SERPLBLD CKD-EPI 2021: 39 ML/MIN/1.73M2
FASTING STATUS PATIENT QL REPORTED: YES
FASTING STATUS PATIENT QL REPORTED: YES
GLUCOSE SERPL-MCNC: 105 MG/DL (ref 70–99)
HCO3 SERPL-SCNC: 23 MMOL/L (ref 22–29)
HDLC SERPL-MCNC: 64 MG/DL
LDLC SERPL CALC-MCNC: 105 MG/DL
MICROALBUMIN UR-MCNC: 26.7 MG/L
MICROALBUMIN/CREAT UR: 21.19 MG/G CR (ref 0–25)
NONHDLC SERPL-MCNC: 131 MG/DL
POTASSIUM SERPL-SCNC: 4.9 MMOL/L (ref 3.4–5.3)
SODIUM SERPL-SCNC: 140 MMOL/L (ref 135–145)
TRIGL SERPL-MCNC: 131 MG/DL

## 2025-05-13 ENCOUNTER — OFFICE VISIT (OUTPATIENT)
Dept: INTERNAL MEDICINE | Facility: CLINIC | Age: 85
End: 2025-05-13
Payer: MEDICARE

## 2025-05-13 VITALS
TEMPERATURE: 96.7 F | SYSTOLIC BLOOD PRESSURE: 128 MMHG | RESPIRATION RATE: 12 BRPM | HEIGHT: 66 IN | WEIGHT: 165.8 LBS | BODY MASS INDEX: 26.65 KG/M2 | OXYGEN SATURATION: 96 % | HEART RATE: 82 BPM | DIASTOLIC BLOOD PRESSURE: 70 MMHG

## 2025-05-13 DIAGNOSIS — I10 ESSENTIAL HYPERTENSION, BENIGN: Primary | ICD-10-CM

## 2025-05-13 DIAGNOSIS — E78.2 MIXED HYPERLIPIDEMIA: ICD-10-CM

## 2025-05-13 DIAGNOSIS — G47.00 INSOMNIA, UNSPECIFIED TYPE: ICD-10-CM

## 2025-05-13 PROCEDURE — 99214 OFFICE O/P EST MOD 30 MIN: CPT | Performed by: INTERNAL MEDICINE

## 2025-05-13 PROCEDURE — G2211 COMPLEX E/M VISIT ADD ON: HCPCS | Performed by: INTERNAL MEDICINE

## 2025-05-13 PROCEDURE — 3078F DIAST BP <80 MM HG: CPT | Performed by: INTERNAL MEDICINE

## 2025-05-13 PROCEDURE — 3074F SYST BP LT 130 MM HG: CPT | Performed by: INTERNAL MEDICINE

## 2025-05-13 RX ORDER — AMLODIPINE BESYLATE 2.5 MG/1
2.5 TABLET ORAL DAILY
Qty: 90 TABLET | Refills: 1 | Status: SHIPPED | OUTPATIENT
Start: 2025-05-13

## 2025-05-13 RX ORDER — AMLODIPINE BESYLATE 5 MG/1
5 TABLET ORAL DAILY
Qty: 90 TABLET | Refills: 1 | Status: SHIPPED | OUTPATIENT
Start: 2025-05-13

## 2025-05-13 RX ORDER — ROSUVASTATIN CALCIUM 5 MG/1
5 TABLET, COATED ORAL DAILY
Qty: 90 TABLET | Refills: 1 | Status: SHIPPED | OUTPATIENT
Start: 2025-05-13

## 2025-05-13 RX ORDER — ESZOPICLONE 1 MG/1
TABLET, FILM COATED ORAL
Qty: 18 TABLET | Refills: 0 | Status: SHIPPED | OUTPATIENT
Start: 2025-05-13

## 2025-05-13 NOTE — PROGRESS NOTES
Assessment & Plan     (I10) Essential hypertension, benign  (primary encounter diagnosis)  Comment: Blood pressure has significantly improved   Plan: . Refilled amLODIPine (NORVASC) 2.5 MG tablet, to take along with amLODIPine (NORVASC) 5 MG tablet, daily as directed .explained clearly about the medication,insructions and side effects.  Rpt Creatinine            (E78.2) Mixed hyperlipidemia  Plan: refilled rosuvastatin (CRESTOR) 5 MG tablet daily as directed.explained clearly about the medication,insructions and side effects.  Rpt Lipid panel reflex to direct LDL Fasting, ALT in 06/25, continue to follow low-fat diet and regular exercise.            (G47.00) Insomnia, unspecified type  Plan: eszopiclone (LUNESTA) 1 MG tablet refilled.explained clearly about the medication,insructions and side effects.  Advised not to drive or operate any machinery while on this med            The longitudinal plan of care for the diagnosis(es)/condition(s) as documented were addressed during this visit. Due to the added complexity in care, I will continue to support Mable in the subsequent management and with ongoing continuity of care.          Cat Akins is a 85 year old, presenting for the following health issues:  Recheck Medication (Increased amlodipine)      5/13/2025     8:01 AM   Additional Questions   Roomed by jerry   Accompanied by self         5/13/2025     8:01 AM   Patient Reported Additional Medications   Patient reports taking the following new medications Increased amlodipine     HPI       Hypertension Follow-up    Do you check your blood pressure regularly outside of the clinic? Yes amlodipine was increased to 7.5 mg at last office visit, tolerating well and home BP this morning was 127/70  Are you following a low salt diet? Yes  Are your blood pressures ever more than 140 on the top number (systolic) OR more   than 90 on the bottom number (diastolic), for example 140/90? Yes    BP Readings from Last 2  Encounters:   05/13/25 132/68   04/01/25 (!) 160/70       Hyperlipidemia Follow-Up    Are you regularly taking any medication or supplement to lower your cholesterol?   Yes- Crestor, was increased to 5 mg daily since last month, tolerating well and requesting refills.   Are you having muscle aches or other side effects that you think could be caused by your cholesterol lowering medication?  No      Insomnia follow up; on Lunesta with good symptom control and requesting refills.        Past Medical History:   Diagnosis Date    Arrhythmia     Diabetes (H)     Essential hypertension, benign     Gastroesophageal reflux disease     Mixed hyperlipidemia     Osteoporosis, unspecified     Other seborrheic keratosis     sees DERM    Renal disease     Tobacco use disorder     quit smoking in 09/12     Current Outpatient Medications   Medication Sig Dispense Refill    acetaminophen (TYLENOL) 500 MG tablet Take 500-1,000 mg by mouth every 6 hours as needed for mild pain.      amLODIPine (NORVASC) 2.5 MG tablet Take 1 tablet (2.5 mg) by mouth daily. Take along with 5 mg daily 90 tablet 1    amLODIPine (NORVASC) 5 MG tablet Take 1 tablet (5 mg) by mouth daily. Take along with 2.5 mg daily 90 tablet 1    aspirin 81 MG tablet Take 81 mg by mouth daily      CALCIUM 600 + D 600-200 MG-UNIT OR TABS Take by mouth daily.  0    estradiol (ESTRACE) 0.1 MG/GM vaginal cream Place vaginally three times a week.      eszopiclone (LUNESTA) 1 MG tablet TAKE 1 TABLET BY MOUTH 1 TO 2 TIMES A WEEK AS NEEDED FOR INSOMNIA 18 tablet 0    fish oil-omega-3 fatty acids 1000 MG capsule Take 2 g by mouth daily.      furosemide (LASIX) 20 MG tablet Take 1 tablet (20 mg) by mouth daily. 90 tablet 1    lisinopril (ZESTRIL) 40 MG tablet Take 1 tablet (40 mg) by mouth daily. 90 tablet 1    omeprazole (PRILOSEC) 20 MG DR capsule Take 1 capsule (20 mg) by mouth daily. 90 capsule 2    polyethylene glycol (MIRALAX) 17 GM/Dose powder Take 17 g by mouth daily. 510 g  "0    rosuvastatin (CRESTOR) 5 MG tablet Take 1 tablet (5 mg) by mouth daily. 90 tablet 1    sennosides (SENOKOT) 8.6 MG tablet Take 2 tablets by mouth daily      cephALEXin (KEFLEX) 250 MG capsule Take 1 capsule (250 mg) by mouth at bedtime. (Patient not taking: Reported on 5/13/2025) 7 capsule 0           Review of Systems  CONSTITUTIONAL: NEGATIVE for fever, chills,    RESP: NEGATIVE for significant cough or SOB  CV: NEGATIVE for chest pain, palpitations or peripheral edema  PSYCHIATRIC: NEGATIVE for changes in mood or affect      Objective    /70   Pulse 82   Temp (!) 96.7  F (35.9  C) (Tympanic)   Resp 12   Ht 1.676 m (5' 6\")   Wt 75.2 kg (165 lb 12.8 oz)   SpO2 96%   BMI 26.76 kg/m    Body mass index is 26.76 kg/m .  Physical Exam   GENERAL: alert and no distress  EYES: Eyes grossly normal to inspection, PERRL and conjunctivae and sclerae normal  RESP: lungs clear to auscultation - no rales, rhonchi or wheezes  CV: regular rate and rhythm, normal S1 S2,    MS: no gross musculoskeletal defects noted, no edema  PSYCH: mentation appears normal, affect normal/bright          Signed Electronically by: Adri Malcolm MD    "

## 2025-05-13 NOTE — NURSING NOTE
"/68   Pulse 82   Temp (!) 96.7  F (35.9  C) (Tympanic)   Resp 12   Ht 1.676 m (5' 6\")   Wt 75.2 kg (165 lb 12.8 oz)   SpO2 96%   BMI 26.76 kg/m      "

## 2025-05-25 ENCOUNTER — HEALTH MAINTENANCE LETTER (OUTPATIENT)
Age: 85
End: 2025-05-25

## 2025-06-02 ENCOUNTER — LAB (OUTPATIENT)
Dept: LAB | Facility: CLINIC | Age: 85
End: 2025-06-02
Payer: MEDICARE

## 2025-06-02 DIAGNOSIS — E78.2 MIXED HYPERLIPIDEMIA: ICD-10-CM

## 2025-06-02 DIAGNOSIS — I10 ESSENTIAL HYPERTENSION, BENIGN: ICD-10-CM

## 2025-06-02 PROCEDURE — 36415 COLL VENOUS BLD VENIPUNCTURE: CPT

## 2025-06-02 PROCEDURE — 84460 ALANINE AMINO (ALT) (SGPT): CPT

## 2025-06-02 PROCEDURE — 82565 ASSAY OF CREATININE: CPT

## 2025-06-02 PROCEDURE — 80061 LIPID PANEL: CPT

## 2025-06-03 LAB
ALT SERPL W P-5'-P-CCNC: 19 U/L (ref 0–50)
CHOLEST SERPL-MCNC: 177 MG/DL
CREAT SERPL-MCNC: 1.22 MG/DL (ref 0.51–0.95)
EGFRCR SERPLBLD CKD-EPI 2021: 43 ML/MIN/1.73M2
FASTING STATUS PATIENT QL REPORTED: YES
HDLC SERPL-MCNC: 74 MG/DL
LDLC SERPL CALC-MCNC: 81 MG/DL
NONHDLC SERPL-MCNC: 103 MG/DL
TRIGL SERPL-MCNC: 109 MG/DL

## 2025-06-09 ENCOUNTER — RESULTS FOLLOW-UP (OUTPATIENT)
Dept: INTERNAL MEDICINE | Facility: CLINIC | Age: 85
End: 2025-06-09

## 2025-06-11 DIAGNOSIS — K21.9 GASTROESOPHAGEAL REFLUX DISEASE, UNSPECIFIED WHETHER ESOPHAGITIS PRESENT: ICD-10-CM

## 2025-06-11 RX ORDER — OMEPRAZOLE 20 MG/1
20 CAPSULE, DELAYED RELEASE ORAL DAILY
Qty: 90 CAPSULE | Refills: 1 | Status: SHIPPED | OUTPATIENT
Start: 2025-06-11

## 2025-07-16 DIAGNOSIS — G47.00 INSOMNIA, UNSPECIFIED TYPE: ICD-10-CM

## 2025-07-16 RX ORDER — ESZOPICLONE 1 MG/1
TABLET, FILM COATED ORAL
Qty: 18 TABLET | Refills: 0 | Status: SHIPPED | OUTPATIENT
Start: 2025-07-16

## (undated) DEVICE — BAG CLEAR TRASH 1.3M 39X33" P4040C

## (undated) DEVICE — DRAPE UNDER BUTTOCK 89415

## (undated) DEVICE — SPONGE PACK VAGINAL 2"X9

## (undated) DEVICE — PACK MAJOR LITHOTOMY RIDGES

## (undated) DEVICE — SUTURE VICRYL+ 0 CT-2 18" VCP727H

## (undated) DEVICE — LINEN FULL SHEET 5511

## (undated) DEVICE — SUTURE VICRYL+ 2-0 CT-2 27" UND VCP269H

## (undated) DEVICE — SU VICRYL+ 0 27IN CT-2 VLT VCP334H

## (undated) DEVICE — DRAPE VAGI BAG 18X9" 1072

## (undated) DEVICE — SU DERMABOND ADVANCED .7ML DNX12

## (undated) DEVICE — BLADE CLIPPER DISP 4406

## (undated) DEVICE — DRAPE LEGGINGS 8421

## (undated) DEVICE — SU PDS II 2-0 CT-2 27"  Z333H

## (undated) DEVICE — SUCTION TIP YANKAUER W/O VENT K86

## (undated) DEVICE — SOLUTION IV IRRIGATION 0.9% NACL 1000ML R5200-01

## (undated) DEVICE — CATH FOLEY 16FR 5ML LUBRICATH LATEX 0165L16

## (undated) DEVICE — SPONGE RAY-TEC 4X8" 7318

## (undated) DEVICE — LINEN HALF SHEET 5512

## (undated) DEVICE — BLADE KNIFE SURG 10 371110

## (undated) DEVICE — GLOVE BIOGEL PI MICRO INDICATOR UNDERGLOVE SZ 6.5 48965

## (undated) DEVICE — PAD CHUX UNDERPAD 30X36" P3036C

## (undated) DEVICE — PACKING NUGAUZE 1/2" PLAIN 7632

## (undated) DEVICE — SU VICRYL 2-0 CT-2 27" J333H

## (undated) DEVICE — LINEN TOWEL PACK X5 5464

## (undated) DEVICE — ESU GROUND PAD ADULT W/CORD E7507

## (undated) DEVICE — CATH TRAY FOLEY SURESTEP 16FR DRAIN BAG STATOCK A899916

## (undated) DEVICE — DRSG KERLIX 4 1/2"X4YDS ROLL 6730

## (undated) RX ORDER — LIDOCAINE HYDROCHLORIDE AND EPINEPHRINE 10; 10 MG/ML; UG/ML
INJECTION, SOLUTION INFILTRATION; PERINEURAL
Status: DISPENSED
Start: 2025-03-14

## (undated) RX ORDER — CEFAZOLIN SODIUM/WATER 2 G/20 ML
SYRINGE (ML) INTRAVENOUS
Status: DISPENSED
Start: 2025-03-14

## (undated) RX ORDER — PROPOFOL 10 MG/ML
INJECTION, EMULSION INTRAVENOUS
Status: DISPENSED
Start: 2025-03-14

## (undated) RX ORDER — ACETAMINOPHEN 325 MG/1
TABLET ORAL
Status: DISPENSED
Start: 2025-03-14

## (undated) RX ORDER — LIDOCAINE HYDROCHLORIDE 10 MG/ML
INJECTION, SOLUTION EPIDURAL; INFILTRATION; INTRACAUDAL; PERINEURAL
Status: DISPENSED
Start: 2025-03-14

## (undated) RX ORDER — FENTANYL CITRATE 50 UG/ML
INJECTION, SOLUTION INTRAMUSCULAR; INTRAVENOUS
Status: DISPENSED
Start: 2025-03-14

## (undated) RX ORDER — DEXAMETHASONE SODIUM PHOSPHATE 4 MG/ML
INJECTION, SOLUTION INTRA-ARTICULAR; INTRALESIONAL; INTRAMUSCULAR; INTRAVENOUS; SOFT TISSUE
Status: DISPENSED
Start: 2025-03-14

## (undated) RX ORDER — GINSENG 100 MG
CAPSULE ORAL
Status: DISPENSED
Start: 2025-03-14

## (undated) RX ORDER — EPHEDRINE SULFATE 50 MG/ML
INJECTION, SOLUTION INTRAMUSCULAR; INTRAVENOUS; SUBCUTANEOUS
Status: DISPENSED
Start: 2025-03-14

## (undated) RX ORDER — GLYCOPYRROLATE 0.2 MG/ML
INJECTION, SOLUTION INTRAMUSCULAR; INTRAVENOUS
Status: DISPENSED
Start: 2025-03-14

## (undated) RX ORDER — PHENAZOPYRIDINE HYDROCHLORIDE 200 MG/1
TABLET, FILM COATED ORAL
Status: DISPENSED
Start: 2025-03-14

## (undated) RX ORDER — ONDANSETRON 2 MG/ML
INJECTION INTRAMUSCULAR; INTRAVENOUS
Status: DISPENSED
Start: 2025-03-14

## (undated) RX ORDER — DEXMEDETOMIDINE HYDROCHLORIDE 4 UG/ML
INJECTION, SOLUTION INTRAVENOUS
Status: DISPENSED
Start: 2025-03-14

## (undated) RX ORDER — BUPIVACAINE HYDROCHLORIDE 5 MG/ML
INJECTION, SOLUTION EPIDURAL; INTRACAUDAL; PERINEURAL
Status: DISPENSED
Start: 2025-03-14

## (undated) RX ORDER — FUROSEMIDE 10 MG/ML
INJECTION INTRAMUSCULAR; INTRAVENOUS
Status: DISPENSED
Start: 2025-03-14